# Patient Record
Sex: MALE | Race: WHITE | Employment: OTHER | URBAN - METROPOLITAN AREA
[De-identification: names, ages, dates, MRNs, and addresses within clinical notes are randomized per-mention and may not be internally consistent; named-entity substitution may affect disease eponyms.]

---

## 2017-12-04 ENCOUNTER — OFFICE VISIT (OUTPATIENT)
Dept: FAMILY MEDICINE CLINIC | Age: 62
End: 2017-12-04
Payer: COMMERCIAL

## 2017-12-04 VITALS
HEIGHT: 71 IN | TEMPERATURE: 97 F | WEIGHT: 264 LBS | RESPIRATION RATE: 18 BRPM | SYSTOLIC BLOOD PRESSURE: 134 MMHG | DIASTOLIC BLOOD PRESSURE: 72 MMHG | HEART RATE: 82 BPM | BODY MASS INDEX: 36.96 KG/M2 | OXYGEN SATURATION: 98 %

## 2017-12-04 DIAGNOSIS — Z12.11 SCREENING FOR COLON CANCER: ICD-10-CM

## 2017-12-04 DIAGNOSIS — Z00.00 WELL ADULT EXAM: ICD-10-CM

## 2017-12-04 DIAGNOSIS — Z23 IMMUNIZATION DUE: ICD-10-CM

## 2017-12-04 DIAGNOSIS — Z12.5 SCREENING FOR PROSTATE CANCER: ICD-10-CM

## 2017-12-04 DIAGNOSIS — K42.1 UMBILICAL HERNIA WITH GANGRENE AND OBSTRUCTION: ICD-10-CM

## 2017-12-04 DIAGNOSIS — I87.2 VENOUS REFLUX: ICD-10-CM

## 2017-12-04 DIAGNOSIS — K43.9 VENTRAL HERNIA WITHOUT OBSTRUCTION OR GANGRENE: ICD-10-CM

## 2017-12-04 DIAGNOSIS — E66.9 OBESITY, UNSPECIFIED CLASSIFICATION, UNSPECIFIED OBESITY TYPE, UNSPECIFIED WHETHER SERIOUS COMORBIDITY PRESENT: Primary | ICD-10-CM

## 2017-12-04 PROCEDURE — 90715 TDAP VACCINE 7 YRS/> IM: CPT | Performed by: INTERNAL MEDICINE

## 2017-12-04 PROCEDURE — 90472 IMMUNIZATION ADMIN EACH ADD: CPT | Performed by: INTERNAL MEDICINE

## 2017-12-04 PROCEDURE — 90630 INFLUENZA, QUADV, 18-64 YRS, ID, PF, MICRO INJ, 0.1ML (FLUZONE QUADV, PF): CPT | Performed by: INTERNAL MEDICINE

## 2017-12-04 PROCEDURE — 90471 IMMUNIZATION ADMIN: CPT | Performed by: INTERNAL MEDICINE

## 2017-12-04 PROCEDURE — 99202 OFFICE O/P NEW SF 15 MIN: CPT | Performed by: INTERNAL MEDICINE

## 2017-12-04 ASSESSMENT — ENCOUNTER SYMPTOMS
NAUSEA: 0
DIARRHEA: 0
CONSTIPATION: 0
VOMITING: 0
COLOR CHANGE: 0
ABDOMINAL PAIN: 0
BLOOD IN STOOL: 0
ABDOMINAL DISTENTION: 0
ANAL BLEEDING: 0
RECTAL PAIN: 0

## 2017-12-04 ASSESSMENT — PATIENT HEALTH QUESTIONNAIRE - PHQ9
SUM OF ALL RESPONSES TO PHQ9 QUESTIONS 1 & 2: 0
2. FEELING DOWN, DEPRESSED OR HOPELESS: 0
SUM OF ALL RESPONSES TO PHQ QUESTIONS 1-9: 0
1. LITTLE INTEREST OR PLEASURE IN DOING THINGS: 0

## 2017-12-04 NOTE — PROGRESS NOTES
[address] [facility] [place] [address]  Dept: [phone]  Dept Fax: [phone]    Mounika Garcia is a 57 y.o. male who presents today for his medical conditions/complaints as noted below. Mounika Garcia is c/o of   Chief Complaint   Patient presents with    New Patient    Established New Doctor    Hernia     Right above belly button happened early summer       HPI:     Patient here to establish with provider. Has not been seen in many years. She does not have any chronic medical conditions. Family history of prostate cancer in his father who  about 27 years ago. He states mother  4 years ago from pneumonia complications does not talk to most of his siblings. Is a former smoker but quit approximately 30 years ago. Does not drink except very occasionally. Any recent lab work immunizations or other preventative testing. Also states that he has had a lot of loss of many teeth any sick get into see a dentist.  He denies any real specific symptoms except the main reason he is here is that he noticed a small hernia around his bellybutton this summer and his wife keep snacking him to get it taken care of. Hasn't really changed in size is just somewhat annoying but doesn't really cause any pain no nausea vomiting or constipation no diarrhea and no other GI symptoms. No history of recurrent abdominal surgeries. Has not gotten any bigger or smaller. Worsens with strain. No results found for: LABA1C          ( goal A1C is < 7)   No results found for: LABMICR  No results found for: LDLCHOLESTEROL, LDLCALC    (goal LDL is <100)   No results found for: AST, ALT, BUN  BP Readings from Last 3 Encounters:   17 134/72          (goal 120/80)    History reviewed. No pertinent past medical history. History reviewed. No pertinent surgical history.     Family History   Problem Relation Age of Onset    No Known Problems Mother     Cancer Father     Alcohol Abuse Father     No Known Problems Sister     No Known Problems Sister     No Known Problems Sister        Social History   Substance Use Topics    Smoking status: Never Smoker    Smokeless tobacco: Never Used    Alcohol use No      No current outpatient prescriptions on file. No current facility-administered medications for this visit. No Known Allergies    Health Maintenance   Topic Date Due    DTaP/Tdap/Td vaccine (1 - Tdap) 12/14/1973    Lipid screen  12/14/1994    Diabetes screen  12/14/1994    Colon cancer screen colonoscopy  12/14/2004    Zostavax vaccine  12/14/2014    Flu vaccine (1) 09/01/2017    Hepatitis C screen  12/31/2020 (Originally 1955)    HIV screen  12/30/2021 (Originally 12/14/1969)       Subjective:      Review of Systems   Constitutional: Negative for activity change, appetite change, chills, diaphoresis, fatigue, fever and unexpected weight change. Eyes: Negative for visual disturbance. Cardiovascular: Negative for chest pain, palpitations and leg swelling. Gastrointestinal: Negative for abdominal distention, abdominal pain, anal bleeding, blood in stool, constipation, diarrhea, nausea, rectal pain and vomiting. Musculoskeletal: Negative for arthralgias. Skin: Negative for color change, pallor, rash and wound. Neurological: Negative for headaches. Psychiatric/Behavioral: Negative for sleep disturbance. Objective:     Physical Exam   Constitutional: He is oriented to person, place, and time. He appears well-developed and well-nourished. He is active. Non-toxic appearance. He does not have a sickly appearance. He does not appear ill. No distress. Obese    HENT:   Right Ear: External ear normal.   Left Ear: External ear normal.   Nose: Nose normal.   Mouth/Throat: Uvula is midline, oropharynx is clear and moist and mucous membranes are normal. He does not have dentures. Abnormal dentition.  Dental exam     5. Screening for prostate cancer  Psa screening   6. Ventral hernia without obstruction or gangrene     7. Venous reflux     8. Immunization due  Adra Conrad, 18-64 YRS, ID, PF, MICRO INJ, 0.1ML (FLUZONE QUADV, PF)    Tdap (age 10y-63y) IM (Adacel)             Plan:      Return in about 1 year (around 12/4/2018) for annual exam.    Orders Placed This Encounter   Procedures    INFLUENZA, Marzena Sinclair, 18-64 YRS, ID, PF, MICRO INJ, 0.1ML (FLUZONE QUADV, PF)    Tdap (age 10y-63y) IM (Adacel)    CBC Auto Differential     Standing Status:   Future     Standing Expiration Date:   12/4/2018    Comprehensive Metabolic Panel     Standing Status:   Future     Standing Expiration Date:   12/4/2018    Psa screening     Standing Status:   Future     Standing Expiration Date:   12/4/2018    Lipid Panel     Standing Status:   Future     Standing Expiration Date:   12/4/2018     Order Specific Question:   Is Patient Fasting?/# of Hours     Answer:   yes    Hemoglobin A1C     Standing Status:   Future     Standing Expiration Date:   12/4/2018   [facility] General Surgery     Referral Priority:   Routine     Referral Type:   Consult for Advice and Opinion     Referral Reason:   Specialty Services Required     Requested Specialty:   General Surgery     Number of Visits Requested:   1     No orders of the defined types were placed in this encounter. Given tdap booster and flu shot today as due. Reviewed these vaccines with patient. He also wants to do colonoscopy versus FIT TEST SO PUT IN REFERRAL to general surgery in the hopes they can do both the cscope for screening and surgery for hernia( but clearly not at the same time) . Also given lab slip for routine screening labs and we will call with results as soon as we get them an dif anything abnormal may need to make a sooner follow up. Call with any questions or concerns of if anything acute comes up.       Patient given educational materials - see patient instructions. Discussed use, benefit, and side effects of prescribed medications. All patient questions answered. Pt voiced understanding. Reviewed health maintenance. Instructed to continue current medications, diet and exercise. Patient agreed with treatment plan. Follow up as directed.      Electronically signed by Yessy Hopkins DO on 12/4/2017 at 11:14 AM

## 2017-12-05 ENCOUNTER — HOSPITAL ENCOUNTER (OUTPATIENT)
Age: 62
Discharge: HOME | End: 2017-12-05
Payer: COMMERCIAL

## 2017-12-05 DIAGNOSIS — Z12.5 SCREENING FOR PROSTATE CANCER: ICD-10-CM

## 2017-12-05 DIAGNOSIS — E66.9 OBESITY, UNSPECIFIED CLASSIFICATION, UNSPECIFIED OBESITY TYPE, UNSPECIFIED WHETHER SERIOUS COMORBIDITY PRESENT: ICD-10-CM

## 2017-12-05 DIAGNOSIS — K42.1 UMBILICAL HERNIA WITH GANGRENE AND OBSTRUCTION: ICD-10-CM

## 2017-12-05 LAB
ABSOLUTE EOS #: 0.1 K/UL (ref 0–0.4)
ABSOLUTE IMMATURE GRANULOCYTE: ABNORMAL K/UL (ref 0–0.3)
ABSOLUTE LYMPH #: 1.4 K/UL (ref 1–4.8)
ABSOLUTE MONO #: 0.5 K/UL (ref 0.2–0.8)
ALBUMIN SERPL-MCNC: 3.9 G/DL (ref 3.5–5.2)
ALBUMIN/GLOBULIN RATIO: ABNORMAL (ref 1–2.5)
ALP BLD-CCNC: 64 U/L (ref 40–129)
ALT SERPL-CCNC: 27 U/L (ref 5–41)
ANION GAP SERPL CALCULATED.3IONS-SCNC: 10 MMOL/L (ref 9–17)
AST SERPL-CCNC: 19 U/L
BASOPHILS # BLD: 1 % (ref 0–2)
BASOPHILS ABSOLUTE: 0 K/UL (ref 0–0.2)
BILIRUB SERPL-MCNC: 0.67 MG/DL (ref 0.3–1.2)
BUN BLDV-MCNC: 16 MG/DL (ref 8–23)
BUN/CREAT BLD: 13 (ref 9–20)
CALCIUM SERPL-MCNC: 8.8 MG/DL (ref 8.6–10.4)
CHLORIDE BLD-SCNC: 102 MMOL/L (ref 98–107)
CHOLESTEROL/HDL RATIO: 5.1
CHOLESTEROL: 189 MG/DL
CO2: 28 MMOL/L (ref 20–31)
CREAT SERPL-MCNC: 1.22 MG/DL (ref 0.7–1.2)
DIFFERENTIAL TYPE: ABNORMAL
EOSINOPHILS RELATIVE PERCENT: 2 % (ref 1–4)
ESTIMATED AVERAGE GLUCOSE: 120 MG/DL
GFR AFRICAN AMERICAN: >60 ML/MIN
GFR NON-AFRICAN AMERICAN: >60 ML/MIN
GFR SERPL CREATININE-BSD FRML MDRD: ABNORMAL ML/MIN/{1.73_M2}
GFR SERPL CREATININE-BSD FRML MDRD: ABNORMAL ML/MIN/{1.73_M2}
GLUCOSE BLD-MCNC: 112 MG/DL (ref 70–99)
HBA1C MFR BLD: 5.8 % (ref 4–6)
HCT VFR BLD CALC: 47.2 % (ref 41–53)
HDLC SERPL-MCNC: 37 MG/DL
HEMOGLOBIN: 15.5 G/DL (ref 13.5–17.5)
IMMATURE GRANULOCYTES: ABNORMAL %
LDL CHOLESTEROL: 123 MG/DL (ref 0–130)
LYMPHOCYTES # BLD: 23 % (ref 24–44)
MCH RBC QN AUTO: 29.1 PG (ref 26–34)
MCHC RBC AUTO-ENTMCNC: 32.8 G/DL (ref 31–37)
MCV RBC AUTO: 88.9 FL (ref 80–100)
MONOCYTES # BLD: 8 % (ref 1–7)
PDW BLD-RTO: 13.6 % (ref 11.5–14.5)
PLATELET # BLD: 245 K/UL (ref 130–400)
PLATELET ESTIMATE: ABNORMAL
PMV BLD AUTO: 8.3 FL (ref 6–12)
POTASSIUM SERPL-SCNC: 4.4 MMOL/L (ref 3.7–5.3)
PROSTATE SPECIFIC ANTIGEN: 3.66 UG/L
RBC # BLD: 5.31 M/UL (ref 4.5–5.9)
RBC # BLD: ABNORMAL 10*6/UL
SEG NEUTROPHILS: 66 % (ref 36–66)
SEGMENTED NEUTROPHILS ABSOLUTE COUNT: 4.1 K/UL (ref 1.8–7.7)
SODIUM BLD-SCNC: 140 MMOL/L (ref 135–144)
TOTAL PROTEIN: 7.8 G/DL (ref 6.4–8.3)
TRIGL SERPL-MCNC: 146 MG/DL
VLDLC SERPL CALC-MCNC: ABNORMAL MG/DL (ref 1–30)
WBC # BLD: 6.2 K/UL (ref 3.5–11)
WBC # BLD: ABNORMAL 10*3/UL

## 2017-12-05 PROCEDURE — G0103 PSA SCREENING: HCPCS

## 2017-12-05 PROCEDURE — 36415 COLL VENOUS BLD VENIPUNCTURE: CPT

## 2017-12-05 PROCEDURE — 83036 HEMOGLOBIN GLYCOSYLATED A1C: CPT

## 2017-12-05 PROCEDURE — 80053 COMPREHEN METABOLIC PANEL: CPT

## 2017-12-05 PROCEDURE — 80061 LIPID PANEL: CPT

## 2017-12-05 PROCEDURE — 85025 COMPLETE CBC W/AUTO DIFF WBC: CPT

## 2017-12-21 ENCOUNTER — OFFICE VISIT (OUTPATIENT)
Dept: SURGERY | Age: 62
End: 2017-12-21
Payer: COMMERCIAL

## 2017-12-21 VITALS
SYSTOLIC BLOOD PRESSURE: 125 MMHG | DIASTOLIC BLOOD PRESSURE: 85 MMHG | HEART RATE: 87 BPM | BODY MASS INDEX: 35.38 KG/M2 | WEIGHT: 261.2 LBS | HEIGHT: 72 IN

## 2017-12-21 DIAGNOSIS — K42.9 UMBILICAL HERNIA WITHOUT OBSTRUCTION OR GANGRENE: Primary | ICD-10-CM

## 2017-12-21 DIAGNOSIS — E66.01 MORBID (SEVERE) OBESITY DUE TO EXCESS CALORIES (HCC): ICD-10-CM

## 2017-12-21 PROCEDURE — 99203 OFFICE O/P NEW LOW 30 MIN: CPT | Performed by: SURGERY

## 2017-12-21 NOTE — PROGRESS NOTES
History & Physical      Chief Complaint   Patient presents with    Hernia     NP umbillical hernia referred by Dr. Naz Patiño Maintenance     pt up to date       HPI  Mr. Leif Hooks is a 60 y.o. y.o. male seen for umbilical hernia with intermittent pain. He denies nausea or vomiting. He is morbid obesity. REVIEW OF SYSTEMS:    CONSTITUTIONAL:  positive for  Morbid obesity  EYES:  negative  HEENT:  negative  RESPIRATORY:  negative  CARDIOVASCULAR:  negative  GASTROINTESTINAL:  positive for abdominal pain and hernia  GENITOURINARY:  negative  INTEGUMENT/BREAST:  negative  HEMATOLOGIC/LYMPHATIC:  negative  ALLERGIC/IMMUNOLOGIC:  negative  ENDOCRINE:  negative  MUSCULOSKELETAL:  negative  NEUROLOGICAL:  negative  BEHAVIOR/PSYCH:  negative      History reviewed. No pertinent past medical history. History reviewed. No pertinent surgical history. Family History   Problem Relation Age of Onset    No Known Problems Mother     Cancer Father     Alcohol Abuse Father     No Known Problems Sister     No Known Problems Sister     No Known Problems Sister      Social History     Social History    Marital status:      Spouse name: N/A    Number of children: N/A    Years of education: N/A     Occupational History    Not on file. Social History Main Topics    Smoking status: Never Smoker    Smokeless tobacco: Never Used    Alcohol use No    Drug use: No    Sexual activity: Yes     Partners: Female     Other Topics Concern    Not on file     Social History Narrative    No narrative on file     No current outpatient prescriptions on file prior to visit. No current facility-administered medications on file prior to visit. Physical Exam:    Vitals: /85 (Site: Right Arm, Position: Sitting, Cuff Size: Medium Adult)   Pulse 87   Ht 6' (1.829 m)   Wt 261 lb 3.2 oz (118.5 kg)   BMI 35.43 kg/m²   Last 3 weights:   Wt Readings from Last 3 Encounters:   12/21/17 found for: MG  Phosphorus: No results found for: PHOS  ABG: No results found for: PHART  Amylase: No results found for: AMYLASE  Lipase: No results found for: LIPASE      Dory Villalobos was seen today for hernia and health maintenance. Diagnoses and all orders for this visit:    Umbilical hernia without obstruction or gangrene    Morbid (severe) obesity due to excess calories ([address]        Plan  · Open umbilical hernia repair under MAC. · Patient was informed the risk of the surgery include, but not limited to infection, bleeding, recurrence. Patient expressed understanding, agree to proceed.      Tomy Monson M.D.

## 2017-12-27 ENCOUNTER — TELEPHONE (OUTPATIENT)
Dept: SURGERY | Age: 62
End: 2017-12-27

## 2017-12-27 NOTE — TELEPHONE ENCOUNTER
Patient is scheduled for surgery on 1/4/18 at 7:30am.  Patient confirmed and verbalized understanding.

## 2018-01-03 ENCOUNTER — ANESTHESIA EVENT (OUTPATIENT)
Dept: OPERATING ROOM | Age: 63
End: 2018-01-03
Payer: COMMERCIAL

## 2018-01-04 ENCOUNTER — ANESTHESIA (OUTPATIENT)
Dept: OPERATING ROOM | Age: 63
End: 2018-01-04
Payer: COMMERCIAL

## 2018-01-04 ENCOUNTER — HOSPITAL ENCOUNTER (OUTPATIENT)
Age: 63
Setting detail: OUTPATIENT SURGERY
Discharge: HOME | End: 2018-01-04
Attending: SURGERY | Admitting: SURGERY
Payer: COMMERCIAL

## 2018-01-04 VITALS — DIASTOLIC BLOOD PRESSURE: 54 MMHG | SYSTOLIC BLOOD PRESSURE: 93 MMHG | OXYGEN SATURATION: 95 % | TEMPERATURE: 96.9 F

## 2018-01-04 VITALS
RESPIRATION RATE: 16 BRPM | OXYGEN SATURATION: 97 % | TEMPERATURE: 97.5 F | SYSTOLIC BLOOD PRESSURE: 126 MMHG | HEIGHT: 71 IN | HEART RATE: 76 BPM | WEIGHT: 260.8 LBS | DIASTOLIC BLOOD PRESSURE: 76 MMHG | BODY MASS INDEX: 36.51 KG/M2

## 2018-01-04 DIAGNOSIS — K42.9 UMBILICAL HERNIA WITHOUT OBSTRUCTION OR GANGRENE: Primary | ICD-10-CM

## 2018-01-04 LAB
EKG ATRIAL RATE: 71 BPM
EKG P AXIS: 58 DEGREES
EKG P-R INTERVAL: 172 MS
EKG Q-T INTERVAL: 396 MS
EKG QRS DURATION: 82 MS
EKG QTC CALCULATION (BAZETT): 430 MS
EKG R AXIS: 26 DEGREES
EKG T AXIS: 47 DEGREES
EKG VENTRICULAR RATE: 71 BPM

## 2018-01-04 PROCEDURE — 2500000003 HC RX 250 WO HCPCS: Performed by: NURSE ANESTHETIST, CERTIFIED REGISTERED

## 2018-01-04 PROCEDURE — 2580000003 HC RX 258: Performed by: ANESTHESIOLOGY

## 2018-01-04 PROCEDURE — 7100000001 HC PACU RECOVERY - ADDTL 15 MIN: Performed by: SURGERY

## 2018-01-04 PROCEDURE — 6370000000 HC RX 637 (ALT 250 FOR IP): Performed by: SURGERY

## 2018-01-04 PROCEDURE — 3700000000 HC ANESTHESIA ATTENDED CARE: Performed by: SURGERY

## 2018-01-04 PROCEDURE — 3600000003 HC SURGERY LEVEL 3 BASE: Performed by: SURGERY

## 2018-01-04 PROCEDURE — 2500000003 HC RX 250 WO HCPCS: Performed by: SURGERY

## 2018-01-04 PROCEDURE — 7100000010 HC PHASE II RECOVERY - FIRST 15 MIN: Performed by: SURGERY

## 2018-01-04 PROCEDURE — 6360000002 HC RX W HCPCS: Performed by: NURSE ANESTHETIST, CERTIFIED REGISTERED

## 2018-01-04 PROCEDURE — 7100000000 HC PACU RECOVERY - FIRST 15 MIN: Performed by: SURGERY

## 2018-01-04 PROCEDURE — 3600000013 HC SURGERY LEVEL 3 ADDTL 15MIN: Performed by: SURGERY

## 2018-01-04 PROCEDURE — 3700000001 HC ADD 15 MINUTES (ANESTHESIA): Performed by: SURGERY

## 2018-01-04 PROCEDURE — A6258 TRANSPARENT FILM >16<=48 IN: HCPCS | Performed by: SURGERY

## 2018-01-04 PROCEDURE — 2580000003 HC RX 258: Performed by: SURGERY

## 2018-01-04 PROCEDURE — 6360000002 HC RX W HCPCS

## 2018-01-04 PROCEDURE — 93005 ELECTROCARDIOGRAM TRACING: CPT

## 2018-01-04 PROCEDURE — L0450 TLSO FLEX TRUNK/THOR PRE OTS: HCPCS | Performed by: SURGERY

## 2018-01-04 RX ORDER — SODIUM CHLORIDE 0.9 % (FLUSH) 0.9 %
10 SYRINGE (ML) INJECTION EVERY 12 HOURS SCHEDULED
Status: DISCONTINUED | OUTPATIENT
Start: 2018-01-04 | End: 2018-01-04 | Stop reason: HOSPADM

## 2018-01-04 RX ORDER — SODIUM CHLORIDE, SODIUM LACTATE, POTASSIUM CHLORIDE, CALCIUM CHLORIDE 600; 310; 30; 20 MG/100ML; MG/100ML; MG/100ML; MG/100ML
INJECTION, SOLUTION INTRAVENOUS CONTINUOUS
Status: DISCONTINUED | OUTPATIENT
Start: 2018-01-04 | End: 2018-01-04 | Stop reason: HOSPADM

## 2018-01-04 RX ORDER — SODIUM CHLORIDE 0.9 % (FLUSH) 0.9 %
10 SYRINGE (ML) INJECTION PRN
Status: DISCONTINUED | OUTPATIENT
Start: 2018-01-04 | End: 2018-01-04 | Stop reason: HOSPADM

## 2018-01-04 RX ORDER — MIDAZOLAM HYDROCHLORIDE 1 MG/ML
2 INJECTION INTRAMUSCULAR; INTRAVENOUS
Status: DISCONTINUED | OUTPATIENT
Start: 2018-01-04 | End: 2018-01-04 | Stop reason: HOSPADM

## 2018-01-04 RX ORDER — PROPOFOL 10 MG/ML
INJECTION, EMULSION INTRAVENOUS PRN
Status: DISCONTINUED | OUTPATIENT
Start: 2018-01-04 | End: 2018-01-04 | Stop reason: SDUPTHER

## 2018-01-04 RX ORDER — BUPIVACAINE HYDROCHLORIDE 5 MG/ML
INJECTION, SOLUTION EPIDURAL; INTRACAUDAL PRN
Status: DISCONTINUED | OUTPATIENT
Start: 2018-01-04 | End: 2018-01-04 | Stop reason: HOSPADM

## 2018-01-04 RX ORDER — GINSENG 100 MG
CAPSULE ORAL PRN
Status: DISCONTINUED | OUTPATIENT
Start: 2018-01-04 | End: 2018-01-04 | Stop reason: HOSPADM

## 2018-01-04 RX ORDER — MAGNESIUM HYDROXIDE 1200 MG/15ML
LIQUID ORAL CONTINUOUS PRN
Status: DISCONTINUED | OUTPATIENT
Start: 2018-01-04 | End: 2018-01-04 | Stop reason: HOSPADM

## 2018-01-04 RX ORDER — ONDANSETRON 2 MG/ML
4 INJECTION INTRAMUSCULAR; INTRAVENOUS
Status: DISCONTINUED | OUTPATIENT
Start: 2018-01-04 | End: 2018-01-04 | Stop reason: HOSPADM

## 2018-01-04 RX ORDER — DEXAMETHASONE SODIUM PHOSPHATE 10 MG/ML
INJECTION INTRAMUSCULAR; INTRAVENOUS PRN
Status: DISCONTINUED | OUTPATIENT
Start: 2018-01-04 | End: 2018-01-04 | Stop reason: SDUPTHER

## 2018-01-04 RX ORDER — ONDANSETRON 2 MG/ML
INJECTION INTRAMUSCULAR; INTRAVENOUS PRN
Status: DISCONTINUED | OUTPATIENT
Start: 2018-01-04 | End: 2018-01-04 | Stop reason: SDUPTHER

## 2018-01-04 RX ORDER — LIDOCAINE HYDROCHLORIDE 10 MG/ML
1 INJECTION, SOLUTION EPIDURAL; INFILTRATION; INTRACAUDAL; PERINEURAL
Status: DISCONTINUED | OUTPATIENT
Start: 2018-01-04 | End: 2018-01-04 | Stop reason: HOSPADM

## 2018-01-04 RX ORDER — FENTANYL CITRATE 50 UG/ML
50 INJECTION, SOLUTION INTRAMUSCULAR; INTRAVENOUS EVERY 5 MIN PRN
Status: DISCONTINUED | OUTPATIENT
Start: 2018-01-04 | End: 2018-01-04 | Stop reason: HOSPADM

## 2018-01-04 RX ORDER — OXYCODONE HYDROCHLORIDE AND ACETAMINOPHEN 5; 325 MG/1; MG/1
1 TABLET ORAL EVERY 6 HOURS PRN
Qty: 28 TABLET | Refills: 0 | Status: SHIPPED | OUTPATIENT
Start: 2018-01-04 | End: 2018-01-11

## 2018-01-04 RX ORDER — LIDOCAINE HYDROCHLORIDE 10 MG/ML
INJECTION, SOLUTION EPIDURAL; INFILTRATION; INTRACAUDAL; PERINEURAL PRN
Status: DISCONTINUED | OUTPATIENT
Start: 2018-01-04 | End: 2018-01-04 | Stop reason: SDUPTHER

## 2018-01-04 RX ORDER — ROCURONIUM BROMIDE 10 MG/ML
INJECTION, SOLUTION INTRAVENOUS PRN
Status: DISCONTINUED | OUTPATIENT
Start: 2018-01-04 | End: 2018-01-04 | Stop reason: SDUPTHER

## 2018-01-04 RX ORDER — FENTANYL CITRATE 50 UG/ML
INJECTION, SOLUTION INTRAMUSCULAR; INTRAVENOUS PRN
Status: DISCONTINUED | OUTPATIENT
Start: 2018-01-04 | End: 2018-01-04 | Stop reason: SDUPTHER

## 2018-01-04 RX ORDER — LABETALOL HYDROCHLORIDE 5 MG/ML
5 INJECTION, SOLUTION INTRAVENOUS EVERY 10 MIN PRN
Status: DISCONTINUED | OUTPATIENT
Start: 2018-01-04 | End: 2018-01-04 | Stop reason: HOSPADM

## 2018-01-04 RX ORDER — GLYCOPYRROLATE 0.2 MG/ML
INJECTION INTRAMUSCULAR; INTRAVENOUS PRN
Status: DISCONTINUED | OUTPATIENT
Start: 2018-01-04 | End: 2018-01-04 | Stop reason: SDUPTHER

## 2018-01-04 RX ADMIN — GLYCOPYRROLATE 0.4 MG: 0.2 INJECTION INTRAMUSCULAR; INTRAVENOUS at 07:59

## 2018-01-04 RX ADMIN — PROPOFOL 200 MG: 10 INJECTION, EMULSION INTRAVENOUS at 07:19

## 2018-01-04 RX ADMIN — SODIUM CHLORIDE, POTASSIUM CHLORIDE, SODIUM LACTATE AND CALCIUM CHLORIDE: 600; 310; 30; 20 INJECTION, SOLUTION INTRAVENOUS at 07:04

## 2018-01-04 RX ADMIN — ONDANSETRON 4 MG: 2 INJECTION INTRAMUSCULAR; INTRAVENOUS at 08:00

## 2018-01-04 RX ADMIN — DEXAMETHASONE SODIUM PHOSPHATE 10 MG: 10 INJECTION INTRAMUSCULAR; INTRAVENOUS at 07:30

## 2018-01-04 RX ADMIN — ROCURONIUM BROMIDE 30 MG: 10 INJECTION INTRAVENOUS at 07:19

## 2018-01-04 RX ADMIN — NEOSTIGMINE METHYLSULFATE 2 MG: 1 INJECTION, SOLUTION INTRAMUSCULAR; INTRAVENOUS; SUBCUTANEOUS at 07:59

## 2018-01-04 RX ADMIN — LIDOCAINE HYDROCHLORIDE 50 MG: 10 INJECTION, SOLUTION EPIDURAL; INFILTRATION; INTRACAUDAL; PERINEURAL at 07:19

## 2018-01-04 RX ADMIN — FENTANYL CITRATE 50 MCG: 50 INJECTION INTRAMUSCULAR; INTRAVENOUS at 07:19

## 2018-01-04 RX ADMIN — LIDOCAINE HYDROCHLORIDE 50 MG: 10 INJECTION, SOLUTION EPIDURAL; INFILTRATION; INTRACAUDAL; PERINEURAL at 08:20

## 2018-01-04 ASSESSMENT — PULMONARY FUNCTION TESTS
PIF_VALUE: 3
PIF_VALUE: 19
PIF_VALUE: 3
PIF_VALUE: 19
PIF_VALUE: 20
PIF_VALUE: 19
PIF_VALUE: 21
PIF_VALUE: 3
PIF_VALUE: 3
PIF_VALUE: 0
PIF_VALUE: 2
PIF_VALUE: 14
PIF_VALUE: 16
PIF_VALUE: 3
PIF_VALUE: 18
PIF_VALUE: 1
PIF_VALUE: 20
PIF_VALUE: 3
PIF_VALUE: 3
PIF_VALUE: 19
PIF_VALUE: 14
PIF_VALUE: 18
PIF_VALUE: 3
PIF_VALUE: 21
PIF_VALUE: 19
PIF_VALUE: 0
PIF_VALUE: 20
PIF_VALUE: 0
PIF_VALUE: 3
PIF_VALUE: 0
PIF_VALUE: 11
PIF_VALUE: 20
PIF_VALUE: 2
PIF_VALUE: 3
PIF_VALUE: 18
PIF_VALUE: 3
PIF_VALUE: 2
PIF_VALUE: 17
PIF_VALUE: 21
PIF_VALUE: 3
PIF_VALUE: 20
PIF_VALUE: 19
PIF_VALUE: 20
PIF_VALUE: 0
PIF_VALUE: 1
PIF_VALUE: 3
PIF_VALUE: 18
PIF_VALUE: 1
PIF_VALUE: 19
PIF_VALUE: 4
PIF_VALUE: 3
PIF_VALUE: 0
PIF_VALUE: 18
PIF_VALUE: 20
PIF_VALUE: 20
PIF_VALUE: 19
PIF_VALUE: 16
PIF_VALUE: 13
PIF_VALUE: 3
PIF_VALUE: 21
PIF_VALUE: 18
PIF_VALUE: 3
PIF_VALUE: 20
PIF_VALUE: 3
PIF_VALUE: 3
PIF_VALUE: 19
PIF_VALUE: 3
PIF_VALUE: 19
PIF_VALUE: 19
PIF_VALUE: 3
PIF_VALUE: 2

## 2018-01-04 ASSESSMENT — PAIN DESCRIPTION - PAIN TYPE
TYPE: SURGICAL PAIN
TYPE: SURGICAL PAIN

## 2018-01-04 ASSESSMENT — PAIN SCALES - GENERAL
PAINLEVEL_OUTOF10: 2

## 2018-01-04 ASSESSMENT — PAIN DESCRIPTION - LOCATION
LOCATION: ABDOMEN
LOCATION: ABDOMEN

## 2018-01-04 ASSESSMENT — PAIN - FUNCTIONAL ASSESSMENT: PAIN_FUNCTIONAL_ASSESSMENT: 0-10

## 2018-01-04 NOTE — H&P
H&P from 12/21/2017 was reviewed. There is no change. Electronically signed by Vaishali Campbell MD on 1/4/2018 at 7:14 AM               Chief Complaint   Patient presents with    Hernia       NP umbillical hernia referred by Dr. Rhonda Sawyer pt up to date         HPI  Mr. Helen Obrien is a 60 y.o. y.o. male seen for umbilical hernia with intermittent pain. He denies nausea or vomiting. He is morbid obesity.      REVIEW OF SYSTEMS:     CONSTITUTIONAL:  positive for  Morbid obesity  EYES:  negative  HEENT:  negative  RESPIRATORY:  negative  CARDIOVASCULAR:  negative  GASTROINTESTINAL:  positive for abdominal pain and hernia  GENITOURINARY:  negative  INTEGUMENT/BREAST:  negative  HEMATOLOGIC/LYMPHATIC:  negative  ALLERGIC/IMMUNOLOGIC:  negative  ENDOCRINE:  negative  MUSCULOSKELETAL:  negative  NEUROLOGICAL:  negative  BEHAVIOR/PSYCH:  negative        Past Medical History   History reviewed. No pertinent past medical history. Past Surgical History   History reviewed. No pertinent surgical history.      Family History         Family History   Problem Relation Age of Onset    No Known Problems Mother      Cancer Father      Alcohol Abuse Father      No Known Problems Sister      No Known Problems Sister      No Known Problems Sister           Social History   Social History            Social History    Marital status:        Spouse name: N/A    Number of children: N/A    Years of education: N/A          Occupational History    Not on file.            Social History Main Topics    Smoking status: Never Smoker    Smokeless tobacco: Never Used    Alcohol use No    Drug use: No    Sexual activity: Yes       Partners: Female           Other Topics Concern    Not on file          Social History Narrative    No narrative on file         No current outpatient prescriptions on file prior to visit.      No current facility-administered medications on file prior to

## 2018-01-04 NOTE — BRIEF OP NOTE
Brief Postoperative Note  ______________________________________________________________    Patient: Scott Failing  YOB: 1955  MRN: [id number]  Date of Procedure: 1/4/2018    Pre-Op Diagnosis: UMBILICAL HERNIA WITHOUT OBSTRUCTION     Post-Op Diagnosis: Same       Procedure(s):  OPEN UMBILICAL HERNIA REPAIR, PRIMARY REPAIR    Anesthesia: General    Surgeon(s):  Yaw Gaston MD     Assistant:   Frank Nevarez DO, PGY-2  Alicia Jensen, MS3    Staff:  Patricia Scrub: Davis Pabon  Scrub Person First: Larance Loss     Estimated Blood Loss: 5cc    Complications: None    Findings: Fat containing umbilical hernia sac, without bowel. Hernia defect 1cm. Reduced and closed fascia primarily.     Frank Nevarez DO  Date: 1/4/2018  Time: 8:36 AM

## 2018-01-05 NOTE — OP NOTE
[address] [facility] [address]                                 OPERATIVE REPORT    PATIENT NAME: Blanca Weber                  :        1955  MED REC NO:   [id number]                             ROOM:  ACCOUNT NO:   [de-identified]                           ADMIT DATE: 2018  PROVIDER:     Maria Del Carmen Zeng    DATE OF PROCEDURE:  2018    PREOPERATIVE DIAGNOSIS:  Umbilical hernia without obstruction. POSTOPERATIVE DIAGNOSIS:  Umbilical hernia without obstruction. PROCEDURE:  Open umbilical hernia repair with primary fascial closure. SURGEON:  Lito Faulkner MD    ASSISTANT:  Maria Del Carmen Zeng DO, PGY-2    ANESTHESIA:  General.    ESTIMATED BLOOD LOSS:  5 mL. FINDINGS:  There was an umbilical hernia without obstruction; there was a  fat-containing hernia sac without bowel; reduced successfully and closed  primarily. SPECIMENS:  None. DISPOSITION:  To the recovery room in stable condition. INDICATIONS:  This is a very pleasant 58-year-old male who presented with  periumbilical discomfort with an associated bulge. The risks and benefits  of umbilical hernia repair were discussed with the patient including  bleeding, infection, bowel or bladder injuries as well as intra-abdominal  adhesions, recurrence, chronic pain, wound healing problems. After all the  risks, alternatives, and benefits were discussed with the patient, informed  consent was obtained with an RN witness. OPERATIVE PROCEDURE:  After verbal and written consent, the patient was  brought to the operating room, placed in the supine position. All the  appropriate monitoring, general endotracheal anesthesia was administered at  this time.   Antibiotics were provided in accordance with SCIP protocol in  the form of 2 gm of Ancef, and time-out was performed and the staff in the  room confirmed the patient and the procedure to be performed. Procedure  was then begun with sterile prep and drape to the abdomen. At this time,  local anesthesia was infiltrated to the skin and subcutaneous tissue as a  field block. A #15 blade was used to make a curvilinear infraumbilical  incision, and dissection was then carried down to the subcutaneous tissue  using Bovie electrocautery and Metzenbaums. After a short way through  subcutaneous tissue, the hernia sac was identified and dissected free from  its surrounding structures and fascia. The hernia appeared to be just a  fat-containing hernia without bowel. There was no sign of any sort of  obstruction. After the fascial edges were identified and freed up and  dissected, the hernia was then reduced. Fascia defect is 1.5 cm. At the time of reduction of the  hernia, a #2-0 Prolene suture was then used to close the fascia primarily  in an interrupted fashion. After 3 interrupted Prolene sutures were used  to close the fascia, at this point the umbilicus was anchored using a 4-0  Vicryl suture. At this time, the deep dermal layers were closed in  interrupted inverted dermal suture fashion using 4-0 vicryl, approximating the dermis and  epidermis. Bacitracin was then applied to the incision, and a cotton ball  was placed over it to negative pressure. Tegaderm surgical dressing was  placed and to be kept in place until he has seen Dr. Sue Sheehan in the office in  7 days. All wound care instructions were provided to the patient. All  sponge and instrument counts were correct at the end of the procedure. The  patient was then taken to the PACU in stable condition. Heladio Rasmussen    D: 01/04/2018 9:37:16       T: 01/04/2018 13:28:31     [email or web address]  Job#: [id number]     Doc#: [id number]    cc: Preet Smiley DO      Attending Physician Statement  I have discussed the case, including pertinent history and exam findings with Dr. Shikha Bell, surgical resident and have personally seen the patient.  I agree

## 2018-01-11 ENCOUNTER — OFFICE VISIT (OUTPATIENT)
Dept: SURGERY | Age: 63
End: 2018-01-11
Payer: COMMERCIAL

## 2018-01-11 VITALS
WEIGHT: 262 LBS | SYSTOLIC BLOOD PRESSURE: 137 MMHG | DIASTOLIC BLOOD PRESSURE: 88 MMHG | HEART RATE: 104 BPM | HEIGHT: 72 IN | BODY MASS INDEX: 35.49 KG/M2 | TEMPERATURE: 98.4 F

## 2018-01-11 DIAGNOSIS — Z09 POSTOP CHECK: Primary | ICD-10-CM

## 2018-01-11 PROCEDURE — 99024 POSTOP FOLLOW-UP VISIT: CPT | Performed by: SURGERY

## 2018-03-14 DIAGNOSIS — Z12.11 SCREEN FOR COLON CANCER: Primary | ICD-10-CM

## 2018-03-15 NOTE — ANESTHESIA PRE PROCEDURE
ROS                    Abdominal:   (+) obese,         Vascular: negative vascular ROS. Anesthesia Plan      general     ASA 2     (ASA 2  Patient is agreeable for TAP blocks if recommended.)  Induction: intravenous. Anesthetic plan and risks discussed with patient.                       Deb Mora MD   1/4/2018
Name band;

## 2018-04-11 ENCOUNTER — HOSPITAL ENCOUNTER (OUTPATIENT)
Age: 63
Setting detail: OUTPATIENT SURGERY
Discharge: HOME | End: 2018-04-11
Attending: SURGERY | Admitting: SURGERY
Payer: COMMERCIAL

## 2018-04-11 ENCOUNTER — ANESTHESIA (OUTPATIENT)
Dept: ENDOSCOPY | Age: 63
End: 2018-04-11
Payer: COMMERCIAL

## 2018-04-11 ENCOUNTER — ANESTHESIA EVENT (OUTPATIENT)
Dept: ENDOSCOPY | Age: 63
End: 2018-04-11
Payer: COMMERCIAL

## 2018-04-11 VITALS
OXYGEN SATURATION: 98 % | DIASTOLIC BLOOD PRESSURE: 69 MMHG | WEIGHT: 257 LBS | SYSTOLIC BLOOD PRESSURE: 120 MMHG | RESPIRATION RATE: 16 BRPM | HEART RATE: 59 BPM | TEMPERATURE: 97.7 F | HEIGHT: 72 IN | BODY MASS INDEX: 34.81 KG/M2

## 2018-04-11 VITALS
OXYGEN SATURATION: 93 % | DIASTOLIC BLOOD PRESSURE: 64 MMHG | RESPIRATION RATE: 12 BRPM | SYSTOLIC BLOOD PRESSURE: 115 MMHG

## 2018-04-11 PROBLEM — R10.13 EPIGASTRIC PAIN: Status: ACTIVE | Noted: 2018-04-11

## 2018-04-11 PROBLEM — Z12.11 SCREENING FOR MALIGNANT NEOPLASM OF COLON: Status: ACTIVE | Noted: 2018-04-11

## 2018-04-11 PROCEDURE — 6360000002 HC RX W HCPCS: Performed by: SPECIALIST

## 2018-04-11 PROCEDURE — 7100000011 HC PHASE II RECOVERY - ADDTL 15 MIN: Performed by: SURGERY

## 2018-04-11 PROCEDURE — 88305 TISSUE EXAM BY PATHOLOGIST: CPT

## 2018-04-11 PROCEDURE — 7100000010 HC PHASE II RECOVERY - FIRST 15 MIN: Performed by: SURGERY

## 2018-04-11 PROCEDURE — 3700000001 HC ADD 15 MINUTES (ANESTHESIA): Performed by: SURGERY

## 2018-04-11 PROCEDURE — 3609010300 HC COLONOSCOPY W/BIOPSY SINGLE/MULTIPLE: Performed by: SURGERY

## 2018-04-11 PROCEDURE — 2580000003 HC RX 258: Performed by: SURGERY

## 2018-04-11 PROCEDURE — 3700000000 HC ANESTHESIA ATTENDED CARE: Performed by: SURGERY

## 2018-04-11 PROCEDURE — 2580000003 HC RX 258: Performed by: SPECIALIST

## 2018-04-11 RX ORDER — SODIUM CHLORIDE 9 MG/ML
INJECTION, SOLUTION INTRAVENOUS CONTINUOUS PRN
Status: DISCONTINUED | OUTPATIENT
Start: 2018-04-11 | End: 2018-04-11 | Stop reason: SDUPTHER

## 2018-04-11 RX ORDER — FENTANYL CITRATE 50 UG/ML
INJECTION, SOLUTION INTRAMUSCULAR; INTRAVENOUS PRN
Status: DISCONTINUED | OUTPATIENT
Start: 2018-04-11 | End: 2018-04-11 | Stop reason: SDUPTHER

## 2018-04-11 RX ORDER — ONDANSETRON 2 MG/ML
INJECTION INTRAMUSCULAR; INTRAVENOUS PRN
Status: DISCONTINUED | OUTPATIENT
Start: 2018-04-11 | End: 2018-04-11 | Stop reason: SDUPTHER

## 2018-04-11 RX ORDER — PROPOFOL 10 MG/ML
INJECTION, EMULSION INTRAVENOUS CONTINUOUS PRN
Status: DISCONTINUED | OUTPATIENT
Start: 2018-04-11 | End: 2018-04-11 | Stop reason: SDUPTHER

## 2018-04-11 RX ORDER — MIDAZOLAM HYDROCHLORIDE 1 MG/ML
INJECTION INTRAMUSCULAR; INTRAVENOUS PRN
Status: DISCONTINUED | OUTPATIENT
Start: 2018-04-11 | End: 2018-04-11 | Stop reason: SDUPTHER

## 2018-04-11 RX ORDER — SODIUM CHLORIDE 9 MG/ML
INJECTION, SOLUTION INTRAVENOUS ONCE
Status: COMPLETED | OUTPATIENT
Start: 2018-04-11 | End: 2018-04-11

## 2018-04-11 RX ORDER — PROPOFOL 10 MG/ML
INJECTION, EMULSION INTRAVENOUS PRN
Status: DISCONTINUED | OUTPATIENT
Start: 2018-04-11 | End: 2018-04-11 | Stop reason: SDUPTHER

## 2018-04-11 RX ADMIN — MIDAZOLAM HYDROCHLORIDE 1 MG: 1 INJECTION, SOLUTION INTRAMUSCULAR; INTRAVENOUS at 10:31

## 2018-04-11 RX ADMIN — SODIUM CHLORIDE: 9 INJECTION, SOLUTION INTRAVENOUS at 08:52

## 2018-04-11 RX ADMIN — SODIUM CHLORIDE: 9 INJECTION, SOLUTION INTRAVENOUS at 10:31

## 2018-04-11 RX ADMIN — PROPOFOL 50 MG: 10 INJECTION, EMULSION INTRAVENOUS at 10:37

## 2018-04-11 RX ADMIN — PROPOFOL 120 MCG/KG/MIN: 10 INJECTION, EMULSION INTRAVENOUS at 10:31

## 2018-04-11 RX ADMIN — ONDANSETRON 4 MG: 2 INJECTION, SOLUTION INTRAMUSCULAR; INTRAVENOUS at 10:56

## 2018-04-11 RX ADMIN — FENTANYL CITRATE 50 MCG: 50 INJECTION INTRAMUSCULAR; INTRAVENOUS at 10:34

## 2018-04-11 RX ADMIN — MIDAZOLAM HYDROCHLORIDE 1 MG: 1 INJECTION, SOLUTION INTRAMUSCULAR; INTRAVENOUS at 10:34

## 2018-04-11 RX ADMIN — PROPOFOL 50 MG: 10 INJECTION, EMULSION INTRAVENOUS at 10:39

## 2018-04-11 RX ADMIN — FENTANYL CITRATE 50 MCG: 50 INJECTION INTRAMUSCULAR; INTRAVENOUS at 10:31

## 2018-04-11 ASSESSMENT — PAIN SCALES - GENERAL
PAINLEVEL_OUTOF10: 0

## 2018-04-11 ASSESSMENT — ENCOUNTER SYMPTOMS
SHORTNESS OF BREATH: 0
STRIDOR: 0

## 2018-04-11 ASSESSMENT — PAIN - FUNCTIONAL ASSESSMENT: PAIN_FUNCTIONAL_ASSESSMENT: 0-10

## 2018-04-12 LAB — SURGICAL PATHOLOGY REPORT: NORMAL

## 2018-04-13 ENCOUNTER — TELEPHONE (OUTPATIENT)
Dept: FAMILY MEDICINE CLINIC | Age: 63
End: 2018-04-13

## 2018-04-16 NOTE — TELEPHONE ENCOUNTER
Okay , thanks I believe he had this done at [facility] or [facility] so it should up date in our system.

## 2018-04-26 ENCOUNTER — OFFICE VISIT (OUTPATIENT)
Dept: SURGERY | Age: 63
End: 2018-04-26
Payer: COMMERCIAL

## 2018-04-26 VITALS
SYSTOLIC BLOOD PRESSURE: 126 MMHG | HEART RATE: 81 BPM | BODY MASS INDEX: 35.24 KG/M2 | WEIGHT: 260.2 LBS | DIASTOLIC BLOOD PRESSURE: 73 MMHG | HEIGHT: 72 IN

## 2018-04-26 DIAGNOSIS — D12.3 ADENOMATOUS POLYP OF TRANSVERSE COLON: Primary | ICD-10-CM

## 2018-04-26 PROCEDURE — 99212 OFFICE O/P EST SF 10 MIN: CPT | Performed by: SURGERY

## 2018-05-11 PROBLEM — Z12.11 SCREENING FOR MALIGNANT NEOPLASM OF COLON: Status: RESOLVED | Noted: 2018-04-11 | Resolved: 2018-05-11

## 2020-07-08 ENCOUNTER — OFFICE VISIT (OUTPATIENT)
Dept: FAMILY MEDICINE CLINIC | Age: 65
End: 2020-07-08
Payer: COMMERCIAL

## 2020-07-08 VITALS
OXYGEN SATURATION: 98 % | HEIGHT: 72 IN | TEMPERATURE: 97.5 F | SYSTOLIC BLOOD PRESSURE: 136 MMHG | DIASTOLIC BLOOD PRESSURE: 86 MMHG | BODY MASS INDEX: 35.35 KG/M2 | WEIGHT: 261 LBS | HEART RATE: 85 BPM

## 2020-07-08 PROCEDURE — 1036F TOBACCO NON-USER: CPT | Performed by: INTERNAL MEDICINE

## 2020-07-08 PROCEDURE — G8427 DOCREV CUR MEDS BY ELIG CLIN: HCPCS | Performed by: INTERNAL MEDICINE

## 2020-07-08 PROCEDURE — G8417 CALC BMI ABV UP PARAM F/U: HCPCS | Performed by: INTERNAL MEDICINE

## 2020-07-08 PROCEDURE — 99213 OFFICE O/P EST LOW 20 MIN: CPT | Performed by: INTERNAL MEDICINE

## 2020-07-08 PROCEDURE — 4040F PNEUMOC VAC/ADMIN/RCVD: CPT | Performed by: INTERNAL MEDICINE

## 2020-07-08 PROCEDURE — 1123F ACP DISCUSS/DSCN MKR DOCD: CPT | Performed by: INTERNAL MEDICINE

## 2020-07-08 PROCEDURE — 3017F COLORECTAL CA SCREEN DOC REV: CPT | Performed by: INTERNAL MEDICINE

## 2020-07-08 RX ORDER — PREDNISONE 20 MG/1
TABLET ORAL
Qty: 18 TABLET | Refills: 0 | Status: SHIPPED | OUTPATIENT
Start: 2020-07-08 | End: 2020-07-18

## 2020-07-08 ASSESSMENT — ENCOUNTER SYMPTOMS
ABDOMINAL PAIN: 0
CONSTIPATION: 0
DIARRHEA: 0
BACK PAIN: 0

## 2020-07-08 ASSESSMENT — PATIENT HEALTH QUESTIONNAIRE - PHQ9
1. LITTLE INTEREST OR PLEASURE IN DOING THINGS: 0
2. FEELING DOWN, DEPRESSED OR HOPELESS: 0
SUM OF ALL RESPONSES TO PHQ QUESTIONS 1-9: 0
SUM OF ALL RESPONSES TO PHQ9 QUESTIONS 1 & 2: 0
SUM OF ALL RESPONSES TO PHQ QUESTIONS 1-9: 0

## 2020-07-08 NOTE — PROGRESS NOTES
[address] [facility]  [address] [address]  Dept: [phone]  Dept Fax: [phone]    Stephani Weir a 71 y.o. male who presents today for his medical conditions/complaints as notedbelow. Jennifer Janelle is c/o of   Chief Complaint   Patient presents with    Foot Pain         HPI:     Foot Pain    The pain is present in the left foot. This is a recurrent problem. The current episode started 1 to 4 weeks ago. There has been no history of extremity trauma. The problem occurs daily. The problem has been gradually worsening. The quality of the pain is described as aching. The pain is at a severity of 6/10. The pain is moderate. Associated symptoms include stiffness. Pertinent negatives include no fever, inability to bear weight, itching, joint locking, joint swelling, limited range of motion, numbness or tingling. The symptoms are aggravated by contact and standing. He has tried rest for the symptoms. The treatment provided moderate relief. Family history does not include gout or rheumatoid arthritis. His past medical history is significant for osteoarthritis. There is no history of diabetes, gout or rheumatoid arthritis.        Hemoglobin A1C (%)   Date Value   12/05/2017 5.8         ( goal A1C is < 7)   No results found for: LABMICR  LDL Cholesterol (mg/dL)   Date Value   12/05/2017 123       (goal LDL is <100)   AST (U/L)   Date Value   12/05/2017 19     ALT (U/L)   Date Value   12/05/2017 27     BUN (mg/dL)   Date Value   12/05/2017 16     BP Readings from Last 3 Encounters:   07/08/20 136/86   04/26/18 126/73   04/11/18 120/69          (goal 120/80)    Past Medical History:   Diagnosis Date    Prediabetes     no RX    Umbilical hernia       Past Surgical History:   Procedure Laterality Date    TX COLONOSCOPY W/BIOPSY SINGLE/MULTIPLE N/A 4/11/2018    COLONOSCOPY WITH BIOPSY performed by Anabelle Crowder MD at [address] [place] [address] UMBILICAL OMOB,7+P/J,GABBX N/A 2/3/9410    OPEN UMBILICAL HERNIA REPAIR performed by Susie Marin MD at [address] [facility]  2018    open       Family History   Problem Relation Age of Onset    No Known Problems Mother     Cancer Father     Alcohol Abuse Father     No Known Problems Sister     No Known Problems Sister     No Known Problems Sister        Social History     Tobacco Use    Smoking status: Former Smoker     Packs/day: 1.00     Years: 10.00     Pack years: 10.00     Last attempt to quit: 1990     Years since quittin.5    Smokeless tobacco: Never Used   Substance Use Topics    Alcohol use: No      Current Outpatient Medications   Medication Sig Dispense Refill    predniSONE (DELTASONE) 20 MG tablet 3 tabs x 3 days, then 2 tabs x 3 days, then 1 tab x 3 days 18 tablet 0     No current facility-administered medications for this visit. No Known Allergies       Health Maintenance   Topic Date Due    AAA screen  1955    Shingles Vaccine (1 of 2) 2004    Pneumococcal 65+ years Vaccine (1 of 1 - PPSV23) 2019    Hepatitis C screen  2020 (Originally 1955)    A1C test (Diabetic or Prediabetic)  2021 (Originally 2018)    HIV screen  2021 (Originally 1969)    Flu vaccine (1) 2020    Colon cancer screen colonoscopy  2021    Lipid screen  2022    DTaP/Tdap/Td vaccine (2 - Td) 2027    Hepatitis A vaccine  Aged Out    Hepatitis B vaccine  Aged Out    Hib vaccine  Aged Out    Meningococcal (ACWY) vaccine  Aged Out       Subjective:     Review of Systems   Constitutional: Positive for activity change. Negative for fatigue, fever and unexpected weight change. Eyes: Negative for visual disturbance. Cardiovascular: Negative for chest pain. Gastrointestinal: Negative for abdominal pain, constipation and diarrhea.    Musculoskeletal: Positive for arthralgias, gait problem and stiffness. Negative for back pain, gout, joint swelling, myalgias, neck pain and neck stiffness. Skin: Negative for itching and rash. Neurological: Negative for tingling, numbness and headaches. Psychiatric/Behavioral: Negative for sleep disturbance. Objective:      Physical Exam  Vitals signs and nursing note reviewed. Constitutional:       General: He is not in acute distress. Appearance: He is not ill-appearing, toxic-appearing or diaphoretic. HENT:      Head: Normocephalic and atraumatic. Cardiovascular:      Pulses:           Dorsalis pedis pulses are 2+ on the left side. Posterior tibial pulses are 2+ on the left side. Musculoskeletal:      Right ankle: Normal.      Left ankle: Normal.      Right foot: Normal.      Left foot: Normal range of motion and normal capillary refill. Tenderness and bony tenderness present. No swelling, crepitus, deformity, laceration, bunion, Charcot foot, foot drop or prominent metatarsal heads. Feet:      Left foot:      Skin integrity: Callus and dry skin present. No ulcer, blister, skin breakdown, erythema, warmth or fissure. Toenail Condition: Left toenails are abnormally thick. Neurological:      Mental Status: He is alert. /86 (Site: Left Upper Arm, Position: Sitting, Cuff Size: Medium Adult)   Pulse 85   Temp 97.5 °F (36.4 °C) (Tympanic)   Ht 6' (1.829 m)   Wt 261 lb (118.4 kg)   SpO2 98%   BMI 35.40 kg/m²     Assessment:       Diagnosis Orders   1. Intractable left heel pain     2. Bone spur     3. Achilles tendinitis of left lower extremity               Plan:       No follow-ups on file. No orders of the defined types were placed in this encounter.     Orders Placed This Encounter   Medications    predniSONE (DELTASONE) 20 MG tablet     Sig: 3 tabs x 3 days, then 2 tabs x 3 days, then 1 tab x 3 days     Dispense:  18 tablet     Refill:  0    exercises given to start daily   Elevate at rest  Prednisone taper   Can do topicals as well   Heat or ice as needed   Use insole you have at home with more supportive foot wear   F/u or call in 2-3 weeks if sxs persist for a podiatry referral   Call with q/c    Patientgiven educational materials - see patient instructions. Discussed use, benefit,and side effects of prescribed medications. All patient questions answered. Ptvoiced understanding. Reviewed health maintenance. Instructed to continue currentmedications, diet and exercise. Patient agreed with treatment plan. Follow up asdirected.      Electronically signed by Te Jacobsen DO on 7/8/2020 at 2:38 PM

## 2020-07-08 NOTE — PATIENT INSTRUCTIONS
Patient Education        Bursitis: Care Instructions  Your Care Instructions     A bursa is a small sac of fluid that helps the tissues around a joint slide over one another easily. Injury or overuse of a joint can cause pain, redness, and inflammation in the bursa (bursitis). Bursitis usually gets better if you avoid the activity that caused it. You can help prevent bursitis from coming back by doing stretching and strengthening exercises. You may also need to change the way you do some activities. Follow-up care is a key part of your treatment and safety. Be sure to make and go to all appointments, and call your doctor if you are having problems. It's also a good idea to know your test results and keep a list of the medicines you take. How can you care for yourself at home? · Put ice or a cold pack on the area for 10 to 20 minutes at a time. Try to do this every 1 to 2 hours for the next 3 days (when you are awake) or until the swelling goes down. Put a thin cloth between the ice and your skin. · After the 3 days of using ice, you may use heat on the area. You can use a hot water bottle; a warm, moist towel; or a heating pad set on low. You can also try alternating heat and ice. · Rest the area where you have pain. Stop any activities that cause pain. Switch to activities that do not stress the area. · Take pain medicines exactly as directed. ? If the doctor gave you a prescription medicine for pain, take it as prescribed. ? If you are not taking a prescription pain medicine, ask your doctor if you can take an over-the-counter medicine. ? Do not take two or more pain medicines at the same time unless the doctor told you to. Many pain medicines have acetaminophen, which is Tylenol. Too much acetaminophen (Tylenol) can be harmful. · To prevent stiffness, gently move the joint as much as you can without pain every day. As the pain gets better, keep doing range-of-motion exercises.  Ask your doctor for exercises that will make the muscles around the joint stronger. Do these as directed. · You can slowly return to the activity that caused the pain, but do it with less effort until you can do it without pain or swelling. Be sure to warm up before and stretch after you do the activity. When should you call for help? Call your doctor now or seek immediate medical care if:  · You have new or worse symptoms of infection, such as:  ? Increased pain, swelling, warmth, or redness. ? Red streaks leading from the area. ? Pus draining from the area. ? A fever. Watch closely for changes in your health, and be sure to contact your doctor if:  · You do not get better as expected. Where can you learn more? Go to https://[employer].Crossbar. org and sign in to your Pyreos account. Enter V369 in the [employer] box to learn more about \"Bursitis: Care Instructions. \"     If you do not have an account, please click on the \"Sign Up Now\" link. Current as of: March 2, 2020               Content Version: 12.5  © 7492-0416 [employer]. Care instructions adapted under license by [facility] ([facility]). If you have questions about a medical condition or this instruction, always ask your healthcare professional. [address] any warranty or liability for your use of this information. Patient Education        Foot Pain: Care Instructions  Your Care Instructions  Foot injuries that cause pain and swelling are fairly common. Almost all sports or home repair projects can cause a misstep that ends up as foot pain. Normal wear and tear, especially as you get older, also can cause foot pain. Most minor foot injuries will heal on their own, and home treatment is usually all you need to do. If you have a severe injury, you may need tests and treatment. Follow-up care is a key part of your treatment and safety.  Be sure to make and go to all appointments, and call your doctor if you are having problems. It's also a good idea to know your test results and keep a list of the medicines you take. How can you care for yourself at home? · Take pain medicines exactly as directed. ? If the doctor gave you a prescription medicine for pain, take it as prescribed. ? If you are not taking a prescription pain medicine, ask your doctor if you can take an over-the-counter medicine. · Rest and protect your foot. Take a break from any activity that may cause pain. · Put ice or a cold pack on your foot for 10 to 20 minutes at a time. Put a thin cloth between the ice and your skin. · Prop up the sore foot on a pillow when you ice it or anytime you sit or lie down during the next 3 days. Try to keep it above the level of your heart. This will help reduce swelling. · Your doctor may recommend that you wrap your foot with an elastic bandage. Keep your foot wrapped for as long as your doctor advises. · If your doctor recommends crutches, use them as directed. · Wear roomy footwear. · As soon as pain and swelling end, begin gentle exercises of your foot. Your doctor can tell you which exercises will help. When should you call for help? [id number] anytime you think you may need emergency care. For example, call if:  · Your foot turns pale, white, blue, or cold. Call your doctor now or seek immediate medical care if:  · You cannot move or stand on your foot. · Your foot looks twisted or out of its normal position. · Your foot is not stable when you step down. · You have signs of infection, such as:  ? Increased pain, swelling, warmth, or redness. ? Red streaks leading from the sore area. ? Pus draining from a place on your foot. ? A fever. · Your foot is numb or tingly. Watch closely for changes in your health, and be sure to contact your doctor if:  · You do not get better as expected. · You have bruises from an injury that last longer than 2 weeks. Where can you learn more?   Go to https://chpepiceweb.OptiMedica. org and sign in to your Easy [employer]rics account. Enter F024 in the [employer] box to learn more about \"Foot Pain: Care Instructions. \"     If you do not have an account, please click on the \"Sign Up Now\" link. Current as of: March 2, 2020               Content Version: 12.5  © 6675-7329 [employer]. Care instructions adapted under license by [facility] ([facility]). If you have questions about a medical condition or this instruction, always ask your healthcare professional. [place] [address] any warranty or liability for your use of this information. Patient Education        Arch Pain: Exercises  Introduction  Here are some examples of exercises for you to try. The exercises may be suggested for a condition or for rehabilitation. Start each exercise slowly. Ease off the exercises if you start to have pain. You will be told when to start these exercises and which ones will work best for you. How to do the exercises  Plantar fascia stretch   1. Sit in a chair and put your affected foot on your other knee. 2. Hold the heel of your foot in one hand, and grasp your toes with the other hand. 3. Pull on your heel (toward your body), and at the same time pull your toes back with your other hand. 4. You should feel a stretch along the bottom of your foot. 5. Hold 15 to 30 seconds. 6. Repeat 2 to 4 times. Plantar fascia stretch (kneeling)   You may want to place a pillow under your knees for this exercise. 1. Get on your hands and knees on the floor. Keep your heels pointing up and the balls of your feet and your toes on the floor. 2. Slowly sit back toward your ankles. 3. If this is too hard, you can try doing it one leg at a time. Stand up, and then kneel on one knee and keep the other leg forward. Place the foot of your forward leg flat on the ground and bend that knee. The heel on the leg still behind you should point up.  The ball and toes of that foot should be on the floor. Sit back toward that ankle. 4. Hold 15 to 30 seconds. 5. Repeat 2 to 4 times. Switch legs if you are doing this one leg at a time. Plantar fascia self-massage   1. Sit in a chair. 2. Place your affected foot on a firm, tube-shaped object, such as a can or water bottle. 3. Roll your foot back and forth over the object to massage the bottom of your foot. 4. If you want to do ice massage, fill a water bottle about three-fourths of the way full and freeze before using. 5. Continue for 2 to 5 minutes. Bilateral calf stretch (knees straight)   1. Place a book on the floor a few inches from a wall or countertop, and put the balls of your feet on it. Your heels should be on the floor. The book needs to be thick enough so that you can feel a gentle stretch in each calf. If you are not steady on your feet, hold on to a chair, counter, or wall while you do this stretch. 2. Keep your knees straight, and lean forward until you feel a stretch in each calf. 3. To get more stretch, add another book or use a thicker book, such as a phone book, a dictionary, or an encyclopedia. 4. Hold the stretch for at least 15 to 30 seconds. 5. Repeat 2 to 4 times. Bilateral calf stretch (knees bent)   1. Place a book on the floor a few inches from a wall or countertop, and put the balls of your feet on it. Your heels should be on the floor. The book needs to be thick enough so that you can feel a gentle stretch in each calf. If you are not steady on your feet, hold on to a chair, counter, or wall while you do this stretch. 2. Bend your knees, and lean forward until you feel a stretch in each calf. 3. To get more stretch, add another book or use a thicker book, such as a phone book, a dictionary, or an encyclopedia. 4. Hold the stretch for at least 15 to 30 seconds. 5. Repeat 2 to 4 times. [place] pick-ups   1.  Put some marbles on the floor next to a cup.  2. Sit down, and use the toes of your affected foot to lift up one marble from the floor at a time. Then try to put the marble in the cup.  3. Repeat 8 to 12 times. Towel scrunches   1. Sit down, and place your affected foot on a towel on the floor. You may also do this with both feet on the towel. 2. Scrunch the towel toward you with your toes. Then use your toes to push the towel back into place. 3. Repeat 8 to 12 times. Heel raises on a step   1. Stand on the bottom step of a staircase, facing up toward the stairs. Put the balls of your feet on the step. If you are not steady on your feet, hold on to the banister or wall. 2. Keeping both knees straight, slowly lift your heels above the step so that you are standing on your toes. Then slowly lower your heels below the step and toward the floor. 3. Return to the starting position, with your feet even with the step. 4. Repeat 8 to 12 times. Follow-up care is a key part of your treatment and safety. Be sure to make and go to all appointments, and call your doctor if you are having problems. It's also a good idea to know your test results and keep a list of the medicines you take. Where can you learn more? Go to https://[email or web address].Memolane. org and sign in to your Ch[employer] account. Enter H119 in the [facility] box to learn more about \"Arch Pain: Exercises. \"     If you do not have an account, please click on the \"Sign Up Now\" link. Current as of: March 2, 2020               Content Version: 12.5  © 2144-5714 [employer]. Care instructions adapted under license by [facility] ([facility]). If you have questions about a medical condition or this instruction, always ask your healthcare professional. [place] [address] any warranty or liability for your use of this information.

## 2020-07-08 NOTE — PROGRESS NOTES
Visit Information    Have you changed or started any medications since your last visit including any over-the-counter medicines, vitamins, or herbal medicines? no   Are you having any side effects from any of your medications? -  no  Have you stopped taking any of your medications? Is so, why? -  no    Have you seen any other physician or provider since your last visit? No  Have you had any other diagnostic tests since your last visit? No  Have you been seen in the emergency room and/or had an admission to a hospital since we last saw you? No  Have you had your routine dental cleaning in the past 6 months? no    Have you activated your [employer] account? If not, what are your barriers?  No:      Patient Care Team:  Dae Genao DO as PCP - General (Family Medicine)  Dae Genao DO as PCP - [facility]    Medical History Review  Past Medical, Family, and Social History reviewed and does contribute to the patient presenting condition    Health Maintenance   Topic Date Due    AAA screen  1955    Shingles Vaccine (1 of 2) 12/14/2004    A1C test (Diabetic or Prediabetic)  12/05/2018    Pneumococcal 65+ years Vaccine (1 of 1 - PPSV23) 12/14/2019    Hepatitis C screen  12/31/2020 (Originally 1955)    HIV screen  12/30/2021 (Originally 12/14/1970)    Flu vaccine (1) 09/01/2020    Colon cancer screen colonoscopy  04/11/2021    Lipid screen  12/05/2022    DTaP/Tdap/Td vaccine (2 - Td) 12/04/2027    Hepatitis A vaccine  Aged Out    Hepatitis B vaccine  Aged Out    Hib vaccine  Aged Out    Meningococcal (ACWY) vaccine  Aged Out

## 2020-12-22 ENCOUNTER — TELEPHONE (OUTPATIENT)
Dept: FAMILY MEDICINE CLINIC | Age: 65
End: 2020-12-22

## 2020-12-22 ENCOUNTER — OFFICE VISIT (OUTPATIENT)
Dept: FAMILY MEDICINE CLINIC | Age: 65
End: 2020-12-22
Payer: COMMERCIAL

## 2020-12-22 ENCOUNTER — HOSPITAL ENCOUNTER (OUTPATIENT)
Age: 65
Setting detail: SPECIMEN
Discharge: HOME | End: 2020-12-22
Payer: COMMERCIAL

## 2020-12-22 VITALS — TEMPERATURE: 97.1 F | HEART RATE: 89 BPM | OXYGEN SATURATION: 99 % | WEIGHT: 261 LBS | BODY MASS INDEX: 35.4 KG/M2

## 2020-12-22 PROCEDURE — 1123F ACP DISCUSS/DSCN MKR DOCD: CPT | Performed by: NURSE PRACTITIONER

## 2020-12-22 PROCEDURE — 4040F PNEUMOC VAC/ADMIN/RCVD: CPT | Performed by: NURSE PRACTITIONER

## 2020-12-22 PROCEDURE — G8417 CALC BMI ABV UP PARAM F/U: HCPCS | Performed by: NURSE PRACTITIONER

## 2020-12-22 PROCEDURE — 99213 OFFICE O/P EST LOW 20 MIN: CPT | Performed by: NURSE PRACTITIONER

## 2020-12-22 PROCEDURE — G8427 DOCREV CUR MEDS BY ELIG CLIN: HCPCS | Performed by: NURSE PRACTITIONER

## 2020-12-22 PROCEDURE — 1036F TOBACCO NON-USER: CPT | Performed by: NURSE PRACTITIONER

## 2020-12-22 PROCEDURE — 3017F COLORECTAL CA SCREEN DOC REV: CPT | Performed by: NURSE PRACTITIONER

## 2020-12-22 PROCEDURE — G8484 FLU IMMUNIZE NO ADMIN: HCPCS | Performed by: NURSE PRACTITIONER

## 2020-12-22 ASSESSMENT — ENCOUNTER SYMPTOMS
VOICE CHANGE: 0
EYE DISCHARGE: 0
SHORTNESS OF BREATH: 0
EYE REDNESS: 0
CHEST TIGHTNESS: 0
WHEEZING: 0
SINUS PRESSURE: 0
SORE THROAT: 0
COUGH: 1

## 2020-12-22 NOTE — PROGRESS NOTES
[address] [facility]  [address]  [address] [facility] [address] [address]  Dept: [phone]  Dept Fax: [phone]     Francy Norwood is a 76 y.o. male who presents to the urgent care today for his medicalconditions/complaints as noted below. Francy Norwood is c/o of Covid Testing (congestion cough headache X 4)    HPI:      Sinusitis  This is a new problem. Episode onset: 4 days ago. The problem is unchanged. There has been no fever. Associated symptoms include congestion, coughing and headaches. Pertinent negatives include no chills, ear pain, shortness of breath, sinus pressure, sneezing or sore throat. Past treatments include nothing. The treatment provided no relief. Son tested positive for COVID-19 recently. Past Medical History:   Diagnosis Date    Prediabetes     no RX    Umbilical hernia       No current outpatient medications on file. No current facility-administered medications for this visit. No Known Allergies    Reviewed PMH, SH, and  with the patient and updated. Subjective:      Review of Systems   Constitutional: Negative for chills, fatigue and fever. HENT: Positive for congestion. Negative for ear discharge, ear pain, postnasal drip, sinus pressure, sneezing, sore throat and voice change. Eyes: Negative for discharge and redness. Respiratory: Positive for cough. Negative for chest tightness, shortness of breath and wheezing. Cardiovascular: Negative. Negative for chest pain. Musculoskeletal: Negative for myalgias. Skin: Negative for rash. Neurological: Positive for headaches. Negative for dizziness, weakness and light-headedness. Hematological: Negative for adenopathy. All other systems reviewed and are negative. Objective:      Physical Exam  Vitals signs and nursing note reviewed. Constitutional:       General: He is not in acute distress. Appearance: Normal appearance. He is well-developed. He is not ill-appearing, toxic-appearing or diaphoretic. HENT:      Head: Normocephalic. Right Ear: Tympanic membrane and external ear normal.      Left Ear: Tympanic membrane and external ear normal.      Nose: Nose normal.      Right Sinus: No maxillary sinus tenderness or frontal sinus tenderness. Left Sinus: No maxillary sinus tenderness or frontal sinus tenderness. Mouth/Throat:      Pharynx: No oropharyngeal exudate or posterior oropharyngeal erythema. Eyes:      General:         Right eye: No discharge. Left eye: No discharge. Cardiovascular:      Rate and Rhythm: Normal rate and regular rhythm. Heart sounds: Normal heart sounds. No murmur. Pulmonary:      Effort: Pulmonary effort is normal. No respiratory distress. Breath sounds: Normal breath sounds. No wheezing or rales. Lymphadenopathy:      Cervical: No cervical adenopathy. Skin:     General: Skin is warm. Findings: No rash. Neurological:      Mental Status: He is alert. Pulse 89   Temp 97.1 °F (36.2 °C) (Tympanic)   Wt 261 lb (118.4 kg)   SpO2 99%   BMI 35.40 kg/m²     Assessment:       Diagnosis Orders   1. Cough  COVID-19 Ambulatory       Plan: Will send out COVID19 testing. Possible treatment alterations based on the results. Patient instructed to self-quarantine until testing results are back. Patient instructed not to return to work until results are back. Tylenol as needed for fever/pain. Encouraged adequate hydration and rest.  The patient indicates understanding of these issues and agrees with the plan. Educational materials provided on AVS.  Follow up if symptoms do not improve/worsen. Discussed symptoms that will warrant urgent ED evaluation/treatment. Patient given educational materials - see patient instructions. Discussed use, benefit, and side effects of prescribed medications. All patientquestions answered. Pt voiced understanding.     Electronically signed by SHELDON Jones CNP on 12/24/2020at 9:15 AM

## 2020-12-24 LAB — SARS-COV-2, NAA: DETECTED

## 2020-12-25 ENCOUNTER — TELEPHONE (OUTPATIENT)
Dept: FAMILY MEDICINE CLINIC | Age: 65
End: 2020-12-25

## 2022-01-20 ENCOUNTER — OFFICE VISIT (OUTPATIENT)
Dept: FAMILY MEDICINE CLINIC | Age: 67
End: 2022-01-20
Payer: COMMERCIAL

## 2022-01-20 VITALS
SYSTOLIC BLOOD PRESSURE: 136 MMHG | RESPIRATION RATE: 16 BRPM | HEIGHT: 72 IN | DIASTOLIC BLOOD PRESSURE: 84 MMHG | OXYGEN SATURATION: 98 % | BODY MASS INDEX: 36.65 KG/M2 | HEART RATE: 91 BPM | TEMPERATURE: 96.9 F | WEIGHT: 270.6 LBS

## 2022-01-20 DIAGNOSIS — K63.5 POLYP OF COLON, UNSPECIFIED PART OF COLON, UNSPECIFIED TYPE: ICD-10-CM

## 2022-01-20 DIAGNOSIS — Z13.1 DIABETES MELLITUS SCREENING: ICD-10-CM

## 2022-01-20 DIAGNOSIS — Z12.5 PROSTATE CANCER SCREENING: ICD-10-CM

## 2022-01-20 DIAGNOSIS — Z13.29 THYROID DISORDER SCREENING: ICD-10-CM

## 2022-01-20 DIAGNOSIS — Z13.220 LIPID SCREENING: ICD-10-CM

## 2022-01-20 DIAGNOSIS — R31.9 HEMATURIA, UNSPECIFIED TYPE: Primary | ICD-10-CM

## 2022-01-20 LAB
BILIRUBIN, POC: NEGATIVE
BLOOD URINE, POC: NORMAL
CLARITY, POC: CLEAR
COLOR, POC: YELLOW
GLUCOSE URINE, POC: NEGATIVE
KETONES, POC: NEGATIVE
LEUKOCYTE EST, POC: NEGATIVE
NITRITE, POC: NEGATIVE
PH, POC: 5
PROTEIN, POC: NEGATIVE
SPECIFIC GRAVITY, POC: 1.02
UROBILINOGEN, POC: 0.2

## 2022-01-20 PROCEDURE — 4040F PNEUMOC VAC/ADMIN/RCVD: CPT | Performed by: NURSE PRACTITIONER

## 2022-01-20 PROCEDURE — 1123F ACP DISCUSS/DSCN MKR DOCD: CPT | Performed by: NURSE PRACTITIONER

## 2022-01-20 PROCEDURE — G8417 CALC BMI ABV UP PARAM F/U: HCPCS | Performed by: NURSE PRACTITIONER

## 2022-01-20 PROCEDURE — 3017F COLORECTAL CA SCREEN DOC REV: CPT | Performed by: NURSE PRACTITIONER

## 2022-01-20 PROCEDURE — 1036F TOBACCO NON-USER: CPT | Performed by: NURSE PRACTITIONER

## 2022-01-20 PROCEDURE — G8427 DOCREV CUR MEDS BY ELIG CLIN: HCPCS | Performed by: NURSE PRACTITIONER

## 2022-01-20 PROCEDURE — 81002 URINALYSIS NONAUTO W/O SCOPE: CPT | Performed by: NURSE PRACTITIONER

## 2022-01-20 PROCEDURE — 99214 OFFICE O/P EST MOD 30 MIN: CPT | Performed by: NURSE PRACTITIONER

## 2022-01-20 PROCEDURE — G8484 FLU IMMUNIZE NO ADMIN: HCPCS | Performed by: NURSE PRACTITIONER

## 2022-01-20 RX ORDER — CIPROFLOXACIN 250 MG/1
250 TABLET, FILM COATED ORAL 2 TIMES DAILY
Qty: 14 TABLET | Refills: 0 | Status: SHIPPED | OUTPATIENT
Start: 2022-01-20 | End: 2022-01-27

## 2022-01-20 ASSESSMENT — ENCOUNTER SYMPTOMS
SHORTNESS OF BREATH: 0
DIARRHEA: 0
NAUSEA: 0
ABDOMINAL PAIN: 0
VOMITING: 0
SORE THROAT: 0
EYE PAIN: 0
BACK PAIN: 0
COUGH: 0
SINUS PAIN: 0

## 2022-01-20 ASSESSMENT — PATIENT HEALTH QUESTIONNAIRE - PHQ9
2. FEELING DOWN, DEPRESSED OR HOPELESS: 0
SUM OF ALL RESPONSES TO PHQ QUESTIONS 1-9: 0
SUM OF ALL RESPONSES TO PHQ9 QUESTIONS 1 & 2: 0
SUM OF ALL RESPONSES TO PHQ QUESTIONS 1-9: 0
1. LITTLE INTEREST OR PLEASURE IN DOING THINGS: 0

## 2022-01-20 NOTE — PROGRESS NOTES
[id number] [address] [facility]  [address] [address]  Dept: [phone]  Dept Fax: [phone]    Sangita Mello is a 78 y.o. male who presents today for his medicalconditions/complaints as noted below. Sangita Mello is c/o of New Patient, Hematuria (pt goes to exercise and walks 2 miles then comes home and his urine is pink for a hour. ongoing for a month ), and Health Maintenance (needs order for colonoscopy. )      HPI:       13-year-old male patient presents with complaints of hematuria. Patient reports that he first noticed the symptoms approximately 1 month ago. Reports that when he walks for period of time he develops a pink discoloration of his urine. Reports that the next 2 times he urinates after this he notices this which resolves throughout the day. Reports that he has always had some urinary frequency and dysuria but believes this to be secondary to age. Denies any back pain or flank pain. Denies fevers chills nausea or vomiting. Denies any family history of urologic problems. Denies any personal history of urologic problems. History of colon polyp last noted in colonoscopy 2018 recommended 3-year recall on colonoscopy      Past Medical History:   Diagnosis Date    Prediabetes     no RX    Umbilical hernia         Current Outpatient Medications   Medication Sig Dispense Refill    ciprofloxacin (CIPRO) 250 MG tablet Take 1 tablet by mouth 2 times daily for 7 days 14 tablet 0     No current facility-administered medications for this visit. No Known Allergies    Subjective:      Review of Systems   Constitutional: Negative for chills and fatigue. HENT: Negative for congestion, ear pain, sinus pain and sore throat. Eyes: Negative for pain and visual disturbance. Respiratory: Negative for cough and shortness of breath. Cardiovascular: Negative for chest pain and palpitations.    Gastrointestinal: Negative for abdominal pain, diarrhea, nausea and vomiting. Genitourinary: Positive for hematuria. Negative for dysuria, penile pain and testicular pain. Musculoskeletal: Negative for back pain, joint swelling and neck pain. Skin: Negative for rash. Neurological: Negative for dizziness and light-headedness. Hematological: Does not bruise/bleed easily. All other systems reviewed and are negative.      :Objective     Physical Exam  Vitals and nursing note reviewed. Constitutional:       General: He is not in acute distress. Appearance: Normal appearance. He is obese. He is not toxic-appearing. HENT:      Mouth/Throat:      Mouth: Mucous membranes are moist.   Cardiovascular:      Rate and Rhythm: Normal rate. Pulmonary:      Effort: Pulmonary effort is normal.      Breath sounds: Normal breath sounds. Abdominal:      Tenderness: There is no right CVA tenderness or left CVA tenderness. Skin:     General: Skin is warm and dry. Neurological:      General: No focal deficit present. Mental Status: He is alert and oriented to person, place, and time. /84 (Site: Left Upper Arm, Position: Sitting, Cuff Size: Large Adult)   Pulse 91   Temp 96.9 °F (36.1 °C) (Tympanic)   Resp 16   Ht 6' (1.829 m)   Wt 270 lb 9.6 oz (122.7 kg)   SpO2 98%   BMI 36.70 kg/m²     Lab Review   No visits with results within 6 Month(s) from this visit. Latest known visit with results is:   Hospital Outpatient Visit on 12/22/2020   Component Date Value    SARS-CoV-2, KEENA 12/22/2020 Detected*       Assessment and Plan      1. Hematuria, unspecified type  -     POCT Urinalysis no Micro  -     CBC; Future  -     ciprofloxacin (CIPRO) 250 MG tablet; Take 1 tablet by mouth 2 times daily for 7 days, Disp-14 tablet, R-0Normal  -     [facility]) - Peyman Buckley MD, Urology, [facility]  2. Lipid screening  -     CBC; Future  -     Lipid, Fasting; Future  3. Thyroid disorder screening  -     CBC;  Future  -     TSH With Reflex Ft4; Future  4. Diabetes mellitus screening  -     CBC; Future  -     Comprehensive Metabolic Panel; Future  -     Hemoglobin A1C; Future  5. Prostate cancer screening  -     CBC; Future  -     PSA Screening; Future  6. Polyp of colon, unspecified part of colon, unspecified type  -     [facility] Screening Colonoscopy     Plan for routine labs including CBC, CMP, lipids, TSH, A1c, PSA  Hematuria noted on urinalysis, concern for prostatitis will place empirically on Cipro  Urology referral placed  Will evaluate renal function, PSA levels, cbc  Colonoscopy referral placed placed            Results for orders placed or performed in visit on 01/20/22   POCT Urinalysis no Micro   Result Value Ref Range    Color, UA yellow     Clarity, UA clear     Glucose, UA POC negative     Bilirubin, UA negative     Ketones, UA negative     Spec Grav, UA 1.025     Blood, UA POC moderate     pH, UA 5.0     Protein, UA POC negative     Urobilinogen, UA 0.2     Leukocytes, UA negative     Nitrite, UA negative              Return if symptoms worsen or fail to improve. Orders Placed This Encounter   Medications    ciprofloxacin (CIPRO) 250 MG tablet     Sig: Take 1 tablet by mouth 2 times daily for 7 days     Dispense:  14 tablet     Refill:  0        Patient given educational materials - see patient instructions. Discussed use, benefit, and side effects of prescribed medications. All patientquestions answered. Pt voiced understanding. Patient given educational materials - see patient instructions. Discussed use, benefit, and side effects of prescribed medications. All patientquestions answered. Pt voiced understanding. This note was transcribed using dictation with Dragon services. Efforts were made to correct any errors but some words may be misinterpreted.     Patient assumes risks associated with failure to complete recommended testing and treatments in a timely manner    Electronically signed by Angelika Hassan SHELDON Mendoza - CNP on 1/20/2022at 8:27 AM

## 2022-01-20 NOTE — PROGRESS NOTES
Visit Information    Have you changed or started any medications since your last visit including any over-the-counter medicines, vitamins, or herbal medicines? no   Are you having any side effects from any of your medications? -  no  Have you stopped taking any of your medications? Is so, why? -  no    Have you seen any other physician or provider since your last visit? No  Have you had any other diagnostic tests since your last visit? No  Have you been seen in the emergency room and/or had an admission to a hospital since we last saw you? No  Have you had your routine dental cleaning in the past 6 months? no    Have you activated your [email or web address] account? If not, what are your barriers?  Yes     Patient Care Team:  SHELDON Dillard - CNP as PCP - General (Certified Nurse Practitioner)    Medical History Review  Past Medical, Family, and Social History reviewed and does contribute to the patient presenting condition    Health Maintenance   Topic Date Due    AAA screen  Never done    Hepatitis C screen  Never done    Depression Screen  Never done    Shingles Vaccine (1 of 2) Never done    A1C test (Diabetic or Prediabetic)  12/05/2018    Pneumococcal 65+ years Vaccine (1 of 1 - PPSV23) Never done    Colon cancer screen colonoscopy  04/11/2021    Flu vaccine (1) 09/01/2021    Lipid screen  12/05/2022    DTaP/Tdap/Td vaccine (2 - Td or Tdap) 12/04/2027    COVID-19 Vaccine  Completed    Hepatitis A vaccine  Aged Out    Hepatitis B vaccine  Aged Out    Hib vaccine  Aged Out    Meningococcal (ACWY) vaccine  Aged Out

## 2022-01-20 NOTE — PATIENT INSTRUCTIONS
Patient Education        Colon Polyps: Care Instructions  Your Care Instructions     Colon polyps are growths in the colon or the rectum. The cause of most colon polyps is not known, and most people who get them do not have any problems. But a certain kind can turn into cancer. For this reason, regular testing for colon polyps is important for people as they get older. It is also important for anyone who has an increased risk for colon cancer. Polyps are usually found through routine colon cancer screening tests. Although most colon polyps are not cancerous, they are usually removed and then tested for cancer. Screening for colon cancer saves lives because the cancer can usually be cured if it is caught early. If you have a polyp that is the type that can turn into cancer, you may need more tests to examine your entire colon. The doctor will remove any other polyps that he or she finds, and you will be tested more often. Follow-up care is a key part of your treatment and safety. Be sure to make and go to all appointments, and call your doctor if you are having problems. It's also a good idea to know your test results and keep a list of the medicines you take. How can you care for yourself at home? Regular exams to look for colon polyps are the best way to prevent polyps from turning into colon cancer. These can include stool tests, sigmoidoscopy, colonoscopy, and CT colonography. Talk with your doctor about a testing schedule that is right for you. To prevent polyps  There is no home treatment that can prevent colon polyps. But these steps may help lower your risk for cancer. · Stay active. Being active can help you get to and stay at a healthy weight. Try to exercise on most days of the week. Walking is a good choice. · Eat well. Choose a variety of vegetables, fruits, legumes (such as peas and beans), fish, poultry, and whole grains. · Do not smoke.  If you need help quitting, talk to your doctor about stop-smoking programs and medicines. These can increase your chances of quitting for good. · If you drink alcohol, limit how much you drink. Limit alcohol to 2 drinks a day for men and 1 drink a day for women. When should you call for help? Call your doctor now or seek immediate medical care if:    · You have severe belly pain.     · Your stools are maroon or very bloody. Watch closely for changes in your health, and be sure to contact your doctor if:    · You have a fever.     · You have nausea or vomiting.     · You have a change in bowel habits (new constipation or diarrhea).     · Your symptoms get worse or are not improving as expected. Where can you learn more? Go to https://Total[employer]eb[employer]. org and sign in to your [employer] account. Enter 95 [id number] in the [employer] box to learn more about \"Colon Polyps: Care Instructions. \"     If you do not have an account, please click on the \"Sign Up Now\" link. Current as of: September 8, 2021               Content Version: 13.1  © 2006-2021 Healthwise, Incorporated. Care instructions adapted under license by [facility] ([facility]). If you have questions about a medical condition or this instruction, always ask your healthcare professional. [place] [address] any warranty or liability for your use of this information.

## 2022-01-21 ENCOUNTER — HOSPITAL ENCOUNTER (OUTPATIENT)
Age: 67
Setting detail: SPECIMEN
Discharge: HOME | End: 2022-01-21
Payer: COMMERCIAL

## 2022-01-21 ENCOUNTER — TELEPHONE (OUTPATIENT)
Dept: SURGERY | Age: 67
End: 2022-01-21

## 2022-01-21 DIAGNOSIS — Z13.1 DIABETES MELLITUS SCREENING: ICD-10-CM

## 2022-01-21 DIAGNOSIS — R31.9 HEMATURIA, UNSPECIFIED TYPE: ICD-10-CM

## 2022-01-21 DIAGNOSIS — Z13.220 LIPID SCREENING: ICD-10-CM

## 2022-01-21 DIAGNOSIS — Z13.29 THYROID DISORDER SCREENING: ICD-10-CM

## 2022-01-21 DIAGNOSIS — Z12.5 PROSTATE CANCER SCREENING: ICD-10-CM

## 2022-01-21 LAB
ALBUMIN SERPL-MCNC: 3.9 G/DL (ref 3.5–5.2)
ALBUMIN/GLOBULIN RATIO: 1.1 (ref 1–2.5)
ALP BLD-CCNC: 81 U/L (ref 40–129)
ALT SERPL-CCNC: 18 U/L (ref 5–41)
ANION GAP SERPL CALCULATED.3IONS-SCNC: 10 MMOL/L (ref 9–17)
AST SERPL-CCNC: 16 U/L
BILIRUB SERPL-MCNC: 0.64 MG/DL (ref 0.3–1.2)
BUN BLDV-MCNC: 15 MG/DL (ref 8–23)
BUN/CREAT BLD: ABNORMAL (ref 9–20)
CALCIUM SERPL-MCNC: 8.9 MG/DL (ref 8.6–10.4)
CHLORIDE BLD-SCNC: 105 MMOL/L (ref 98–107)
CHOLESTEROL, FASTING: 202 MG/DL
CHOLESTEROL/HDL RATIO: 5.2
CO2: 25 MMOL/L (ref 20–31)
CREAT SERPL-MCNC: 1.32 MG/DL (ref 0.7–1.2)
ESTIMATED AVERAGE GLUCOSE: 117 MG/DL
GFR AFRICAN AMERICAN: >60 ML/MIN
GFR NON-AFRICAN AMERICAN: 54 ML/MIN
GFR SERPL CREATININE-BSD FRML MDRD: ABNORMAL ML/MIN/{1.73_M2}
GFR SERPL CREATININE-BSD FRML MDRD: ABNORMAL ML/MIN/{1.73_M2}
GLUCOSE BLD-MCNC: 95 MG/DL (ref 70–99)
HBA1C MFR BLD: 5.7 % (ref 4–6)
HCT VFR BLD CALC: 48.9 % (ref 40.7–50.3)
HDLC SERPL-MCNC: 39 MG/DL
HEMOGLOBIN: 15.5 G/DL (ref 13–17)
LDL CHOLESTEROL: 140 MG/DL (ref 0–130)
MCH RBC QN AUTO: 28.6 PG (ref 25.2–33.5)
MCHC RBC AUTO-ENTMCNC: 31.7 G/DL (ref 28.4–34.8)
MCV RBC AUTO: 90.2 FL (ref 82.6–102.9)
NRBC AUTOMATED: 0 PER 100 WBC
PDW BLD-RTO: 12.8 % (ref 11.8–14.4)
PLATELET # BLD: 237 K/UL (ref 138–453)
PMV BLD AUTO: 10.1 FL (ref 8.1–13.5)
POTASSIUM SERPL-SCNC: 4.2 MMOL/L (ref 3.7–5.3)
RBC # BLD: 5.42 M/UL (ref 4.21–5.77)
SODIUM BLD-SCNC: 140 MMOL/L (ref 135–144)
TOTAL PROTEIN: 7.4 G/DL (ref 6.4–8.3)
TRIGLYCERIDE, FASTING: 114 MG/DL
TSH SERPL DL<=0.05 MIU/L-ACNC: 0.47 MIU/L (ref 0.3–5)
VLDLC SERPL CALC-MCNC: ABNORMAL MG/DL (ref 1–30)
WBC # BLD: 5.2 K/UL (ref 3.5–11.3)

## 2022-01-21 PROCEDURE — G0103 PSA SCREENING: HCPCS

## 2022-01-21 PROCEDURE — 80061 LIPID PANEL: CPT

## 2022-01-21 PROCEDURE — 80053 COMPREHEN METABOLIC PANEL: CPT

## 2022-01-21 PROCEDURE — 84443 ASSAY THYROID STIM HORMONE: CPT

## 2022-01-21 PROCEDURE — 36415 COLL VENOUS BLD VENIPUNCTURE: CPT

## 2022-01-21 PROCEDURE — 85027 COMPLETE CBC AUTOMATED: CPT

## 2022-01-21 PROCEDURE — 83036 HEMOGLOBIN GLYCOSYLATED A1C: CPT

## 2022-01-21 NOTE — TELEPHONE ENCOUNTER
1/21/2022- Spoke to patient. Surgery scheduled at [facility]. Patient confirmed and information mailed to patient.      Surgery date/time: 3/10/2022 at 8:00am  Arrival time: 6:30am  Prep appt: 3/1/2022 at 2:00pm  *vaccinated

## 2022-01-22 LAB — PROSTATE SPECIFIC ANTIGEN: 5.02 UG/L

## 2022-02-28 ENCOUNTER — TELEPHONE (OUTPATIENT)
Dept: SURGERY | Age: 67
End: 2022-02-28

## 2022-02-28 RX ORDER — POLYETHYLENE GLYCOL 3350, SODIUM SULFATE ANHYDROUS, SODIUM BICARBONATE, SODIUM CHLORIDE, POTASSIUM CHLORIDE 236; 22.74; 6.74; 5.86; 2.97 G/4L; G/4L; G/4L; G/4L; G/4L
POWDER, FOR SOLUTION ORAL
Qty: 1 EACH | Refills: 0 | Status: ON HOLD | OUTPATIENT
Start: 2022-02-28 | End: 2022-03-10 | Stop reason: HOSPADM

## 2022-03-01 ENCOUNTER — TELEPHONE (OUTPATIENT)
Dept: SURGERY | Age: 67
End: 2022-03-01

## 2022-03-01 NOTE — TELEPHONE ENCOUNTER
3/1/2022-Patient arrived for bowel prep. PEG instructions reviewed with patient, all questions answered and patient verbalized understanding.

## 2022-03-02 ENCOUNTER — HOSPITAL ENCOUNTER (OUTPATIENT)
Dept: CT IMAGING | Age: 67
Discharge: HOME | End: 2022-03-04
Payer: COMMERCIAL

## 2022-03-02 DIAGNOSIS — R31.0 GROSS HEMATURIA: ICD-10-CM

## 2022-03-02 PROCEDURE — 74178 CT ABD&PLV WO CNTR FLWD CNTR: CPT

## 2022-03-02 PROCEDURE — 2580000003 HC RX 258: Performed by: SPECIALIST

## 2022-03-02 PROCEDURE — 6360000004 HC RX CONTRAST MEDICATION: Performed by: SPECIALIST

## 2022-03-02 RX ORDER — SODIUM CHLORIDE 0.9 % (FLUSH) 0.9 %
10 SYRINGE (ML) INJECTION ONCE
Status: COMPLETED | OUTPATIENT
Start: 2022-03-02 | End: 2022-03-02

## 2022-03-02 RX ORDER — 0.9 % SODIUM CHLORIDE 0.9 %
80 INTRAVENOUS SOLUTION INTRAVENOUS ONCE
Status: COMPLETED | OUTPATIENT
Start: 2022-03-02 | End: 2022-03-02

## 2022-03-02 RX ADMIN — IOPAMIDOL 120 ML: 755 INJECTION, SOLUTION INTRAVENOUS at 08:17

## 2022-03-02 RX ADMIN — SODIUM CHLORIDE 80 ML: 9 INJECTION, SOLUTION INTRAVENOUS at 08:17

## 2022-03-02 RX ADMIN — SODIUM CHLORIDE, PRESERVATIVE FREE 10 ML: 5 INJECTION INTRAVENOUS at 08:18

## 2022-03-09 ENCOUNTER — ANESTHESIA EVENT (OUTPATIENT)
Dept: OPERATING ROOM | Age: 67
End: 2022-03-09
Payer: COMMERCIAL

## 2022-03-10 ENCOUNTER — HOSPITAL ENCOUNTER (OUTPATIENT)
Age: 67
Setting detail: OUTPATIENT SURGERY
Discharge: HOME | End: 2022-03-10
Attending: SURGERY | Admitting: SURGERY
Payer: COMMERCIAL

## 2022-03-10 ENCOUNTER — ANESTHESIA (OUTPATIENT)
Dept: OPERATING ROOM | Age: 67
End: 2022-03-10
Payer: COMMERCIAL

## 2022-03-10 VITALS
BODY MASS INDEX: 36.23 KG/M2 | HEART RATE: 70 BPM | HEIGHT: 72 IN | OXYGEN SATURATION: 100 % | DIASTOLIC BLOOD PRESSURE: 80 MMHG | SYSTOLIC BLOOD PRESSURE: 114 MMHG | RESPIRATION RATE: 19 BRPM | WEIGHT: 267.5 LBS | TEMPERATURE: 97.1 F

## 2022-03-10 VITALS
DIASTOLIC BLOOD PRESSURE: 61 MMHG | RESPIRATION RATE: 14 BRPM | TEMPERATURE: 96.8 F | SYSTOLIC BLOOD PRESSURE: 110 MMHG | OXYGEN SATURATION: 97 %

## 2022-03-10 PROCEDURE — 2580000003 HC RX 258: Performed by: ANESTHESIOLOGY

## 2022-03-10 PROCEDURE — 6360000002 HC RX W HCPCS: Performed by: SPECIALIST

## 2022-03-10 PROCEDURE — 7100000011 HC PHASE II RECOVERY - ADDTL 15 MIN: Performed by: SURGERY

## 2022-03-10 PROCEDURE — 3700000000 HC ANESTHESIA ATTENDED CARE: Performed by: SURGERY

## 2022-03-10 PROCEDURE — 3609010400 HC COLONOSCOPY POLYPECTOMY HOT BIOPSY: Performed by: SURGERY

## 2022-03-10 PROCEDURE — 88305 TISSUE EXAM BY PATHOLOGIST: CPT

## 2022-03-10 PROCEDURE — 7100000010 HC PHASE II RECOVERY - FIRST 15 MIN: Performed by: SURGERY

## 2022-03-10 PROCEDURE — 2709999900 HC NON-CHARGEABLE SUPPLY: Performed by: SURGERY

## 2022-03-10 PROCEDURE — 45385 COLONOSCOPY W/LESION REMOVAL: CPT | Performed by: SURGERY

## 2022-03-10 PROCEDURE — 3700000001 HC ADD 15 MINUTES (ANESTHESIA): Performed by: SURGERY

## 2022-03-10 PROCEDURE — 45380 COLONOSCOPY AND BIOPSY: CPT | Performed by: SURGERY

## 2022-03-10 PROCEDURE — 2500000003 HC RX 250 WO HCPCS: Performed by: SPECIALIST

## 2022-03-10 RX ORDER — SODIUM CHLORIDE 9 MG/ML
25 INJECTION, SOLUTION INTRAVENOUS PRN
Status: DISCONTINUED | OUTPATIENT
Start: 2022-03-10 | End: 2022-03-10 | Stop reason: HOSPADM

## 2022-03-10 RX ORDER — MIDAZOLAM HYDROCHLORIDE 2 MG/2ML
2 INJECTION, SOLUTION INTRAMUSCULAR; INTRAVENOUS
Status: DISCONTINUED | OUTPATIENT
Start: 2022-03-10 | End: 2022-03-10 | Stop reason: HOSPADM

## 2022-03-10 RX ORDER — SODIUM CHLORIDE 0.9 % (FLUSH) 0.9 %
5-40 SYRINGE (ML) INJECTION PRN
Status: DISCONTINUED | OUTPATIENT
Start: 2022-03-10 | End: 2022-03-10 | Stop reason: HOSPADM

## 2022-03-10 RX ORDER — LIDOCAINE HYDROCHLORIDE 10 MG/ML
INJECTION, SOLUTION EPIDURAL; INFILTRATION; INTRACAUDAL; PERINEURAL PRN
Status: DISCONTINUED | OUTPATIENT
Start: 2022-03-10 | End: 2022-03-10 | Stop reason: SDUPTHER

## 2022-03-10 RX ORDER — OXYCODONE HYDROCHLORIDE AND ACETAMINOPHEN 5; 325 MG/1; MG/1
1 TABLET ORAL
Status: DISCONTINUED | OUTPATIENT
Start: 2022-03-10 | End: 2022-03-10 | Stop reason: HOSPADM

## 2022-03-10 RX ORDER — OXYCODONE HYDROCHLORIDE AND ACETAMINOPHEN 5; 325 MG/1; MG/1
2 TABLET ORAL
Status: DISCONTINUED | OUTPATIENT
Start: 2022-03-10 | End: 2022-03-10 | Stop reason: HOSPADM

## 2022-03-10 RX ORDER — MEPERIDINE HYDROCHLORIDE 50 MG/ML
12.5 INJECTION INTRAMUSCULAR; INTRAVENOUS; SUBCUTANEOUS EVERY 5 MIN PRN
Status: DISCONTINUED | OUTPATIENT
Start: 2022-03-10 | End: 2022-03-10 | Stop reason: HOSPADM

## 2022-03-10 RX ORDER — SODIUM CHLORIDE 9 MG/ML
INJECTION, SOLUTION INTRAVENOUS CONTINUOUS
Status: DISCONTINUED | OUTPATIENT
Start: 2022-03-10 | End: 2022-03-10 | Stop reason: HOSPADM

## 2022-03-10 RX ORDER — SODIUM CHLORIDE, SODIUM LACTATE, POTASSIUM CHLORIDE, CALCIUM CHLORIDE 600; 310; 30; 20 MG/100ML; MG/100ML; MG/100ML; MG/100ML
INJECTION, SOLUTION INTRAVENOUS CONTINUOUS
Status: DISCONTINUED | OUTPATIENT
Start: 2022-03-10 | End: 2022-03-10 | Stop reason: HOSPADM

## 2022-03-10 RX ORDER — LABETALOL 20 MG/4 ML (5 MG/ML) INTRAVENOUS SYRINGE
10
Status: DISCONTINUED | OUTPATIENT
Start: 2022-03-10 | End: 2022-03-10 | Stop reason: HOSPADM

## 2022-03-10 RX ORDER — DIPHENHYDRAMINE HYDROCHLORIDE 50 MG/ML
12.5 INJECTION INTRAMUSCULAR; INTRAVENOUS
Status: DISCONTINUED | OUTPATIENT
Start: 2022-03-10 | End: 2022-03-10 | Stop reason: HOSPADM

## 2022-03-10 RX ORDER — MORPHINE SULFATE 2 MG/ML
2 INJECTION, SOLUTION INTRAMUSCULAR; INTRAVENOUS EVERY 5 MIN PRN
Status: DISCONTINUED | OUTPATIENT
Start: 2022-03-10 | End: 2022-03-10 | Stop reason: HOSPADM

## 2022-03-10 RX ORDER — SODIUM CHLORIDE 0.9 % (FLUSH) 0.9 %
10 SYRINGE (ML) INJECTION EVERY 12 HOURS SCHEDULED
Status: DISCONTINUED | OUTPATIENT
Start: 2022-03-10 | End: 2022-03-10 | Stop reason: HOSPADM

## 2022-03-10 RX ORDER — PROPOFOL 10 MG/ML
INJECTION, EMULSION INTRAVENOUS CONTINUOUS PRN
Status: DISCONTINUED | OUTPATIENT
Start: 2022-03-10 | End: 2022-03-10 | Stop reason: SDUPTHER

## 2022-03-10 RX ORDER — SODIUM CHLORIDE 0.9 % (FLUSH) 0.9 %
5-40 SYRINGE (ML) INJECTION EVERY 12 HOURS SCHEDULED
Status: DISCONTINUED | OUTPATIENT
Start: 2022-03-10 | End: 2022-03-10 | Stop reason: HOSPADM

## 2022-03-10 RX ORDER — IPRATROPIUM BROMIDE AND ALBUTEROL SULFATE 2.5; .5 MG/3ML; MG/3ML
1 SOLUTION RESPIRATORY (INHALATION)
Status: DISCONTINUED | OUTPATIENT
Start: 2022-03-10 | End: 2022-03-10 | Stop reason: HOSPADM

## 2022-03-10 RX ORDER — PROPOFOL 10 MG/ML
INJECTION, EMULSION INTRAVENOUS
Status: COMPLETED
Start: 2022-03-10 | End: 2022-03-10

## 2022-03-10 RX ORDER — PROMETHAZINE HYDROCHLORIDE 25 MG/ML
6.25 INJECTION, SOLUTION INTRAMUSCULAR; INTRAVENOUS EVERY 5 MIN PRN
Status: DISCONTINUED | OUTPATIENT
Start: 2022-03-10 | End: 2022-03-10 | Stop reason: HOSPADM

## 2022-03-10 RX ORDER — ONDANSETRON 2 MG/ML
4 INJECTION INTRAMUSCULAR; INTRAVENOUS
Status: DISCONTINUED | OUTPATIENT
Start: 2022-03-10 | End: 2022-03-10 | Stop reason: HOSPADM

## 2022-03-10 RX ORDER — SODIUM CHLORIDE 0.9 % (FLUSH) 0.9 %
10 SYRINGE (ML) INJECTION PRN
Status: DISCONTINUED | OUTPATIENT
Start: 2022-03-10 | End: 2022-03-10 | Stop reason: HOSPADM

## 2022-03-10 RX ADMIN — SODIUM CHLORIDE, POTASSIUM CHLORIDE, SODIUM LACTATE AND CALCIUM CHLORIDE: 600; 310; 30; 20 INJECTION, SOLUTION INTRAVENOUS at 07:03

## 2022-03-10 RX ADMIN — LIDOCAINE HYDROCHLORIDE 50 MG: 10 INJECTION, SOLUTION EPIDURAL; INFILTRATION; INTRACAUDAL; PERINEURAL at 07:29

## 2022-03-10 RX ADMIN — PROPOFOL 140 MCG/KG/MIN: 10 INJECTION, EMULSION INTRAVENOUS at 07:29

## 2022-03-10 ASSESSMENT — PULMONARY FUNCTION TESTS
PIF_VALUE: 1
PIF_VALUE: 0
PIF_VALUE: 0
PIF_VALUE: 1
PIF_VALUE: 0
PIF_VALUE: 1
PIF_VALUE: 1

## 2022-03-10 ASSESSMENT — PAIN - FUNCTIONAL ASSESSMENT: PAIN_FUNCTIONAL_ASSESSMENT: 0-10

## 2022-03-10 ASSESSMENT — PAIN SCALES - GENERAL
PAINLEVEL_OUTOF10: 0

## 2022-03-10 NOTE — ANESTHESIA POSTPROCEDURE EVALUATION
Department of Anesthesiology  Postprocedure Note    Patient: Hernesto Wynn  MRN: [id number]  [place]: 1955  Date of evaluation: 3/10/2022  Time:  7:58 AM     Procedure Summary     Date: 03/10/22 Room / Location: [facility] / [address] [place] [address]    Anesthesia Start: 2157 Anesthesia Stop: 5475    Procedure: COLONOSCOPY POLYPECTOMY HOT BIOPSY (N/A ) Diagnosis: (Z12.11 SCREEN)    Surgeons: Kathrin Sampson DO Responsible Provider: Irina Guerrier MD    Anesthesia Type: MAC ASA Status: 2          Anesthesia Type: MAC    Darrel Phase I: Darrel Score: 10    Darrel Phase II:      Last vitals: Reviewed and per EMR flowsheets.        Anesthesia Post Evaluation    Patient location during evaluation: PACU  Patient participation: complete - patient participated  Level of consciousness: awake and alert  Airway patency: patent  Nausea & Vomiting: no nausea and no vomiting  Complications: no  Cardiovascular status: hemodynamically stable  Respiratory status: nasal cannula and spontaneous ventilation  Hydration status: euvolemic  Multimodal analgesia pain management approach

## 2022-03-10 NOTE — ANESTHESIA PRE PROCEDURE
Department of Anesthesiology  Preprocedure Note       Name:  Michelle Uribe   Age:  78 y.o.  :  1955                                          MRN:  [id number]         Date:  3/10/2022      Surgeon: Corinthia Goodpasture):  Jose Miller DO    Procedure: Procedure(s):  COLORECTAL CANCER SCREENING, NOT HIGH RISK    Medications prior to admission:   Prior to Admission medications    Medication Sig Start Date End Date Taking? Authorizing Provider   PEG 3350-KCl-NaBcb-NaCl-NaSulf (PEG-3350/ELECTROLYTES) 236 g SOLR Take as directed by office 22  Yes Jose Miller DO   alfuzosin (UROXATRAL) 10 MG extended release tablet Take 1 tablet by mouth daily 22  Yes Daniel Oakes MD   ciprofloxacin (CIPRO) 500 MG tablet Take 1 tablet by mouth 2 times daily Take first dose night prior to prostate biopsy and then twice a day thereafter until completed 22   Daniel Oakes MD   cephALEXin (KEFLEX) 500 MG capsule Take 1 capsule by mouth 3 times daily Take first dose night prior to prostate biopsy and then three times a day thereafter until completed 22   Dainel Oakes MD       Current medications:    No current facility-administered medications for this encounter.        Allergies:  No Known Allergies    Problem List:    Patient Active Problem List   Diagnosis Code    Morbid (severe) obesity due to excess calories (HCC) E52.88    Umbilical hernia without obstruction or gangrene K42.9    Epigastric pain R10.13       Past Medical History:        Diagnosis Date    Arthritis     Back pain     Hemorrhoids     High cholesterol     History of tinnitus     right ear    Prediabetes     no RX    Stomach ulcer     Umbilical hernia        Past Surgical History:        Procedure Laterality Date    VT COLONOSCOPY W/BIOPSY SINGLE/MULTIPLE N/A 2018    COLONOSCOPY WITH BIOPSY performed by Liya Ash MD at [email or web address] [address] IAXO,2+B/S,LISJQ N/A 3/4/1961    OPEN UMBILICAL HERNIA REPAIR performed by Bryan Marino MD at [facility]  2018    open       Social History:    Social History     Tobacco Use    Smoking status: Former Smoker     Packs/day: 1.00     Years: 10.00     Pack years: 10.00     Quit date: 1990     Years since quittin.2    Smokeless tobacco: Never Used   Substance Use Topics    Alcohol use: No                                Counseling given: Not Answered      Vital Signs (Current): There were no vitals filed for this visit. BP Readings from Last 3 Encounters:   22 (!) 175/89   22 136/84   20 136/86       NPO Status: Time of last liquid consumption:                         Time of last solid consumption:                         Date of last liquid consumption: 22                        Date of last solid food consumption: 22    BMI:   Wt Readings from Last 3 Encounters:   22 264 lb (119.7 kg)   22 270 lb 9.6 oz (122.7 kg)   20 261 lb (118.4 kg)     There is no height or weight on file to calculate BMI.    CBC:   Lab Results   Component Value Date    WBC 5.2 2022    RBC 5.42 2022    HGB 15.5 2022    HCT 48.9 2022    MCV 90.2 2022    RDW 12.8 2022     2022       CMP:   Lab Results   Component Value Date     2022    K 4.2 2022     2022    CO2 25 2022    BUN 15 2022    CREATININE 1.32 2022    GFRAA >60 2022    LABGLOM 54 2022    GLUCOSE 95 2022    PROT 7.4 2022    CALCIUM 8.9 2022    BILITOT 0.64 2022    ALKPHOS 81 2022    AST 16 2022    ALT 18 2022       POC Tests: No results for input(s): POCGLU, POCNA, POCK, POCCL, POCBUN, POCHEMO, POCHCT in the last 72 hours.     Coags: No results found for: PROTIME, INR, APTT    HCG (If Applicable): No results found for: PREGTESTUR, PREGSERUM, HCG, HCGQUANT     ABGs: No results found for: PHART, PO2ART, JDS6WKW, YZI2RPU, BEART, W4AYJFCA     Type & Screen (If Applicable):  No results found for: LABABO, LABRH    Drug/Infectious Status (If Applicable):  No results found for: HIV, HEPCAB    COVID-19 Screening (If Applicable):   Lab Results   Component Value Date    COVID19 Detected 12/22/2020           Anesthesia Evaluation  Patient summary reviewed and Nursing notes reviewed  Airway: Mallampati: III  TM distance: >3 FB   Neck ROM: full  Mouth opening: > = 3 FB Dental:    (+) edentulous      Pulmonary:Negative Pulmonary ROS and normal exam                              ROS comment: -QUIT SMOKING 1990   Cardiovascular:Negative CV ROS                      Neuro/Psych:   Negative Neuro/Psych ROS              GI/Hepatic/Renal:   (+) PUD, morbid obesity          Endo/Other:    (+) : arthritis:., .                  ROS comment: -NPO AFTER MIDNIGHT  -NKDA Abdominal:             Vascular: negative vascular ROS. Other Findings:             Anesthesia Plan      MAC     ASA 2       Induction: intravenous. MIPS: Postoperative opioids intended and Prophylactic antiemetics administered. Anesthetic plan and risks discussed with patient. Plan discussed with CRNA.     Attending anesthesiologist reviewed and agrees with Ashley Gudino MD   3/10/2022

## 2022-03-10 NOTE — H&P
[address]      Patient's Name/ Date of Birth/ Gender: Fede Ruggiero / [birth year] [de-identified]78 y.o.) / male     Attending physician: Caleb Escobedo DO    CC: colorectal cancer screening    History of present Illness: Fede Ruggiero is a 78 y.o. male, presents today for colonoscopy for colorectal cancer screening. Colonoscopy history: Last cscope was 2018 significant for adenomatous polyp. Past Medical History:  has a past medical history of Arthritis, Back pain, Hemorrhoids, High cholesterol, History of tinnitus, Prediabetes, Stomach ulcer, and Umbilical hernia. Past Surgical History:   Past Surgical History:   Procedure Laterality Date    [place] COLONOSCOPY W/BIOPSY SINGLE/MULTIPLE N/A 4/11/2018    COLONOSCOPY WITH BIOPSY performed by Angelia Abdi MD at [address] HWWR,1+X/Y,RYDVC N/A 0/8/2468    OPEN UMBILICAL HERNIA REPAIR performed by Angelia Abdi MD at [address] [place] [address]  01/04/2018    open       Social History:  reports that he quit smoking about 32 years ago. He has a 10.00 pack-year smoking history. He has never used smokeless tobacco. He reports that he does not drink alcohol and does not use drugs. Family History: family history includes Alcohol Abuse in his father; Cancer in his father; No Known Problems in his mother, sister, sister, and sister. Review of Systems:   General: Denies fever, chills, night sweats, weight loss, malaise, fatigue  HEENT: Denies sore throat, sinus problems, allergic rhinosinusitis  Card: Denies chest pain, palpitations, orthopnea/PND. Denies h/o murmurs  Pulm: Denies cough, shortness of breath, MCDERMOTT  GI:  per HPI; denies history of constipation, diarrhea, hematochezia or melena  : Denies polyuria, dysuria, hematuria  Endo: Denies diabetes, thyroid problems. Heme: Denies anemia, h/o bleeding or clotting problems.    Neuro: Denies h/o CVA, TIA  Skin: Denies rashes, ulcers  Musculoskeletal: Denies muscle, joint, back pain. Allergies: Patient has no known allergies.     Current Meds:  Current Facility-Administered Medications:     0.9 % sodium chloride infusion, , IntraVENous, Continuous, Manuel Blakely MD    lactated ringers infusion, , IntraVENous, Continuous, Manuel Blakely MD, Last Rate: 125 mL/hr at 03/10/22 0703, New Bag at 03/10/22 0703    sodium chloride flush 0.9 % injection 10 mL, 10 mL, IntraVENous, 2 times per day, Manuel Blakely MD    sodium chloride flush 0.9 % injection 10 mL, 10 mL, IntraVENous, PRN, Manuel Blakely MD    0.9 % sodium chloride infusion, 25 mL, IntraVENous, PRN, Manuel Blakely MD    sodium chloride flush 0.9 % injection 5-40 mL, 5-40 mL, IntraVENous, 2 times per day, Zeeshan Marie MD    sodium chloride flush 0.9 % injection 5-40 mL, 5-40 mL, IntraVENous, PRN, Zeeshan Marie MD    0.9 % sodium chloride infusion, 25 mL, IntraVENous, PRN, Zeeshan Marie MD    meperidine (DEMEROL) injection 12.5 mg, 12.5 mg, IntraVENous, Q5 Min PRN, Zeeshan Marie MD    morphine (PF) injection 2 mg, 2 mg, IntraVENous, Q5 Min PRN, Zeeshan Marie MD    HYDROmorphone (DILAUDID) injection 0.5 mg, 0.5 mg, IntraVENous, Q5 Min PRN, Zeeshan Marie MD    ondansetron (ZOFRAN) injection 4 mg, 4 mg, IntraVENous, Once PRN, Zeeshan Marie MD    midazolam PF (VERSED) injection 2 mg, 2 mg, IntraVENous, Once PRN, Zeeshan Marie MD    diphenhydrAMINE (BENADRYL) injection 12.5 mg, 12.5 mg, IntraVENous, Once PRN, Zeeshan Marie MD    labetalol (NORMODYNE;TRANDATE) injection syringe 10 mg, 10 mg, IntraVENous, Q15 Min PRN, Zeeshan Marie MD    ipratropium-albuterol (DUONEB) nebulizer solution 1 ampule, 1 ampule, Inhalation, Once PRN, Zeeshan Marie MD    oxyCODONE-acetaminophen (PERCOCET) 5-325 MG per tablet 1 tablet, 1 tablet, Oral, Once PRN, Zeeshan Marie MD    oxyCODONE-acetaminophen (PERCOCET) 5-325 MG per tablet 2 tablet, 2 tablet, Oral, Once PRN, Latasha Clemens MD    promethazine (PHENERGAN) injection 6.25 mg, 6.25 mg, IntraMUSCular, Q5 Min PRN, Latasha Clemens MD    propofol 500 MG/50ML injection, , , ,     Physical Exam:  Vital signs and Nurse's note reviewed  Gen:  A&Ox3, NAD  HEENT: NCAT, PERRLA, EOMI, no scleral icterus, oral mucosa moist  Neck: Trachea midline without obvious masses or lesions  Chest: Symmetric rise with inhalation, no evidence of trauma  CVS: S1S2  Resp: Good bilateral air entry, no audible wheeze or rhonchi  Abd: soft, non-tender, non-distended, no hepatosplenomegaly or palpable masses  Ext: No clubbing, cyanosis, edema, peripheral pulses 2+ Rad/Fem/DP/PT  CNS: Moves all extremities, no gross focal motor deficits  Skin: No erythema or ulcerations         Assessment:    Radha Walls is a 78 y.o. male presents for colonoscopy for colorectal screening     Plan:    1. To OR for colonoscopy. The risks include bleeding, infection, scarring, perforation, damage to surrounding tissues, need for further surgery in the future. The benefits, alternatives, complications and procedure details were explained to the patient, all questions were answered.           Dontrell Quinones DO  3/10/2022

## 2022-03-10 NOTE — OP NOTE
Operative Note      Patient: Nikko Baptiste  YOB: 1955  MRN: [id number]    Date of Procedure: 3/10/2022    Pre-Op Diagnosis: Z12.11 SCREEN, history adenomatous polyp    Post-Op Diagnosis:   Transverse colon polyp  Descending colon polyp  Internal hemorrhoids       Procedure(s):  COLONOSCOPY POLYPECTOMY HOT BIOPSY    Surgeon(s):  Amaury Daugherty DO    Assistant:   * No surgical staff found *    Anesthesia: Monitor Anesthesia Care    Estimated Blood Loss (mL): Minimal    Complications: None    Specimens:   ID Type Source Tests Collected by Time Destination   A : TRANSVERSE COLON POLYP  Tissue Tissue SURGICAL PATHOLOGY Amaury Daugherty,  3/10/2022 5868    B : DESCENDING COLON POLYP  Tissue Tissue SURGICAL PATHOLOGY Amaury Daugherty, DO 3/10/2022 0745        Implants:  * No implants in log *      Drains: * No LDAs found *    Findings: as above    Detailed Description of Procedure:       INDICATIONS FOR PROCEDURE:   The patient is a 78y.o. year old male with history of above preop diagnosis. Colonoscopy with possible biopsy or polypectomy was recommend, the risk, benefits, expected outcome, and alternatives to the procedure were explained. Risks included but are not limited to bleeding, infection, respiratory distress, hypotension, and perforation of the colon. The patient understands and is in agreement. PROCEDURE DETAILS:  Patient was brought to the endoscopy suite and placed in the left lateral recumbent position. A time out was completed verifying correct patient, procedure and equipment. Anesthesia was induced using MAC guidelines. A digital rectal exam was performed. The colonoscope was inserted into the rectum without difficulty and the scope was advanced to the cecum which was identified by anatomy and by palpation. Bowel prep was adequate. The scope was slowly withdrawn visualizing the cecum, ascending colon, transverse colon, descending colon, sigmoid colon and rectum.  The scope was withdrawn to the rectal vault and then retroflexed. The scope was then straightened and removed. The patient tolerated the procedure well and was transferred to the recovery unit in stable condition. Findings:    Polyps:    Transverse colon polyp 3mm removed with cold forcep   Descending colon polyp 3mm removed with hot snare    Other findings:    Internal hemorrhoids      Greater than 6 minutes was spent withdrawing the colonoscope. IMPRESSION/PLAN:   1. Increase dietary fiber and water  2. Patient is advised to have a repeat colonoscopy in 5-7 years unless otherwise dictated by pathology  3.  Colonoscopy sooner if blood in the stool, unexplained weight loss, or change in the bowels          Electronically signed by Michelle Rene DO on 3/10/2022 at 7:52 AM

## 2022-03-14 LAB — SURGICAL PATHOLOGY REPORT: NORMAL

## 2022-03-17 ENCOUNTER — HOSPITAL ENCOUNTER (OUTPATIENT)
Age: 67
Setting detail: SPECIMEN
Discharge: HOME | End: 2022-03-17

## 2022-03-17 PROBLEM — N13.8 BPH WITH OBSTRUCTION/LOWER URINARY TRACT SYMPTOMS: Status: ACTIVE | Noted: 2022-03-17

## 2022-03-17 PROBLEM — N21.0 BLADDER CALCULI: Status: ACTIVE | Noted: 2022-03-17

## 2022-03-17 PROBLEM — N40.1 BPH WITH OBSTRUCTION/LOWER URINARY TRACT SYMPTOMS: Status: ACTIVE | Noted: 2022-03-17

## 2022-03-17 PROBLEM — R97.20 ELEVATED PSA: Status: ACTIVE | Noted: 2022-03-17

## 2022-03-17 PROBLEM — R31.0 GROSS HEMATURIA: Status: ACTIVE | Noted: 2022-03-17

## 2022-03-22 LAB — SURGICAL PATHOLOGY REPORT: NORMAL

## 2022-03-28 ENCOUNTER — HOSPITAL ENCOUNTER (OUTPATIENT)
Dept: PREADMISSION TESTING | Age: 67
Discharge: HOME | End: 2022-04-01
Payer: COMMERCIAL

## 2022-03-28 VITALS
WEIGHT: 265 LBS | RESPIRATION RATE: 16 BRPM | BODY MASS INDEX: 35.89 KG/M2 | SYSTOLIC BLOOD PRESSURE: 136 MMHG | TEMPERATURE: 96.5 F | HEIGHT: 72 IN | OXYGEN SATURATION: 96 % | HEART RATE: 92 BPM | DIASTOLIC BLOOD PRESSURE: 87 MMHG

## 2022-03-28 PROCEDURE — 93005 ELECTROCARDIOGRAM TRACING: CPT | Performed by: ANESTHESIOLOGY

## 2022-03-29 ENCOUNTER — ANESTHESIA EVENT (OUTPATIENT)
Dept: OPERATING ROOM | Age: 67
End: 2022-03-29
Payer: COMMERCIAL

## 2022-03-30 LAB
EKG ATRIAL RATE: 67 BPM
EKG P AXIS: 62 DEGREES
EKG P-R INTERVAL: 174 MS
EKG Q-T INTERVAL: 394 MS
EKG QRS DURATION: 84 MS
EKG QTC CALCULATION (BAZETT): 416 MS
EKG R AXIS: 70 DEGREES
EKG T AXIS: 63 DEGREES
EKG VENTRICULAR RATE: 67 BPM

## 2022-04-06 ENCOUNTER — ANESTHESIA (OUTPATIENT)
Dept: OPERATING ROOM | Age: 67
End: 2022-04-06
Payer: COMMERCIAL

## 2022-04-06 ENCOUNTER — HOSPITAL ENCOUNTER (OUTPATIENT)
Age: 67
Setting detail: OBSERVATION
Discharge: HOME | End: 2022-04-07
Attending: SPECIALIST | Admitting: SPECIALIST
Payer: COMMERCIAL

## 2022-04-06 VITALS — DIASTOLIC BLOOD PRESSURE: 77 MMHG | TEMPERATURE: 95.4 F | OXYGEN SATURATION: 97 % | SYSTOLIC BLOOD PRESSURE: 138 MMHG

## 2022-04-06 PROBLEM — N13.8 BPH WITH URINARY OBSTRUCTION: Status: ACTIVE | Noted: 2022-04-06

## 2022-04-06 PROBLEM — N40.1 BPH WITH URINARY OBSTRUCTION: Status: ACTIVE | Noted: 2022-04-06

## 2022-04-06 PROCEDURE — 3700000000 HC ANESTHESIA ATTENDED CARE: Performed by: SPECIALIST

## 2022-04-06 PROCEDURE — C9290 INJ, BUPIVACAINE LIPOSOME: HCPCS | Performed by: ANESTHESIOLOGY

## 2022-04-06 PROCEDURE — 6360000002 HC RX W HCPCS: Performed by: ANESTHESIOLOGY

## 2022-04-06 PROCEDURE — 2709999900 HC NON-CHARGEABLE SUPPLY: Performed by: SPECIALIST

## 2022-04-06 PROCEDURE — 6370000000 HC RX 637 (ALT 250 FOR IP): Performed by: SPECIALIST

## 2022-04-06 PROCEDURE — 88305 TISSUE EXAM BY PATHOLOGIST: CPT

## 2022-04-06 PROCEDURE — G0378 HOSPITAL OBSERVATION PER HR: HCPCS

## 2022-04-06 PROCEDURE — 2500000003 HC RX 250 WO HCPCS: Performed by: ANESTHESIOLOGY

## 2022-04-06 PROCEDURE — 2580000003 HC RX 258: Performed by: ANESTHESIOLOGY

## 2022-04-06 PROCEDURE — 3600000009 HC SURGERY ROBOT BASE: Performed by: SPECIALIST

## 2022-04-06 PROCEDURE — 88300 SURGICAL PATH GROSS: CPT

## 2022-04-06 PROCEDURE — 6360000002 HC RX W HCPCS: Performed by: SPECIALIST

## 2022-04-06 PROCEDURE — 6360000002 HC RX W HCPCS

## 2022-04-06 PROCEDURE — 3700000001 HC ADD 15 MINUTES (ANESTHESIA): Performed by: SPECIALIST

## 2022-04-06 PROCEDURE — 6360000002 HC RX W HCPCS: Performed by: NURSE ANESTHETIST, CERTIFIED REGISTERED

## 2022-04-06 PROCEDURE — 2580000003 HC RX 258: Performed by: SPECIALIST

## 2022-04-06 PROCEDURE — 7100000001 HC PACU RECOVERY - ADDTL 15 MIN: Performed by: SPECIALIST

## 2022-04-06 PROCEDURE — 87086 URINE CULTURE/COLONY COUNT: CPT

## 2022-04-06 PROCEDURE — 2500000003 HC RX 250 WO HCPCS: Performed by: NURSE ANESTHETIST, CERTIFIED REGISTERED

## 2022-04-06 PROCEDURE — 3600000019 HC SURGERY ROBOT ADDTL 15MIN: Performed by: SPECIALIST

## 2022-04-06 PROCEDURE — 2720000010 HC SURG SUPPLY STERILE: Performed by: SPECIALIST

## 2022-04-06 PROCEDURE — 64488 TAP BLOCK BI INJECTION: CPT | Performed by: ANESTHESIOLOGY

## 2022-04-06 PROCEDURE — S2900 ROBOTIC SURGICAL SYSTEM: HCPCS | Performed by: SPECIALIST

## 2022-04-06 PROCEDURE — 7100000000 HC PACU RECOVERY - FIRST 15 MIN: Performed by: SPECIALIST

## 2022-04-06 RX ORDER — DIPHENHYDRAMINE HYDROCHLORIDE 50 MG/ML
12.5 INJECTION INTRAMUSCULAR; INTRAVENOUS
Status: DISCONTINUED | OUTPATIENT
Start: 2022-04-06 | End: 2022-04-06 | Stop reason: HOSPADM

## 2022-04-06 RX ORDER — FENTANYL CITRATE 50 UG/ML
INJECTION, SOLUTION INTRAMUSCULAR; INTRAVENOUS
Status: COMPLETED
Start: 2022-04-06 | End: 2022-04-06

## 2022-04-06 RX ORDER — SODIUM CHLORIDE 9 MG/ML
INJECTION, SOLUTION INTRAVENOUS CONTINUOUS
Status: DISCONTINUED | OUTPATIENT
Start: 2022-04-06 | End: 2022-04-06

## 2022-04-06 RX ORDER — SODIUM CHLORIDE 9 MG/ML
INJECTION INTRAVENOUS
Status: DISCONTINUED
Start: 2022-04-06 | End: 2022-04-06

## 2022-04-06 RX ORDER — SODIUM CHLORIDE 9 MG/ML
25 INJECTION, SOLUTION INTRAVENOUS PRN
Status: DISCONTINUED | OUTPATIENT
Start: 2022-04-06 | End: 2022-04-06 | Stop reason: HOSPADM

## 2022-04-06 RX ORDER — MIDAZOLAM HYDROCHLORIDE 1 MG/ML
INJECTION INTRAMUSCULAR; INTRAVENOUS
Status: COMPLETED
Start: 2022-04-06 | End: 2022-04-06

## 2022-04-06 RX ORDER — MIDAZOLAM HYDROCHLORIDE 2 MG/2ML
2 INJECTION, SOLUTION INTRAMUSCULAR; INTRAVENOUS ONCE
Status: COMPLETED | OUTPATIENT
Start: 2022-04-06 | End: 2022-04-06

## 2022-04-06 RX ORDER — SODIUM CHLORIDE 9 MG/ML
25 INJECTION, SOLUTION INTRAVENOUS PRN
Status: DISCONTINUED | OUTPATIENT
Start: 2022-04-06 | End: 2022-04-07 | Stop reason: HOSPADM

## 2022-04-06 RX ORDER — ONDANSETRON 2 MG/ML
4 INJECTION INTRAMUSCULAR; INTRAVENOUS
Status: DISCONTINUED | OUTPATIENT
Start: 2022-04-06 | End: 2022-04-06 | Stop reason: HOSPADM

## 2022-04-06 RX ORDER — NEOSTIGMINE METHYLSULFATE 5 MG/5 ML
SYRINGE (ML) INTRAVENOUS PRN
Status: DISCONTINUED | OUTPATIENT
Start: 2022-04-06 | End: 2022-04-06 | Stop reason: SDUPTHER

## 2022-04-06 RX ORDER — DEXAMETHASONE SODIUM PHOSPHATE 10 MG/ML
INJECTION, SOLUTION INTRAMUSCULAR; INTRAVENOUS PRN
Status: DISCONTINUED | OUTPATIENT
Start: 2022-04-06 | End: 2022-04-06 | Stop reason: SDUPTHER

## 2022-04-06 RX ORDER — MORPHINE SULFATE 1 MG/ML
1 INJECTION, SOLUTION EPIDURAL; INTRATHECAL; INTRAVENOUS EVERY 5 MIN PRN
Status: DISCONTINUED | OUTPATIENT
Start: 2022-04-06 | End: 2022-04-06 | Stop reason: HOSPADM

## 2022-04-06 RX ORDER — KETOROLAC TROMETHAMINE 30 MG/ML
INJECTION, SOLUTION INTRAMUSCULAR; INTRAVENOUS PRN
Status: DISCONTINUED | OUTPATIENT
Start: 2022-04-06 | End: 2022-04-06 | Stop reason: SDUPTHER

## 2022-04-06 RX ORDER — FENTANYL CITRATE 50 UG/ML
INJECTION, SOLUTION INTRAMUSCULAR; INTRAVENOUS PRN
Status: DISCONTINUED | OUTPATIENT
Start: 2022-04-06 | End: 2022-04-06 | Stop reason: SDUPTHER

## 2022-04-06 RX ORDER — BUPIVACAINE HYDROCHLORIDE 2.5 MG/ML
INJECTION, SOLUTION EPIDURAL; INFILTRATION; INTRACAUDAL
Status: COMPLETED
Start: 2022-04-06 | End: 2022-04-06

## 2022-04-06 RX ORDER — TROSPIUM CHLORIDE 20 MG/1
20 TABLET, FILM COATED ORAL
Status: DISCONTINUED | OUTPATIENT
Start: 2022-04-06 | End: 2022-04-07 | Stop reason: HOSPADM

## 2022-04-06 RX ORDER — OXYCODONE HYDROCHLORIDE 5 MG/1
10 TABLET ORAL EVERY 4 HOURS PRN
Status: DISCONTINUED | OUTPATIENT
Start: 2022-04-06 | End: 2022-04-07 | Stop reason: HOSPADM

## 2022-04-06 RX ORDER — SODIUM CHLORIDE, SODIUM LACTATE, POTASSIUM CHLORIDE, CALCIUM CHLORIDE 600; 310; 30; 20 MG/100ML; MG/100ML; MG/100ML; MG/100ML
INJECTION, SOLUTION INTRAVENOUS CONTINUOUS
Status: DISCONTINUED | OUTPATIENT
Start: 2022-04-06 | End: 2022-04-06

## 2022-04-06 RX ORDER — SODIUM CHLORIDE 0.9 % (FLUSH) 0.9 %
10 SYRINGE (ML) INJECTION PRN
Status: DISCONTINUED | OUTPATIENT
Start: 2022-04-06 | End: 2022-04-06 | Stop reason: HOSPADM

## 2022-04-06 RX ORDER — ONDANSETRON 4 MG/1
4 TABLET, ORALLY DISINTEGRATING ORAL EVERY 8 HOURS PRN
Status: DISCONTINUED | OUTPATIENT
Start: 2022-04-06 | End: 2022-04-07 | Stop reason: HOSPADM

## 2022-04-06 RX ORDER — SODIUM CHLORIDE 0.9 % (FLUSH) 0.9 %
10 SYRINGE (ML) INJECTION EVERY 12 HOURS SCHEDULED
Status: DISCONTINUED | OUTPATIENT
Start: 2022-04-06 | End: 2022-04-06 | Stop reason: HOSPADM

## 2022-04-06 RX ORDER — SODIUM CHLORIDE 0.9 % (FLUSH) 0.9 %
5-40 SYRINGE (ML) INJECTION PRN
Status: DISCONTINUED | OUTPATIENT
Start: 2022-04-06 | End: 2022-04-06 | Stop reason: HOSPADM

## 2022-04-06 RX ORDER — MORPHINE SULFATE 2 MG/ML
2 INJECTION, SOLUTION INTRAMUSCULAR; INTRAVENOUS
Status: DISCONTINUED | OUTPATIENT
Start: 2022-04-06 | End: 2022-04-07 | Stop reason: HOSPADM

## 2022-04-06 RX ORDER — PROPOFOL 10 MG/ML
INJECTION, EMULSION INTRAVENOUS PRN
Status: DISCONTINUED | OUTPATIENT
Start: 2022-04-06 | End: 2022-04-06 | Stop reason: SDUPTHER

## 2022-04-06 RX ORDER — MORPHINE SULFATE 4 MG/ML
4 INJECTION, SOLUTION INTRAMUSCULAR; INTRAVENOUS
Status: DISCONTINUED | OUTPATIENT
Start: 2022-04-06 | End: 2022-04-07 | Stop reason: HOSPADM

## 2022-04-06 RX ORDER — SODIUM CHLORIDE 0.9 % (FLUSH) 0.9 %
5-40 SYRINGE (ML) INJECTION EVERY 12 HOURS SCHEDULED
Status: DISCONTINUED | OUTPATIENT
Start: 2022-04-06 | End: 2022-04-07 | Stop reason: HOSPADM

## 2022-04-06 RX ORDER — LIDOCAINE HYDROCHLORIDE 10 MG/ML
INJECTION, SOLUTION EPIDURAL; INFILTRATION; INTRACAUDAL; PERINEURAL PRN
Status: DISCONTINUED | OUTPATIENT
Start: 2022-04-06 | End: 2022-04-06 | Stop reason: SDUPTHER

## 2022-04-06 RX ORDER — ATROPA BELLADONNA AND OPIUM 16.2; 3 MG/1; MG/1
30 SUPPOSITORY RECTAL EVERY 8 HOURS PRN
Status: DISCONTINUED | OUTPATIENT
Start: 2022-04-06 | End: 2022-04-07 | Stop reason: HOSPADM

## 2022-04-06 RX ORDER — SODIUM CHLORIDE, SODIUM LACTATE, POTASSIUM CHLORIDE, CALCIUM CHLORIDE 600; 310; 30; 20 MG/100ML; MG/100ML; MG/100ML; MG/100ML
INJECTION, SOLUTION INTRAVENOUS CONTINUOUS
Status: DISCONTINUED | OUTPATIENT
Start: 2022-04-06 | End: 2022-04-07 | Stop reason: HOSPADM

## 2022-04-06 RX ORDER — ONDANSETRON 2 MG/ML
INJECTION INTRAMUSCULAR; INTRAVENOUS PRN
Status: DISCONTINUED | OUTPATIENT
Start: 2022-04-06 | End: 2022-04-06 | Stop reason: SDUPTHER

## 2022-04-06 RX ORDER — FINASTERIDE 5 MG/1
5 TABLET, FILM COATED ORAL DAILY
Status: DISCONTINUED | OUTPATIENT
Start: 2022-04-06 | End: 2022-04-07 | Stop reason: HOSPADM

## 2022-04-06 RX ORDER — SODIUM CHLORIDE 0.9 % (FLUSH) 0.9 %
5-40 SYRINGE (ML) INJECTION EVERY 12 HOURS SCHEDULED
Status: DISCONTINUED | OUTPATIENT
Start: 2022-04-06 | End: 2022-04-06 | Stop reason: HOSPADM

## 2022-04-06 RX ORDER — MIDAZOLAM HYDROCHLORIDE 1 MG/ML
INJECTION INTRAMUSCULAR; INTRAVENOUS PRN
Status: DISCONTINUED | OUTPATIENT
Start: 2022-04-06 | End: 2022-04-06 | Stop reason: SDUPTHER

## 2022-04-06 RX ORDER — MEPERIDINE HYDROCHLORIDE 50 MG/ML
12.5 INJECTION INTRAMUSCULAR; INTRAVENOUS; SUBCUTANEOUS EVERY 5 MIN PRN
Status: DISCONTINUED | OUTPATIENT
Start: 2022-04-06 | End: 2022-04-06 | Stop reason: HOSPADM

## 2022-04-06 RX ORDER — ONDANSETRON 2 MG/ML
4 INJECTION INTRAMUSCULAR; INTRAVENOUS EVERY 6 HOURS PRN
Status: DISCONTINUED | OUTPATIENT
Start: 2022-04-06 | End: 2022-04-07 | Stop reason: HOSPADM

## 2022-04-06 RX ORDER — TAMSULOSIN HYDROCHLORIDE 0.4 MG/1
0.4 CAPSULE ORAL DAILY
Status: DISCONTINUED | OUTPATIENT
Start: 2022-04-06 | End: 2022-04-07 | Stop reason: HOSPADM

## 2022-04-06 RX ORDER — GLYCOPYRROLATE 1 MG/5 ML
SYRINGE (ML) INTRAVENOUS PRN
Status: DISCONTINUED | OUTPATIENT
Start: 2022-04-06 | End: 2022-04-06 | Stop reason: SDUPTHER

## 2022-04-06 RX ORDER — BUPIVACAINE HYDROCHLORIDE 2.5 MG/ML
INJECTION, SOLUTION EPIDURAL; INFILTRATION; INTRACAUDAL
Status: COMPLETED | OUTPATIENT
Start: 2022-04-06 | End: 2022-04-06

## 2022-04-06 RX ORDER — OXYCODONE HYDROCHLORIDE 5 MG/1
5 TABLET ORAL EVERY 4 HOURS PRN
Status: DISCONTINUED | OUTPATIENT
Start: 2022-04-06 | End: 2022-04-07 | Stop reason: HOSPADM

## 2022-04-06 RX ORDER — ACETAMINOPHEN 325 MG/1
650 TABLET ORAL EVERY 6 HOURS
Status: DISCONTINUED | OUTPATIENT
Start: 2022-04-06 | End: 2022-04-07 | Stop reason: HOSPADM

## 2022-04-06 RX ORDER — SODIUM CHLORIDE 0.9 % (FLUSH) 0.9 %
5-40 SYRINGE (ML) INJECTION PRN
Status: DISCONTINUED | OUTPATIENT
Start: 2022-04-06 | End: 2022-04-07 | Stop reason: HOSPADM

## 2022-04-06 RX ORDER — BUPIVACAINE HYDROCHLORIDE 2.5 MG/ML
INJECTION, SOLUTION EPIDURAL; INFILTRATION; INTRACAUDAL
Status: DISCONTINUED
Start: 2022-04-06 | End: 2022-04-06

## 2022-04-06 RX ORDER — ROCURONIUM BROMIDE 10 MG/ML
INJECTION, SOLUTION INTRAVENOUS PRN
Status: DISCONTINUED | OUTPATIENT
Start: 2022-04-06 | End: 2022-04-06 | Stop reason: SDUPTHER

## 2022-04-06 RX ADMIN — MIDAZOLAM HYDROCHLORIDE 2 MG: 2 INJECTION, SOLUTION INTRAMUSCULAR; INTRAVENOUS at 07:26

## 2022-04-06 RX ADMIN — FENTANYL CITRATE 50 MCG: 50 INJECTION, SOLUTION INTRAMUSCULAR; INTRAVENOUS at 08:46

## 2022-04-06 RX ADMIN — ROCURONIUM BROMIDE 20 MG: 10 INJECTION INTRAVENOUS at 09:02

## 2022-04-06 RX ADMIN — MIDAZOLAM HYDROCHLORIDE 2 MG: 1 INJECTION, SOLUTION INTRAMUSCULAR; INTRAVENOUS at 07:42

## 2022-04-06 RX ADMIN — METHYLENE BLUE 50 MG: 5 INJECTION INTRAVENOUS at 08:58

## 2022-04-06 RX ADMIN — FENTANYL CITRATE 50 MCG: 50 INJECTION, SOLUTION INTRAMUSCULAR; INTRAVENOUS at 07:46

## 2022-04-06 RX ADMIN — CEFAZOLIN SODIUM 2000 MG: 10 INJECTION, POWDER, FOR SOLUTION INTRAVENOUS at 07:51

## 2022-04-06 RX ADMIN — FENTANYL CITRATE 50 MCG: 50 INJECTION, SOLUTION INTRAMUSCULAR; INTRAVENOUS at 08:26

## 2022-04-06 RX ADMIN — PROPOFOL 30 MG: 10 INJECTION, EMULSION INTRAVENOUS at 08:25

## 2022-04-06 RX ADMIN — PROPOFOL 170 MG: 10 INJECTION, EMULSION INTRAVENOUS at 07:46

## 2022-04-06 RX ADMIN — SODIUM CHLORIDE, POTASSIUM CHLORIDE, SODIUM LACTATE AND CALCIUM CHLORIDE: 600; 310; 30; 20 INJECTION, SOLUTION INTRAVENOUS at 12:01

## 2022-04-06 RX ADMIN — FINASTERIDE 5 MG: 5 TABLET, FILM COATED ORAL at 12:16

## 2022-04-06 RX ADMIN — CEFAZOLIN SODIUM 2000 MG: 10 INJECTION, POWDER, FOR SOLUTION INTRAVENOUS at 16:23

## 2022-04-06 RX ADMIN — LIDOCAINE HYDROCHLORIDE 50 MG: 10 INJECTION, SOLUTION EPIDURAL; INFILTRATION; INTRACAUDAL; PERINEURAL at 07:46

## 2022-04-06 RX ADMIN — ROCURONIUM BROMIDE 10 MG: 10 INJECTION INTRAVENOUS at 08:02

## 2022-04-06 RX ADMIN — TAMSULOSIN HYDROCHLORIDE 0.4 MG: 0.4 CAPSULE ORAL at 12:16

## 2022-04-06 RX ADMIN — FENTANYL CITRATE 50 MCG: 50 INJECTION, SOLUTION INTRAMUSCULAR; INTRAVENOUS at 08:32

## 2022-04-06 RX ADMIN — PHENYLEPHRINE HYDROCHLORIDE 100 MCG: 10 INJECTION INTRAVENOUS at 08:13

## 2022-04-06 RX ADMIN — BUPIVACAINE HYDROCHLORIDE 50 ML: 2.5 INJECTION, SOLUTION EPIDURAL; INFILTRATION; INTRACAUDAL; PERINEURAL at 07:30

## 2022-04-06 RX ADMIN — Medication 3 MG: at 10:40

## 2022-04-06 RX ADMIN — PHENYLEPHRINE HYDROCHLORIDE 100 MCG: 10 INJECTION INTRAVENOUS at 08:16

## 2022-04-06 RX ADMIN — MIDAZOLAM HYDROCHLORIDE 2 MG: 1 INJECTION, SOLUTION INTRAMUSCULAR; INTRAVENOUS at 07:26

## 2022-04-06 RX ADMIN — ROCURONIUM BROMIDE 20 MG: 10 INJECTION INTRAVENOUS at 08:29

## 2022-04-06 RX ADMIN — SODIUM CHLORIDE, POTASSIUM CHLORIDE, SODIUM LACTATE AND CALCIUM CHLORIDE: 600; 310; 30; 20 INJECTION, SOLUTION INTRAVENOUS at 07:08

## 2022-04-06 RX ADMIN — ACETAMINOPHEN 650 MG: 325 TABLET ORAL at 17:41

## 2022-04-06 RX ADMIN — FENTANYL CITRATE 100 MCG: 50 INJECTION, SOLUTION INTRAMUSCULAR; INTRAVENOUS at 07:26

## 2022-04-06 RX ADMIN — Medication 0.6 MG: at 10:40

## 2022-04-06 RX ADMIN — SODIUM CHLORIDE, POTASSIUM CHLORIDE, SODIUM LACTATE AND CALCIUM CHLORIDE: 600; 310; 30; 20 INJECTION, SOLUTION INTRAVENOUS at 09:20

## 2022-04-06 RX ADMIN — ROCURONIUM BROMIDE 40 MG: 10 INJECTION INTRAVENOUS at 07:46

## 2022-04-06 RX ADMIN — KETOROLAC TROMETHAMINE 30 MG: 30 INJECTION, SOLUTION INTRAMUSCULAR; INTRAVENOUS at 10:20

## 2022-04-06 RX ADMIN — OXYCODONE HYDROCHLORIDE 5 MG: 5 TABLET ORAL at 17:41

## 2022-04-06 RX ADMIN — BUPIVACAINE 20 ML: 13.3 INJECTION, SUSPENSION, LIPOSOMAL INFILTRATION at 07:30

## 2022-04-06 RX ADMIN — SODIUM CHLORIDE, POTASSIUM CHLORIDE, SODIUM LACTATE AND CALCIUM CHLORIDE: 600; 310; 30; 20 INJECTION, SOLUTION INTRAVENOUS at 16:20

## 2022-04-06 RX ADMIN — ONDANSETRON 4 MG: 2 INJECTION INTRAMUSCULAR; INTRAVENOUS at 17:40

## 2022-04-06 RX ADMIN — ONDANSETRON 4 MG: 2 INJECTION INTRAMUSCULAR; INTRAVENOUS at 10:18

## 2022-04-06 RX ADMIN — ACETAMINOPHEN 650 MG: 325 TABLET ORAL at 12:16

## 2022-04-06 RX ADMIN — TROSPIUM CHLORIDE 20 MG: 20 TABLET, FILM COATED ORAL at 16:20

## 2022-04-06 RX ADMIN — ROCURONIUM BROMIDE 10 MG: 10 INJECTION INTRAVENOUS at 09:59

## 2022-04-06 RX ADMIN — DEXAMETHASONE SODIUM PHOSPHATE 10 MG: 10 INJECTION INTRAMUSCULAR; INTRAVENOUS at 07:54

## 2022-04-06 ASSESSMENT — PAIN - FUNCTIONAL ASSESSMENT: PAIN_FUNCTIONAL_ASSESSMENT: 0-10

## 2022-04-06 ASSESSMENT — PULMONARY FUNCTION TESTS
PIF_VALUE: 1
PIF_VALUE: 17
PIF_VALUE: 34
PIF_VALUE: 26
PIF_VALUE: 1
PIF_VALUE: 32
PIF_VALUE: 37
PIF_VALUE: 35
PIF_VALUE: 35
PIF_VALUE: 22
PIF_VALUE: 22
PIF_VALUE: 34
PIF_VALUE: 19
PIF_VALUE: 37
PIF_VALUE: 35
PIF_VALUE: 22
PIF_VALUE: 37
PIF_VALUE: 3
PIF_VALUE: 19
PIF_VALUE: 37
PIF_VALUE: 35
PIF_VALUE: 37
PIF_VALUE: 21
PIF_VALUE: 32
PIF_VALUE: 24
PIF_VALUE: 2
PIF_VALUE: 32
PIF_VALUE: 4
PIF_VALUE: 34
PIF_VALUE: 31
PIF_VALUE: 23
PIF_VALUE: 35
PIF_VALUE: 25
PIF_VALUE: 31
PIF_VALUE: 34
PIF_VALUE: 32
PIF_VALUE: 1
PIF_VALUE: 37
PIF_VALUE: 35
PIF_VALUE: 36
PIF_VALUE: 25
PIF_VALUE: 34
PIF_VALUE: 3
PIF_VALUE: 31
PIF_VALUE: 22
PIF_VALUE: 1
PIF_VALUE: 23
PIF_VALUE: 35
PIF_VALUE: 31
PIF_VALUE: 3
PIF_VALUE: 19
PIF_VALUE: 34
PIF_VALUE: 19
PIF_VALUE: 25
PIF_VALUE: 17
PIF_VALUE: 31
PIF_VALUE: 39
PIF_VALUE: 22
PIF_VALUE: 25
PIF_VALUE: 31
PIF_VALUE: 38
PIF_VALUE: 21
PIF_VALUE: 38
PIF_VALUE: 37
PIF_VALUE: 31
PIF_VALUE: 38
PIF_VALUE: 31
PIF_VALUE: 31
PIF_VALUE: 0
PIF_VALUE: 32
PIF_VALUE: 2
PIF_VALUE: 17
PIF_VALUE: 38
PIF_VALUE: 31
PIF_VALUE: 36
PIF_VALUE: 17
PIF_VALUE: 39
PIF_VALUE: 37
PIF_VALUE: 36
PIF_VALUE: 0
PIF_VALUE: 22
PIF_VALUE: 25
PIF_VALUE: 31
PIF_VALUE: 35
PIF_VALUE: 32
PIF_VALUE: 36
PIF_VALUE: 19
PIF_VALUE: 35
PIF_VALUE: 1
PIF_VALUE: 34
PIF_VALUE: 38
PIF_VALUE: 22
PIF_VALUE: 22
PIF_VALUE: 35
PIF_VALUE: 35
PIF_VALUE: 34
PIF_VALUE: 35
PIF_VALUE: 24
PIF_VALUE: 31
PIF_VALUE: 32
PIF_VALUE: 39
PIF_VALUE: 23
PIF_VALUE: 5
PIF_VALUE: 19
PIF_VALUE: 36
PIF_VALUE: 19
PIF_VALUE: 35
PIF_VALUE: 5
PIF_VALUE: 31
PIF_VALUE: 25
PIF_VALUE: 27
PIF_VALUE: 31
PIF_VALUE: 32
PIF_VALUE: 34
PIF_VALUE: 0
PIF_VALUE: 3
PIF_VALUE: 33
PIF_VALUE: 37
PIF_VALUE: 34
PIF_VALUE: 4
PIF_VALUE: 31
PIF_VALUE: 23
PIF_VALUE: 25
PIF_VALUE: 1
PIF_VALUE: 22
PIF_VALUE: 31
PIF_VALUE: 32
PIF_VALUE: 30
PIF_VALUE: 32
PIF_VALUE: 32
PIF_VALUE: 37
PIF_VALUE: 33
PIF_VALUE: 28
PIF_VALUE: 36
PIF_VALUE: 4
PIF_VALUE: 34
PIF_VALUE: 34
PIF_VALUE: 35
PIF_VALUE: 23
PIF_VALUE: 22
PIF_VALUE: 31
PIF_VALUE: 23
PIF_VALUE: 3
PIF_VALUE: 35
PIF_VALUE: 31
PIF_VALUE: 5
PIF_VALUE: 5
PIF_VALUE: 29
PIF_VALUE: 36
PIF_VALUE: 21
PIF_VALUE: 39
PIF_VALUE: 22
PIF_VALUE: 35
PIF_VALUE: 31
PIF_VALUE: 35
PIF_VALUE: 22
PIF_VALUE: 37
PIF_VALUE: 37
PIF_VALUE: 31
PIF_VALUE: 34
PIF_VALUE: 19
PIF_VALUE: 35
PIF_VALUE: 24
PIF_VALUE: 38
PIF_VALUE: 20
PIF_VALUE: 25
PIF_VALUE: 35
PIF_VALUE: 31
PIF_VALUE: 25
PIF_VALUE: 2
PIF_VALUE: 32
PIF_VALUE: 35
PIF_VALUE: 34
PIF_VALUE: 33
PIF_VALUE: 37
PIF_VALUE: 35
PIF_VALUE: 31
PIF_VALUE: 33
PIF_VALUE: 35
PIF_VALUE: 35
PIF_VALUE: 31
PIF_VALUE: 35
PIF_VALUE: 37
PIF_VALUE: 36
PIF_VALUE: 36
PIF_VALUE: 25
PIF_VALUE: 4
PIF_VALUE: 23
PIF_VALUE: 19
PIF_VALUE: 31
PIF_VALUE: 37

## 2022-04-06 ASSESSMENT — PAIN SCALES - GENERAL
PAINLEVEL_OUTOF10: 0
PAINLEVEL_OUTOF10: 5
PAINLEVEL_OUTOF10: 2
PAINLEVEL_OUTOF10: 0

## 2022-04-06 NOTE — OP NOTE
decided to proceed with the above procedure. All the risks and benefits were explained to the patient, informed consent was obtained. DESCRIPTION OF PROCEDURE:    The patient was brought back to the operating room and he was positioned in the modified dorsal lithotomy position after general anesthesia was started. He was sterilely prepped and draped in standard fashion. All the pressure points were padded. 18-German Maldonado catheter was placed and then an incision was made in supraumbilical region and a Veress needle was placed through this into the peritoneum, and pneumoperitoneum was obtained. The rest of the ports were placed in the usual fashion. The 3 robotic ports were placed which were 8 mm ports and 2 assistant ports were placed, one 12 mm port and one 5 mm port. The patient was placed in Trendelenburg position and the robot was docked and the procedure started by identifying the bladder. The bladder was filled through the catheter and an incision was made into the bladder in a horizontal fashion at the dome of the bladder. The bladder was entered and the blood clots were irrigated out of the bladder laparoscopically and then we removed over 70 bladder stones using a combination of suctioning and physically removed with the laparoscopic \"scope\". This was sent as a permanent specimen to pathology. We then identified the catheter and identified the prostate, along with the ureteral orifices. After this, we incised the mucosa of the bladder and we began to dissect the plane between the adenoma of the prostate and the capsule of the prostate. This was done circumferentially making to stay away from the ureteral orifices. We carefully dissected this plane all the way around the prostate to core out the adenoma. Tenaculum was used on the 4th arm which helped with traction and we also fulgurated prostatic vessels along the way.  This plane continued all the way around the prostate posteriorly, laterally and anteriorly especially towards the apex of the prostate. We made sure not to injure surrounding tissues. At the apex of the prostate we stayed close tothe prostate and as we approached the urethra, we bivalved the prostate adenoma and we removed one--half to better allow visualization then we dissected the rest of the adenoma off and then we placed both specimens into a single Endo-Catch bag and we then irrigated the area. We made sure there was adequate hemostasis. 4 mL of Vistaseal was placed in the floor of the prostate urethra and near the apex. We irrigated the area again and made sure there was adequate hemostasis. After this was all done and ureteral orifices were intact, there is adequate hemostasis. We then placed a new 24 Wallisian 3-way Maldonado catheter into the bladder. Then we closed the bladder in 2 layers with 3-0 V-Loc stitches in a running fashion. After this we filled the bladder and made sure there was no leakage. We then undocked the robot. We elongated the umbilical skin incision. We removed the specimen bags through this incision. This was set as permanent specimen to pathology. We closed this incision with figure-of-8 stitches of 0 Vicryl, and all the skin incisions were closed with 4-0 Monocryl. A ROSALINDA drain was placed through the 4th robotic arm port. This was secured to the skin with 3-0 nylon and all the skin incisions were closed with 4-0 Monocryl with Dermabond and that was the end of the procedure. The patient was taken to recovery in good condition and will be admitted for routine postoperative care.       Electronically signed by Husam Hernandez MD on 4/6/2022 at 11:04 AM

## 2022-04-06 NOTE — ANESTHESIA PRE PROCEDURE
Department of Anesthesiology  Preprocedure Note       Name:  Francy Norwood   Age:  78 y.o.  :  1955                                          MRN:  [id number]         Date:  2022      Surgeon: Adria Valverde):  Cornel Phillips MD    Procedure: Procedure(s):  SUPRAPUBIC PROSTATECTOMY LAPAROSCOPIC ROBOTIC    Medications prior to admission:   Prior to Admission medications    Medication Sig Start Date End Date Taking?  Authorizing Provider   finasteride (PROSCAR) 5 MG tablet Take 1 tablet by mouth daily 3/17/22   Cornel Phillips MD   alfuzosin Caralee Grieve) 10 MG extended release tablet Take 1 tablet by mouth daily 22   Cornel Phillips MD       Current medications:    Current Facility-Administered Medications   Medication Dose Route Frequency Provider Last Rate Last Admin    0.9 % sodium chloride infusion   IntraVENous Continuous Abel Cevallos MD        lactated ringers infusion   IntraVENous Continuous Abel Cevallos  mL/hr at 22 0708 New Bag at 22 0708    sodium chloride flush 0.9 % injection 10 mL  10 mL IntraVENous 2 times per day Abel Cevallos MD        sodium chloride flush 0.9 % injection 10 mL  10 mL IntraVENous PRN Abel Cevallos MD        0.9 % sodium chloride infusion  25 mL IntraVENous PRN Abel Cevallos MD        ceFAZolin (ANCEF) 2000 mg in dextrose 5 % 50 mL IVPB  2,000 mg IntraVENous Q8H Cornel Phillips MD        ceFAZolin (ANCEF) IVPB             fentaNYL (SUBLIMAZE) 100 MCG/2ML injection             midazolam (VERSED) 2 MG/2ML injection             sodium chloride (PF) 0.9 % injection             bupivacaine liposome (EXPAREL) 1.3 % injection             bupivacaine (PF) (MARCAINE) 0.25 % injection             bupivacaine (PF) (MARCAINE) 0.25 % injection                Allergies:  No Known Allergies    Problem List:    Patient Active Problem List   Diagnosis Code    Morbid (severe) obesity due to excess calories ([facility] [address] C05.84    Umbilical hernia without obstruction or gangrene K42.9    Epigastric pain R10.13    Elevated PSA R97.20    Gross hematuria R31.0    BPH with obstruction/lower urinary tract symptoms N40.1, N13.8    Bladder calculi N21.0    BPH with urinary obstruction N40.1, N13.8       Past Medical History:        Diagnosis Date    Arthritis     Back pain     Hemorrhoids     High cholesterol     History of tinnitus     right ear    Prediabetes     no RX    Stomach ulcer        Past Surgical History:        Procedure Laterality Date    COLONOSCOPY N/A 03/10/2022     COLORECTAL CANCER SCREENING, NOT HIGH RISK (N/A )    COLONOSCOPY N/A 3/10/2022    COLONOSCOPY POLYPECTOMY HOT BIOPSY performed by Amanda Nayak DO at [address] W/BIOPSY SINGLE/MULTIPLE N/A 2018    COLONOSCOPY WITH BIOPSY performed by Efren Nash MD at [address] [id number]+O/[id number],JTZGK N/A     OPEN UMBILICAL HERNIA REPAIR performed by Efren Nash MD at [address] [place] [address]  2018    open       Social History:    Social History     Tobacco Use    Smoking status: Former Smoker     Packs/day: 1.00     Years: 10.00     Pack years: 10.00     Quit date: 1990     Years since quittin.2    Smokeless tobacco: Never Used   Substance Use Topics    Alcohol use:  No                                Counseling given: Not Answered      Vital Signs (Current):   Vitals:    22 0659   BP: 135/75   Pulse: 77   Resp: 15   Temp: 96.3 °F (35.7 °C)   TempSrc: Infrared   SpO2: 96%   Weight: 268 lb 6 oz (121.7 kg)                                              BP Readings from Last 3 Encounters:   22 135/75   22 136/87   03/10/22 110/61       NPO Status: Time of last liquid consumption:                         Time of last solid consumption:                         Date of last liquid consumption: 22                        Date of last solid food consumption: 04/05/22    BMI:   Wt Readings from Last 3 Encounters:   04/06/22 268 lb 6 oz (121.7 kg)   03/28/22 265 lb (120.2 kg)   03/24/22 267 lb (121.1 kg)     Body mass index is 36.4 kg/m². CBC:   Lab Results   Component Value Date    WBC 5.2 01/21/2022    RBC 5.42 01/21/2022    HGB 15.5 01/21/2022    HCT 48.9 01/21/2022    MCV 90.2 01/21/2022    RDW 12.8 01/21/2022     01/21/2022       CMP:   Lab Results   Component Value Date     01/21/2022    K 4.2 01/21/2022     01/21/2022    CO2 25 01/21/2022    BUN 15 01/21/2022    CREATININE 1.32 01/21/2022    GFRAA >60 01/21/2022    LABGLOM 54 01/21/2022    GLUCOSE 95 01/21/2022    PROT 7.4 01/21/2022    CALCIUM 8.9 01/21/2022    BILITOT 0.64 01/21/2022    ALKPHOS 81 01/21/2022    AST 16 01/21/2022    ALT 18 01/21/2022       POC Tests: No results for input(s): POCGLU, POCNA, POCK, POCCL, POCBUN, POCHEMO, POCHCT in the last 72 hours.     Coags: No results found for: PROTIME, INR, APTT    HCG (If Applicable): No results found for: PREGTESTUR, PREGSERUM, HCG, HCGQUANT     ABGs: No results found for: PHART, PO2ART, PWH2ZOZ, EQA1THV, BEART, N5JPJPLI     Type & Screen (If Applicable):  No results found for: LABABO, LABRH    Drug/Infectious Status (If Applicable):  No results found for: HIV, HEPCAB    COVID-19 Screening (If Applicable):   Lab Results   Component Value Date    COVID19 Detected 12/22/2020           Anesthesia Evaluation  Patient summary reviewed and Nursing notes reviewed no history of anesthetic complications:   Airway: Mallampati: III  TM distance: >3 FB   Neck ROM: full  Mouth opening: > = 3 FB Dental:    (+) edentulous      Pulmonary:Negative Pulmonary ROS and normal exam  breath sounds clear to auscultation                             Cardiovascular:    (+) hyperlipidemia        Rhythm: regular  Rate: normal                    Neuro/Psych:   Negative Neuro/Psych ROS              GI/Hepatic/Renal:   (+) PUD,          ROS comment: BENIGN PROSTATIC HYPERPLASIA WITH OBSTRUCTION  Severe obesity. Endo/Other:    (+) : arthritis: OA and no interval change. , . Abdominal:       Abdomen: soft. Vascular: negative vascular ROS. Other Findings:             Anesthesia Plan      general     ASA 2       Induction: intravenous. MIPS: Postoperative opioids intended and Prophylactic antiemetics administered. Anesthetic plan and risks discussed with patient. Use of blood products discussed with patient whom consented to blood products. Plan discussed with CRNA.                   Daria Enamorado MD   4/6/2022

## 2022-04-06 NOTE — PLAN OF CARE
Problem: Pain:  Goal: Pain level will decrease  Description: Pain level will decrease  Outcome: Ongoing     Problem: Physical Regulation:  Goal: Will remain free from infection  Description: Will remain free from infection  Outcome: Ongoing

## 2022-04-06 NOTE — BRIEF OP NOTE
Brief Postoperative Note      Patient: Dru Burnham  YOB: 1955  MRN: [id number]    Date of Procedure: 4/6/2022    Pre-Op Diagnosis: BENIGN PROSTATIC HYPERPLASIA WITH OBSTRUCTION    Post-Op Diagnosis: Same       Procedure(s):  SUPRAPUBIC PROSTATECTOMY LAPAROSCOPIC ROBOTIC, cystolithotomy    Surgeon(s):  Briseida Medrano MD    Assistant:  First Assistant: Waldo Peoples RN    Anesthesia: General    Estimated Blood Loss (mL): less than 760     Complications: None    Specimens:   ID Type Source Tests Collected by Time Destination   1 : Urine Culture Urine Urine, indwelling catheter CULTURE, URINE Briseida Medrano MD 4/6/2022 0754    A : BLADDER STONES Tissue Bladder SURGICAL PATHOLOGY Briseida Medrano MD 4/6/2022 4890    B : PROSTATE Tissue Prostate SURGICAL PATHOLOGY Briseida Medrano MD 4/6/2022 1016        Implants:  * No implants in log *      Drains:   Closed/Suction Drain Left LUQ Bulb 15 [place] (Active)       Urethral Catheter Triple-lumen 20 fr (Active)       [REMOVED] Urethral Catheter Double-lumen 20 fr (Removed)       Findings: >70 bladder stones    Electronically signed by Briseida Medrano MD on 4/6/2022 at 10:27 AM

## 2022-04-06 NOTE — CARE COORDINATION
Case Management Initial Discharge Plan  Tabitha Oppenheim,         Met with:patient to discuss discharge plans. Information verified: address, contacts, phone number, , insurance Yes  Insurance Provider: [employer] A[employer]    Emergency Contact/Next of Kin name & number: Queen Bart (Spouse) [phone]   Who are involved in patient's support system? family    PCP: SHELDON Gracia CNP  Date of last visit: 2022 per Epic    Discharge Planning    Living Arrangements:    with spouse    Home has 1 stories  1 stairs to climb to get into front door  Location of bedroom/bathroom in home 1    Patient able to perform ADL's:Independent    Current Services (outpatient & in home) none  DME equipment: none  DME provider:     Is patient receiving oral anticoagulation therapy? No    Does patient have any issues/concerns obtaining medications? No  If yes, what are patient's concerns? Is there a preferred Pharmacy after hours or on weekends? Yes    If yes, which pharmacy? [facility] on [address]  Potential Assistance Needed:       Patient agreeable to home care: No  [place] of choice provided:  no    Prior SNF/Rehab Placement and Facility: no  Agreeable to SNF/Rehab: No  [place] of choice provided: no     Evaluation: no    Expected Discharge date:       Patient expects to be discharged to: If home: is the family and/or caregiver wiling & able to provide support at home? yes  Who will be providing this support? family    Follow Up Appointment: Best Day/ Time:      Transportation provider: self  Transportation arrangements needed for discharge: No    Readmission Risk              Risk of Unplanned Readmission:  0         Does patient have a readmission risk score greater than 14?: No  If yes, follow-up appointment must be made within 7 days of discharge.      Goals of Care: post op care      Educated patient on transitional options, provided freedom of choice and are agreeable with plan      Discharge Plan: home with boykin catheter - needs leg bag and boykin care teaching          Electronically signed by Figueroa Wong RN on 4/6/22 at 12:00 PM EDT

## 2022-04-06 NOTE — BRIEF OP NOTE
Brief Postoperative Note      Patient: Flor Cage  YOB: 1955  MRN: [id number]    Date of Procedure: 4/6/2022    Pre-Op Diagnosis: BENIGN PROSTATIC HYPERPLASIA WITH OBSTRUCTION, bladder calculi    Post-Op Diagnosis: Same       Procedure(s):  SUPRAPUBIC PROSTATECTOMY LAPAROSCOPIC ROBOTIC and cystolithotomy    Surgeon(s):  Juan Pearl MD    Assistant:  First Assistant: Ave Bejaraon RN    Anesthesia: General    Estimated Blood Loss (mL): less than 616     Complications: None    Specimens:   ID Type Source Tests Collected by Time Destination   1 : Urine Culture Urine Urine, indwelling catheter CULTURE, URINE Juan Pearl MD 4/6/2022 0570    A : BLADDER STONES Tissue Bladder SURGICAL PATHOLOGY Juan Pearl MD 4/6/2022 0359    B : PROSTATE Tissue Prostate SURGICAL PATHOLOGY Juan Pearl MD 4/6/2022 1016        Implants:  * No implants in log *      Drains:   Closed/Suction Drain Left LUQ Bulb 15 [place] (Active)       Urethral Catheter Triple-lumen 20 fr (Active)       [REMOVED] Urethral Catheter Double-lumen 20 fr (Removed)       Findings: >70 bladder stones    Electronically signed by Juan Pearl MD on 4/6/2022 at 10:27 AM

## 2022-04-06 NOTE — ANESTHESIA PROCEDURE NOTES
Peripheral Block    Patient location during procedure: pre-op  Start time: 4/6/2022 7:30 AM  End time: 4/6/2022 7:33 AM  Staffing  Performed: anesthesiologist   Anesthesiologist: Keisha Vick MD  Preanesthetic Checklist  Completed: patient identified, IV checked, site marked, risks and benefits discussed, surgical consent, monitors and equipment checked, pre-op evaluation, timeout performed, anesthesia consent given, oxygen available and patient being monitored  Peripheral Block  Patient position: supine  Prep: ChloraPrep  Patient monitoring: cardiac monitor, continuous pulse ox, frequent blood pressure checks and IV access  Block type: TAP  Laterality: bilateral  Injection technique: single-shot  Guidance: ultrasound guided  Local infiltration: bupivacaine  Infiltration strength: 0.25 %  Dose: 2 mL  Provider prep: mask and sterile gloves  Expiration date: 12/31/2026  Kit: [id number]  Local infiltration: bupivacaine  Needle  Needle type: combined needle/nerve stimulator   Needle gauge: 20 G  Needle length: 10 cm  Needle localization: anatomical landmarks and ultrasound guidance  Needle insertion depth: 6 cm  Test dose: negative  Lot number: [id number]  Assessment  Injection assessment: negative aspiration for heme, local visualized surrounding nerve on ultrasound and no paresthesia on injection  Paresthesia pain: none  Slow fractionated injection: yes  Hemodynamics: stable  Medications Administered  Bupivacaine (MARCAINE) PF injection 0.25%, 50 mL  bupivacaine liposome (EXPAREL) injection 1.3%, 20 mL  Reason for block: post-op pain management and at surgeon's request

## 2022-04-06 NOTE — ANESTHESIA POSTPROCEDURE EVALUATION
Department of Anesthesiology  Postprocedure Note    Patient: Vic Taveras  MRN: [id number]  [place]: 1955  Date of evaluation: 4/6/2022  Time:  12:03 PM     Procedure Summary     Date: 04/06/22 Room / Location: [address] [place] [address] / [address] [facility]    Anesthesia Start: 4994 Anesthesia Stop: 1100    Procedure: SUPRAPUBIC PROSTATECTOMY LAPAROSCOPIC ROBOTIC (N/A ) Diagnosis: (BENIGN PROSTATIC HYPERPLASIA WITH OBSTRUCTION)    Surgeons: Tanya Howe MD Responsible Provider: Mauro Causey MD    Anesthesia Type: general ASA Status: 2          Anesthesia Type: general    Darrel Phase I: Darrel Score: 9    Darrel Phase II:      Last vitals: Reviewed and per EMR flowsheets.        Anesthesia Post Evaluation    Patient location during evaluation: PACU  Patient participation: complete - patient participated  Level of consciousness: awake and alert  Pain score: 0  Airway patency: patent  Nausea & Vomiting: no nausea and no vomiting  Complications: no  Cardiovascular status: blood pressure returned to baseline  Respiratory status: room air and acceptable  Hydration status: euvolemic

## 2022-04-07 VITALS
HEIGHT: 72 IN | RESPIRATION RATE: 16 BRPM | SYSTOLIC BLOOD PRESSURE: 125 MMHG | WEIGHT: 274.03 LBS | HEART RATE: 91 BPM | TEMPERATURE: 98.2 F | DIASTOLIC BLOOD PRESSURE: 69 MMHG | BODY MASS INDEX: 37.12 KG/M2 | OXYGEN SATURATION: 93 %

## 2022-04-07 LAB
ANION GAP SERPL CALCULATED.3IONS-SCNC: 7 MMOL/L (ref 9–17)
BUN BLDV-MCNC: 15 MG/DL (ref 8–23)
CALCIUM SERPL-MCNC: 8.2 MG/DL (ref 8.6–10.4)
CHLORIDE BLD-SCNC: 105 MMOL/L (ref 98–107)
CO2: 25 MMOL/L (ref 20–31)
CREAT SERPL-MCNC: 0.98 MG/DL (ref 0.7–1.2)
CULTURE: NO GROWTH
GFR AFRICAN AMERICAN: >60 ML/MIN
GFR NON-AFRICAN AMERICAN: >60 ML/MIN
GFR SERPL CREATININE-BSD FRML MDRD: ABNORMAL ML/MIN/{1.73_M2}
GLUCOSE BLD-MCNC: 114 MG/DL (ref 70–99)
HCT VFR BLD CALC: 38.1 % (ref 41–53)
HEMOGLOBIN: 13 G/DL (ref 13.5–17.5)
MCH RBC QN AUTO: 29.7 PG (ref 26–34)
MCHC RBC AUTO-ENTMCNC: 34 G/DL (ref 31–37)
MCV RBC AUTO: 87.3 FL (ref 80–100)
PDW BLD-RTO: 13.7 % (ref 12.5–15.4)
PLATELET # BLD: 228 K/UL (ref 140–450)
PMV BLD AUTO: 8 FL (ref 6–12)
POTASSIUM SERPL-SCNC: 4.2 MMOL/L (ref 3.7–5.3)
RBC # BLD: 4.36 M/UL (ref 4.5–5.9)
SODIUM BLD-SCNC: 137 MMOL/L (ref 135–144)
SPECIMEN DESCRIPTION: NORMAL
WBC # BLD: 8.8 K/UL (ref 3.5–11)

## 2022-04-07 PROCEDURE — G0378 HOSPITAL OBSERVATION PER HR: HCPCS

## 2022-04-07 PROCEDURE — 6360000002 HC RX W HCPCS: Performed by: SPECIALIST

## 2022-04-07 PROCEDURE — 36415 COLL VENOUS BLD VENIPUNCTURE: CPT

## 2022-04-07 PROCEDURE — 85027 COMPLETE CBC AUTOMATED: CPT

## 2022-04-07 PROCEDURE — 6370000000 HC RX 637 (ALT 250 FOR IP): Performed by: SPECIALIST

## 2022-04-07 PROCEDURE — 51702 INSERT TEMP BLADDER CATH: CPT

## 2022-04-07 PROCEDURE — 80048 BASIC METABOLIC PNL TOTAL CA: CPT

## 2022-04-07 PROCEDURE — 2580000003 HC RX 258: Performed by: SPECIALIST

## 2022-04-07 RX ORDER — SULFAMETHOXAZOLE AND TRIMETHOPRIM 800; 160 MG/1; MG/1
1 TABLET ORAL 2 TIMES DAILY
Qty: 14 TABLET | Refills: 0 | Status: ON HOLD | OUTPATIENT
Start: 2022-04-07 | End: 2022-05-06 | Stop reason: ALTCHOICE

## 2022-04-07 RX ADMIN — SODIUM CHLORIDE, POTASSIUM CHLORIDE, SODIUM LACTATE AND CALCIUM CHLORIDE: 600; 310; 30; 20 INJECTION, SOLUTION INTRAVENOUS at 02:07

## 2022-04-07 RX ADMIN — TAMSULOSIN HYDROCHLORIDE 0.4 MG: 0.4 CAPSULE ORAL at 10:08

## 2022-04-07 RX ADMIN — TROSPIUM CHLORIDE 20 MG: 20 TABLET, FILM COATED ORAL at 05:43

## 2022-04-07 RX ADMIN — SODIUM CHLORIDE, PRESERVATIVE FREE 10 ML: 5 INJECTION INTRAVENOUS at 10:09

## 2022-04-07 RX ADMIN — CEFAZOLIN SODIUM 2000 MG: 10 INJECTION, POWDER, FOR SOLUTION INTRAVENOUS at 00:05

## 2022-04-07 RX ADMIN — FINASTERIDE 5 MG: 5 TABLET, FILM COATED ORAL at 10:08

## 2022-04-07 ASSESSMENT — PAIN SCALES - GENERAL
PAINLEVEL_OUTOF10: 0
PAINLEVEL_OUTOF10: 0

## 2022-04-07 NOTE — PROGRESS NOTES
All discharge instructions reviewed, provided patient with additional boykin catheter bags and instructions. All questions answered at this time. Pt and wife verbalized understanding. IV removed per protocol. Pt discharged via wheelchair.

## 2022-04-07 NOTE — DISCHARGE SUMMARY
DISCHARGE SUMMARY NOTE:      Patient Identification  PATIENT: Kendall Canchola is a 78 y.o. male. MRN: [id number]  :  1955  Admit Date:  2022  Discharge date:   No discharge date for patient encounter. Disposition: home  Discharged Condition:  good  Discharge Diagnoses:   Patient Active Problem List   Diagnosis    Morbid (severe) obesity due to excess calories ([address]    Umbilical hernia without obstruction or gangrene    Epigastric pain    Elevated PSA    Gross hematuria    BPH with obstruction/lower urinary tract symptoms    Bladder calculi    BPH with urinary obstruction       Consults: none    Surgery: Robotic assisted (Ramon Lard) suprapubic prostatectomy and cystolithotomy    Patient Instructions: Activity: no heavy lifting > 15 pounds for 6 weeks  Diet: As tolerated  Zulema Lesch, M.D.'s office to schedule follow up cystogram in 1 week  Discharge Medications:      Medication List      START taking these medications    sulfamethoxazole-trimethoprim 800-160 MG per tablet  Commonly known as: Bactrim DS  Take 1 tablet by mouth 2 times daily        CONTINUE taking these medications    alfuzosin 10 MG extended release tablet  Commonly known as: UROXATRAL  Take 1 tablet by mouth daily     finasteride 5 MG tablet  Commonly known as: PROSCAR  Take 1 tablet by mouth daily           Where to Get Your Medications      These medications were sent to [address] [place] [address] 9, [address]    Phone: [phone]   · sulfamethoxazole-trimethoprim 800-160 MG per tablet         Hospital course: Patient underwent Robotic assisted (Ramon Lard) suprapubic prostatectomy and cystolithotomy. Patient placed on continuous bladder irrigation overnight and this was weaned off. Patient tolerated diet and was discharged home after his ROSALINDA drain removed.     Albino Molina MD  6:58 AM 4/7/2022

## 2022-04-07 NOTE — PLAN OF CARE
Problem: Pain:  Goal: Pain level will decrease  Description: Pain level will decrease  4/7/2022 0326 by Elizabeth Rocha RN  Outcome: Ongoing   Scheduled and PRN medications ordered (See Mar). Problem: Physical Regulation:  Goal: Will remain free from infection  Description: Will remain free from infection  4/7/2022 0326 by Elizabeth Rocha RN  Outcome: Ongoing     Problem: Skin Integrity:  Goal: Demonstration of wound healing without infection will improve  Description: Demonstration of wound healing without infection will improve  4/7/2022 0326 by Elizabeth Rocha RN  Outcome: Ongoing   Lap sites are C/D/I.

## 2022-04-07 NOTE — PROGRESS NOTES
Suman Anderson MD [facility]    Urology Progress Note    Subjective: Patient did great overnight and tolerating a regular diet. Patient Vitals for the past 24 hrs:   BP Temp Temp src Pulse Resp SpO2 Height Weight   04/07/22 0355 115/61 98.6 °F (37 °C) Oral 72 18 94 % -- --   04/06/22 2315 115/72 97.5 °F (36.4 °C) Oral 85 17 94 % -- --   04/06/22 1913 122/76 97.9 °F (36.6 °C) Oral 82 17 96 % -- --   04/06/22 1700 126/81 98.2 °F (36.8 °C) Oral 92 18 -- -- --   04/06/22 1219 -- -- -- -- -- -- 6' (1.829 m) 274 lb 0.5 oz (124.3 kg)   04/06/22 1156 (!) 145/81 97.4 °F (36.3 °C) Temporal 72 16 92 % -- --   04/06/22 1140 -- -- -- 68 11 95 % -- --   04/06/22 1125 -- -- -- 70 18 97 % -- --   04/06/22 1120 -- -- -- 72 18 95 % -- --   04/06/22 1115 137/80 -- -- 74 15 100 % -- --   04/06/22 1110 130/70 -- -- 74 13 99 % -- --   04/06/22 1105 138/71 -- -- 76 12 99 % -- --   04/06/22 1100 134/77 -- -- 74 18 97 % -- --   04/06/22 1056 136/77 96.1 °F (35.6 °C) Temporal 74 11 100 % -- --   04/06/22 0733 113/68 -- -- 66 11 97 % -- --   04/06/22 0731 109/67 -- -- 66 13 97 % -- --   04/06/22 0729 111/69 -- -- 68 12 96 % -- --   04/06/22 0659 135/75 96.3 °F (35.7 °C) Infrared 77 15 96 % -- 268 lb 6 oz (121.7 kg)       Intake/Output Summary (Last 24 hours) at 4/7/2022 0653  Last data filed at 4/7/2022 2108  Gross per 24 hour   Intake 4695.8 ml   Output 6951 ml   Net -2255.2 ml       Recent Labs     04/07/22  0540   WBC 8.8   HGB 13.0*   HCT 38.1*   MCV 87.3        Recent Labs     04/07/22  0540      K 4.2      CO2 25   BUN 15   CREATININE 0.98       No results for input(s): COLORU, PHUR, LABCAST, WBCUA, RBCUA, MUCUS, TRICHOMONAS, YEAST, BACTERIA, CLARITYU, SPECGRAV, LEUKOCYTESUR, UROBILINOGEN, BILIRUBINUR, BLOODU in the last 72 hours.     Invalid input(s): NITRATE, GLUCOSEUKETONESUAMORPHOUS    Additional Lab/culture results:    Physical Exam: Abdomen soft nontender; urine light pink    Interval Imaging Findings:    Impression:    Patient Active Problem List   Diagnosis    Morbid (severe) obesity due to excess calories ([address]    Umbilical hernia without obstruction or gangrene    Epigastric pain    Elevated PSA    Gross hematuria    BPH with obstruction/lower urinary tract symptoms    Bladder calculi    BPH with urinary obstruction       Plan: ROSALINDA drain removed. Home with indwelling boykin catheter.     Zora Irwin MD  6:53 AM 4/7/2022

## 2022-04-08 LAB — SURGICAL PATHOLOGY REPORT: NORMAL

## 2022-04-14 ENCOUNTER — HOSPITAL ENCOUNTER (OUTPATIENT)
Dept: GENERAL RADIOLOGY | Age: 67
Discharge: HOME | End: 2022-04-16
Payer: COMMERCIAL

## 2022-04-14 DIAGNOSIS — N13.8 ENLARGED PROSTATE WITH URINARY OBSTRUCTION: ICD-10-CM

## 2022-04-14 DIAGNOSIS — N40.1 ENLARGED PROSTATE WITH URINARY OBSTRUCTION: ICD-10-CM

## 2022-04-14 DIAGNOSIS — N21.0 CALCULUS IN DIVERTICULUM OF BLADDER: ICD-10-CM

## 2022-04-14 PROCEDURE — 6360000004 HC RX CONTRAST MEDICATION: Performed by: SPECIALIST

## 2022-04-14 PROCEDURE — 51600 INJECTION FOR BLADDER X-RAY: CPT

## 2022-04-14 RX ADMIN — IOTHALAMATE MEGLUMINE 200 ML: 172 INJECTION URETERAL at 07:52

## 2022-04-28 ENCOUNTER — HOSPITAL ENCOUNTER (OUTPATIENT)
Age: 67
Setting detail: SPECIMEN
Discharge: HOME | End: 2022-04-28

## 2022-04-28 DIAGNOSIS — R31.0 GROSS HEMATURIA: ICD-10-CM

## 2022-04-28 DIAGNOSIS — N30.01 ACUTE CYSTITIS WITH HEMATURIA: ICD-10-CM

## 2022-04-29 LAB
CULTURE: NO GROWTH
SPECIMEN DESCRIPTION: NORMAL

## 2022-05-05 ENCOUNTER — APPOINTMENT (OUTPATIENT)
Dept: CT IMAGING | Age: 67
DRG: 863 | End: 2022-05-05
Payer: COMMERCIAL

## 2022-05-05 ENCOUNTER — HOSPITAL ENCOUNTER (INPATIENT)
Age: 67
LOS: 3 days | Discharge: HOME | DRG: 863 | End: 2022-05-08
Attending: EMERGENCY MEDICINE | Admitting: INTERNAL MEDICINE
Payer: COMMERCIAL

## 2022-05-05 DIAGNOSIS — N17.9 AKI (ACUTE KIDNEY INJURY) (HCC): ICD-10-CM

## 2022-05-05 DIAGNOSIS — N30.91 HEMORRHAGIC CYSTITIS: Primary | ICD-10-CM

## 2022-05-05 PROBLEM — N30.01 ACUTE CYSTITIS WITH HEMATURIA: Status: ACTIVE | Noted: 2022-05-05

## 2022-05-05 PROBLEM — E78.00 HIGH CHOLESTEROL: Status: ACTIVE | Noted: 2022-05-05

## 2022-05-05 PROBLEM — N30.90 CYSTITIS: Status: ACTIVE | Noted: 2022-05-05

## 2022-05-05 LAB
-: ABNORMAL
ABSOLUTE EOS #: 0.07 K/UL (ref 0–0.44)
ABSOLUTE IMMATURE GRANULOCYTE: 0.05 K/UL (ref 0–0.3)
ABSOLUTE LYMPH #: 0.95 K/UL (ref 1.1–3.7)
ABSOLUTE MONO #: 0.46 K/UL (ref 0.1–1.2)
ALBUMIN SERPL-MCNC: 4 G/DL (ref 3.5–5.2)
ALP BLD-CCNC: 78 U/L (ref 40–129)
ALT SERPL-CCNC: 16 U/L (ref 5–41)
ANION GAP SERPL CALCULATED.3IONS-SCNC: 16 MMOL/L (ref 9–17)
AST SERPL-CCNC: 14 U/L
BASOPHILS # BLD: 1 % (ref 0–2)
BASOPHILS ABSOLUTE: 0.07 K/UL (ref 0–0.2)
BILIRUB SERPL-MCNC: 0.4 MG/DL (ref 0.3–1.2)
BILIRUBIN URINE: NEGATIVE
BUN BLDV-MCNC: 27 MG/DL (ref 8–23)
BUN/CREAT BLD: 13 (ref 9–20)
CALCIUM SERPL-MCNC: 8.8 MG/DL (ref 8.6–10.4)
CHLORIDE BLD-SCNC: 106 MMOL/L (ref 98–107)
CO2: 19 MMOL/L (ref 20–31)
COLOR: ABNORMAL
CREAT SERPL-MCNC: 2.02 MG/DL (ref 0.7–1.2)
EOSINOPHILS RELATIVE PERCENT: 1 % (ref 1–4)
EPITHELIAL CELLS UA: ABNORMAL /HPF (ref 0–5)
GFR AFRICAN AMERICAN: 40 ML/MIN
GFR NON-AFRICAN AMERICAN: 33 ML/MIN
GFR SERPL CREATININE-BSD FRML MDRD: ABNORMAL ML/MIN/{1.73_M2}
GLUCOSE BLD-MCNC: 178 MG/DL (ref 70–99)
GLUCOSE URINE: ABNORMAL
HCT VFR BLD CALC: 40.9 % (ref 40.7–50.3)
HEMOGLOBIN: 12.9 G/DL (ref 13–17)
IMMATURE GRANULOCYTES: 0 %
INR BLD: 1.1
KETONES, URINE: NEGATIVE
LEUKOCYTE ESTERASE, URINE: NEGATIVE
LYMPHOCYTES # BLD: 8 % (ref 24–43)
MCH RBC QN AUTO: 28.7 PG (ref 25.2–33.5)
MCHC RBC AUTO-ENTMCNC: 31.5 G/DL (ref 28.4–34.8)
MCV RBC AUTO: 90.9 FL (ref 82.6–102.9)
MONOCYTES # BLD: 4 % (ref 3–12)
NITRITE, URINE: POSITIVE
NRBC AUTOMATED: 0 PER 100 WBC
PARTIAL THROMBOPLASTIN TIME: 25 SEC (ref 23.9–33.8)
PDW BLD-RTO: 12.9 % (ref 11.8–14.4)
PH UA: 7.5 (ref 5–8)
PLATELET # BLD: 279 K/UL (ref 138–453)
PMV BLD AUTO: 9.9 FL (ref 8.1–13.5)
POTASSIUM SERPL-SCNC: 4 MMOL/L (ref 3.7–5.3)
PROTEIN UA: ABNORMAL
PROTHROMBIN TIME: 14.6 SEC (ref 11.5–14.2)
RBC # BLD: 4.5 M/UL (ref 4.21–5.77)
RBC UA: ABNORMAL /HPF (ref 0–2)
REASON FOR REJECTION: NORMAL
SEG NEUTROPHILS: 86 % (ref 36–65)
SEGMENTED NEUTROPHILS ABSOLUTE COUNT: 10.04 K/UL (ref 1.5–8.1)
SODIUM BLD-SCNC: 141 MMOL/L (ref 135–144)
SPECIFIC GRAVITY UA: 1.02 (ref 1–1.03)
TOTAL PROTEIN: 7.1 G/DL (ref 6.4–8.3)
TURBIDITY: ABNORMAL
URINE HGB: ABNORMAL
UROBILINOGEN, URINE: NORMAL
WBC # BLD: 11.6 K/UL (ref 3.5–11.3)
WBC UA: ABNORMAL /HPF (ref 0–5)
ZZ NTE CLEAN UP: ORDERED TEST: NORMAL
ZZ NTE WITH NAME CLEAN UP: SPECIMEN SOURCE: NORMAL

## 2022-05-05 PROCEDURE — 96374 THER/PROPH/DIAG INJ IV PUSH: CPT

## 2022-05-05 PROCEDURE — 96376 TX/PRO/DX INJ SAME DRUG ADON: CPT

## 2022-05-05 PROCEDURE — 85025 COMPLETE CBC W/AUTO DIFF WBC: CPT

## 2022-05-05 PROCEDURE — 99285 EMERGENCY DEPT VISIT HI MDM: CPT

## 2022-05-05 PROCEDURE — 6360000002 HC RX W HCPCS: Performed by: NURSE PRACTITIONER

## 2022-05-05 PROCEDURE — 74176 CT ABD & PELVIS W/O CONTRAST: CPT

## 2022-05-05 PROCEDURE — 87086 URINE CULTURE/COLONY COUNT: CPT

## 2022-05-05 PROCEDURE — 51702 INSERT TEMP BLADDER CATH: CPT

## 2022-05-05 PROCEDURE — 6370000000 HC RX 637 (ALT 250 FOR IP): Performed by: EMERGENCY MEDICINE

## 2022-05-05 PROCEDURE — 85610 PROTHROMBIN TIME: CPT

## 2022-05-05 PROCEDURE — 85730 THROMBOPLASTIN TIME PARTIAL: CPT

## 2022-05-05 PROCEDURE — 6370000000 HC RX 637 (ALT 250 FOR IP): Performed by: NURSE PRACTITIONER

## 2022-05-05 PROCEDURE — 2580000003 HC RX 258: Performed by: EMERGENCY MEDICINE

## 2022-05-05 PROCEDURE — 2580000003 HC RX 258: Performed by: NURSE PRACTITIONER

## 2022-05-05 PROCEDURE — 51798 US URINE CAPACITY MEASURE: CPT

## 2022-05-05 PROCEDURE — 99223 1ST HOSP IP/OBS HIGH 75: CPT | Performed by: NURSE PRACTITIONER

## 2022-05-05 PROCEDURE — 80053 COMPREHEN METABOLIC PANEL: CPT

## 2022-05-05 PROCEDURE — 6360000002 HC RX W HCPCS: Performed by: EMERGENCY MEDICINE

## 2022-05-05 PROCEDURE — 1200000000 HC SEMI PRIVATE

## 2022-05-05 PROCEDURE — 81001 URINALYSIS AUTO W/SCOPE: CPT

## 2022-05-05 RX ORDER — ONDANSETRON 2 MG/ML
4 INJECTION INTRAMUSCULAR; INTRAVENOUS EVERY 6 HOURS PRN
Status: DISCONTINUED | OUTPATIENT
Start: 2022-05-05 | End: 2022-05-08 | Stop reason: HOSPADM

## 2022-05-05 RX ORDER — SODIUM CHLORIDE 9 MG/ML
INJECTION, SOLUTION INTRAVENOUS PRN
Status: DISCONTINUED | OUTPATIENT
Start: 2022-05-05 | End: 2022-05-08 | Stop reason: HOSPADM

## 2022-05-05 RX ORDER — SODIUM CHLORIDE 0.9 % (FLUSH) 0.9 %
5-40 SYRINGE (ML) INJECTION PRN
Status: DISCONTINUED | OUTPATIENT
Start: 2022-05-05 | End: 2022-05-08 | Stop reason: HOSPADM

## 2022-05-05 RX ORDER — POLYETHYLENE GLYCOL 3350 17 G/17G
17 POWDER, FOR SOLUTION ORAL DAILY PRN
Status: DISCONTINUED | OUTPATIENT
Start: 2022-05-05 | End: 2022-05-08 | Stop reason: HOSPADM

## 2022-05-05 RX ORDER — ACETAMINOPHEN 325 MG/1
650 TABLET ORAL EVERY 6 HOURS PRN
Status: DISCONTINUED | OUTPATIENT
Start: 2022-05-05 | End: 2022-05-08 | Stop reason: HOSPADM

## 2022-05-05 RX ORDER — ONDANSETRON 4 MG/1
4 TABLET, ORALLY DISINTEGRATING ORAL EVERY 8 HOURS PRN
Status: DISCONTINUED | OUTPATIENT
Start: 2022-05-05 | End: 2022-05-08 | Stop reason: HOSPADM

## 2022-05-05 RX ORDER — PHENAZOPYRIDINE HYDROCHLORIDE 200 MG/1
200 TABLET, FILM COATED ORAL 3 TIMES DAILY PRN
Status: DISCONTINUED | OUTPATIENT
Start: 2022-05-05 | End: 2022-05-08 | Stop reason: HOSPADM

## 2022-05-05 RX ORDER — SODIUM CHLORIDE 0.9 % (FLUSH) 0.9 %
5-40 SYRINGE (ML) INJECTION EVERY 12 HOURS SCHEDULED
Status: DISCONTINUED | OUTPATIENT
Start: 2022-05-05 | End: 2022-05-08 | Stop reason: HOSPADM

## 2022-05-05 RX ORDER — ACETAMINOPHEN 650 MG/1
650 SUPPOSITORY RECTAL EVERY 6 HOURS PRN
Status: DISCONTINUED | OUTPATIENT
Start: 2022-05-05 | End: 2022-05-08 | Stop reason: HOSPADM

## 2022-05-05 RX ORDER — FINASTERIDE 5 MG/1
5 TABLET, FILM COATED ORAL DAILY
Status: DISCONTINUED | OUTPATIENT
Start: 2022-05-05 | End: 2022-05-08 | Stop reason: HOSPADM

## 2022-05-05 RX ORDER — TAMSULOSIN HYDROCHLORIDE 0.4 MG/1
0.4 CAPSULE ORAL DAILY
Status: DISCONTINUED | OUTPATIENT
Start: 2022-05-05 | End: 2022-05-08 | Stop reason: HOSPADM

## 2022-05-05 RX ORDER — ENOXAPARIN SODIUM 100 MG/ML
30 INJECTION SUBCUTANEOUS 2 TIMES DAILY
Status: DISCONTINUED | OUTPATIENT
Start: 2022-05-05 | End: 2022-05-06

## 2022-05-05 RX ORDER — HYDROMORPHONE HYDROCHLORIDE 1 MG/ML
1 INJECTION, SOLUTION INTRAMUSCULAR; INTRAVENOUS; SUBCUTANEOUS ONCE
Status: DISCONTINUED | OUTPATIENT
Start: 2022-05-05 | End: 2022-05-05

## 2022-05-05 RX ORDER — HYDROMORPHONE HYDROCHLORIDE 1 MG/ML
1 INJECTION, SOLUTION INTRAMUSCULAR; INTRAVENOUS; SUBCUTANEOUS
Status: DISCONTINUED | OUTPATIENT
Start: 2022-05-05 | End: 2022-05-08 | Stop reason: HOSPADM

## 2022-05-05 RX ORDER — ATROPA BELLADONNA AND OPIUM 16.2; 6 MG/1; MG/1
60 SUPPOSITORY RECTAL ONCE
Status: COMPLETED | OUTPATIENT
Start: 2022-05-05 | End: 2022-05-05

## 2022-05-05 RX ADMIN — ATROPA BELLADONNA AND OPIUM 60 MG: 16.2; 6 SUPPOSITORY RECTAL at 10:59

## 2022-05-05 RX ADMIN — TAMSULOSIN HYDROCHLORIDE 0.4 MG: 0.4 CAPSULE ORAL at 21:00

## 2022-05-05 RX ADMIN — CEFTRIAXONE SODIUM 1000 MG: 1 INJECTION, POWDER, FOR SOLUTION INTRAMUSCULAR; INTRAVENOUS at 06:41

## 2022-05-05 RX ADMIN — HYDROMORPHONE HYDROCHLORIDE 1 MG: 1 INJECTION, SOLUTION INTRAMUSCULAR; INTRAVENOUS; SUBCUTANEOUS at 22:43

## 2022-05-05 RX ADMIN — HYDROMORPHONE HYDROCHLORIDE 0.5 MG: 1 INJECTION, SOLUTION INTRAMUSCULAR; INTRAVENOUS; SUBCUTANEOUS at 04:53

## 2022-05-05 RX ADMIN — SODIUM CHLORIDE, PRESERVATIVE FREE 10 ML: 5 INJECTION INTRAVENOUS at 21:16

## 2022-05-05 RX ADMIN — FINASTERIDE 5 MG: 5 TABLET, FILM COATED ORAL at 21:00

## 2022-05-05 RX ADMIN — HYDROMORPHONE HYDROCHLORIDE 0.5 MG: 1 INJECTION, SOLUTION INTRAMUSCULAR; INTRAVENOUS; SUBCUTANEOUS at 04:07

## 2022-05-05 RX ADMIN — HYDROMORPHONE HYDROCHLORIDE 0.5 MG: 1 INJECTION, SOLUTION INTRAMUSCULAR; INTRAVENOUS; SUBCUTANEOUS at 06:09

## 2022-05-05 RX ADMIN — HYDROMORPHONE HYDROCHLORIDE 1 MG: 1 INJECTION, SOLUTION INTRAMUSCULAR; INTRAVENOUS; SUBCUTANEOUS at 10:59

## 2022-05-05 RX ADMIN — HYDROMORPHONE HYDROCHLORIDE 0.5 MG: 1 INJECTION, SOLUTION INTRAMUSCULAR; INTRAVENOUS; SUBCUTANEOUS at 06:42

## 2022-05-05 RX ADMIN — PHENAZOPYRIDINE HYDROCHLORIDE 200 MG: 200 TABLET ORAL at 21:46

## 2022-05-05 RX ADMIN — HYDROMORPHONE HYDROCHLORIDE 1 MG: 1 INJECTION, SOLUTION INTRAMUSCULAR; INTRAVENOUS; SUBCUTANEOUS at 15:14

## 2022-05-05 RX ADMIN — HYDROMORPHONE HYDROCHLORIDE 1 MG: 1 INJECTION, SOLUTION INTRAMUSCULAR; INTRAVENOUS; SUBCUTANEOUS at 18:19

## 2022-05-05 ASSESSMENT — PAIN DESCRIPTION - LOCATION
LOCATION: OTHER (COMMENT);ABDOMEN
LOCATION: OTHER (COMMENT);ABDOMEN

## 2022-05-05 ASSESSMENT — ENCOUNTER SYMPTOMS
DIARRHEA: 0
SHORTNESS OF BREATH: 0
VOMITING: 0
BACK PAIN: 0
CONSTIPATION: 0
ABDOMINAL PAIN: 1
ALLERGIC/IMMUNOLOGIC COMMENTS: NKDA
NAUSEA: 0

## 2022-05-05 ASSESSMENT — PAIN DESCRIPTION - ONSET: ONSET: ON-GOING

## 2022-05-05 ASSESSMENT — PAIN SCALES - GENERAL
PAINLEVEL_OUTOF10: 10
PAINLEVEL_OUTOF10: 9
PAINLEVEL_OUTOF10: 6
PAINLEVEL_OUTOF10: 10
PAINLEVEL_OUTOF10: 8
PAINLEVEL_OUTOF10: 10
PAINLEVEL_OUTOF10: 0
PAINLEVEL_OUTOF10: 5
PAINLEVEL_OUTOF10: 5
PAINLEVEL_OUTOF10: 9

## 2022-05-05 ASSESSMENT — PAIN - FUNCTIONAL ASSESSMENT
PAIN_FUNCTIONAL_ASSESSMENT: ACTIVITIES ARE NOT PREVENTED
PAIN_FUNCTIONAL_ASSESSMENT: 0-10

## 2022-05-05 ASSESSMENT — PAIN DESCRIPTION - DESCRIPTORS
DESCRIPTORS: PRESSURE
DESCRIPTORS: PRESSURE

## 2022-05-05 ASSESSMENT — PAIN DESCRIPTION - FREQUENCY: FREQUENCY: CONTINUOUS

## 2022-05-05 ASSESSMENT — PAIN DESCRIPTION - PAIN TYPE: TYPE: ACUTE PAIN

## 2022-05-05 NOTE — ED NOTES
Patient called out stating that his pain has returned. 3way catheter appears to not be draining. Hand irrigated but patient unable to tolerate. Dr Urbano Rodriguez notified. Pain medications ordered. Irrigation stopped at this time due to pain.         Sedrick De Anda RN  05/05/22 8497

## 2022-05-05 NOTE — ED NOTES
Dr. Caterina Davis in to see pt. He flushed bladder manually, red blood noted with small clots. Bladder irrigation restarted.       Zonia Ovalle RN  05/05/22 5030

## 2022-05-05 NOTE — H&P
[facility]  Office: [address], DO, Etta Angeles, DO, Bebomark Acosta, DO, Bob Catching Blood, DO, Yarely Hopper MD, Lyle Sargent MD, Anant Genao MD, Sigifredo Mcfadden MD, Chapincito Haywood MD, Nate Mauro MD, Anna Hess MD, Mayito Lea, DO, Rhonda Peres, DO, Stephon Cade MD,  Zoie De Luna, DO, Molly Leigh MD, Ling Og MD, Addi Michaels MD, Ganga Rivera DO, Padmini Ambrosio MD, Roxanna De Anda MD, Kristie Beckett [facility], [facility], [facility], Israel Marycarmen, CNP, Marcy Khan, CNP, Howard Flores, CNP, Nancy Easley, CNP, Catheryn Leventhal, PA-C, Paula Elena, [facility], Tahir Dangelo, CNP, Anh Alfaro, CNP, Curt Hernandes, CNP, David Ramírez, CNS, Johana Loomis, [facility], Jess Olson, CNP, Sebastián Jensen, CNP, Rodríguez Amezcua, [facility]    HISTORY AND PHYSICAL EXAMINATION            Date:   5/5/2022  Patient name:  Piyush Kate  Date of admission:  5/5/2022  3:37 AM  MRN:   [id number]  Account:  [de-identified]  YOB: 1955  PCP:    SHELDON Stuart CNP  Room:   [place] [address]  Code Status:    Prior    Chief Complaint:     Chief Complaint   Patient presents with    Other     Pt states he had surgery to remove bladder stones one month ago and has been passing clots but today is having difficulty voiding    Abdominal Pain       History Obtained From:     patient    History of Present Illness:     Piyush Kate is a 78 y.o.   male with a history of BPH with urinary obstruction who presents with suprapubic pain and pressure, difficulty urinating and hematuria  and is admitted to the hospital for the management of Acute cystitis with hematuria. Patient recently had robotic assisted suprapubic prostatectomy and cystolithotomy by Dr. Marine Nichols on 4/6/22. He states he was sent home with bactrim and was doing well at home until fairly recently.  He followed up for his post-op visit  On 4/28 and was having similar symptoms at that time. He states he was started on antibiotics but UA was negative for infection and they were stopped. He states his symptoms have gotten progressively worse since then. His pain became quite severe last night. He denies any fever, back pain, chills or changes in his bowel habits. He is not on any blood thinners. Maldonado catheter placement with bladder irrigation attempted in ED unsuccessful due to blood clots despite manual irrigation. CT abd/pelvis consistent with severe emphysematous cystitis. Bladder appears filled with clot and hemorrhage.   Bilateral mildly atrophic kidneys with multiple parapelvic cysts, stable    UA positive for nitrates, negative leukocytes, WBCs 5-10, + RBC's     Creatinine 2.02, BUN 27 previously normal, H&H 12.9/40.9    Past Medical History:     Past Medical History:   Diagnosis Date    Arthritis     Back pain     Hemorrhoids     High cholesterol     History of tinnitus     right ear    Prediabetes     no RX    Stomach ulcer         Past Surgical History:     Past Surgical History:   Procedure Laterality Date    COLONOSCOPY N/A 03/10/2022     COLORECTAL CANCER SCREENING, NOT HIGH RISK (N/A )    COLONOSCOPY N/A 3/10/2022    COLONOSCOPY POLYPECTOMY HOT BIOPSY performed by Davina Reynolds DO at [address] SINGLE/MULTIPLE N/A 4/11/2018    COLONOSCOPY WITH BIOPSY [facility]  04/06/2022    SUPRAPUBIC PROSTATECTOMY LAPAROSCOPIC ROBOTIC     PROSTATECTOMY N/A 4/6/2022    SUPRAPUBIC PROSTATECTOMY LAPAROSCOPIC ROBOTIC performed by Cookie Spatz, MD at [address],0+G/X,TURXK N/A 9/4/8645    OPEN UMBILICAL HERNIA REPAIR performed by Alicia Velasco MD at [address] [facility]  01/04/2018    open        Medications Prior to Admission:     Prior to Admission medications Medication Sig Start Date End Date Taking? Authorizing Provider   levoFLOXacin (LEVAQUIN) 500 MG tablet Take 1 tablet by mouth daily for 10 days 4/28/22 5/8/22  Candi Plata MD   phenazopyridine (PYRIDIUM) 200 MG tablet Take 1 tablet by mouth 3 times daily as needed for Pain 4/28/22   Candi Plata MD   sulfamethoxazole-trimethoprim (BACTRIM DS) 800-160 MG per tablet Take 1 tablet by mouth 2 times daily  Patient not taking: Reported on 4/28/2022 4/7/22   Candi Plata MD   finasteride (PROSCAR) 5 MG tablet Take 1 tablet by mouth daily 3/17/22   Candi Plata MD   alfuzosin Geroldine Eduardo) 10 MG extended release tablet Take 1 tablet by mouth daily 2/17/22   Candi Plata MD        Allergies:     Patient has no known allergies. Social History:     Tobacco:    reports that he quit smoking about 32 years ago. He has a 10.00 pack-year smoking history. He has never used smokeless tobacco.  Alcohol:      reports no history of alcohol use. Drug Use:  reports no history of drug use. Family History:     Family History   Problem Relation Age of Onset    No Known Problems Mother     Cancer Father     Alcohol Abuse Father     No Known Problems Sister     No Known Problems Sister     No Known Problems Sister        Review of Systems:     Positive and Negative as described in HPI. CONSTITUTIONAL:  negative for fevers, chills, sweats, fatigue, weight loss  HEENT:  negative for vision, hearing changes, runny nose, throat pain  RESPIRATORY:  negative for shortness of breath, cough, congestion, wheezing  CARDIOVASCULAR:  negative for chest pain, palpitations  GASTROINTESTINAL:  negative for nausea, vomiting, diarrhea, constipation, change in bowel habits, abdominal pain   GENITOURINARY:  Positive for dysuria, hematuria and suprapubic pain. negative forburning with urination  INTEGUMENT:  negative for rash, skin lesions, easy bruising   HEMATOLOGIC/LYMPHATIC:  negative for swelling/edema ALLERGIC/IMMUNOLOGIC:  negative for urticaria , itching  ENDOCRINE:  negative increase in drinking, increase in urination, hot or cold intolerance  MUSCULOSKELETAL:  negative joint pains, muscle aches, swelling of joints  NEUROLOGICAL:  negative for headaches, dizziness, lightheadedness, numbness, pain, tingling extremities  BEHAVIOR/PSYCH:  negative for depression, anxiety    Physical Exam:   BP (!) 199/121   Pulse 127   Temp 98.1 °F (36.7 °C)   Resp 24   Ht 6' (1.829 m)   Wt 274 lb (124.3 kg)   SpO2 97%   BMI 37.16 kg/m²   Temp (24hrs), Av.1 °F (36.7 °C), Min:98.1 °F (36.7 °C), Max:98.1 °F (36.7 °C)    No results for input(s): POCGLU in the last 72 hours. No intake or output data in the 24 hours ending 22 0742    General Appearance:  alert, well appearing, and in no acute distress  Mental status: oriented to person, place, and time  Head:  normocephalic, atraumatic  Eye: no icterus, redness, pupils equal and reactive, extraocular eye movements intact, conjunctiva clear  Ear: normal external ear, no discharge, hearing intact  Nose:  no drainage noted  Mouth: mucous membranes moist  Neck: supple, no carotid bruits, thyroid not palpable  Lungs: Bilateral equal air entry, clear to auscultation, no wheezing, rales or rhonchi, normal effort  Cardiovascular: normal rate, regular rhythm, no murmur, gallop, rub.   Abdomen: Soft, nontender, nondistended, normal bowel sounds, no hepatomegaly or splenomegaly  Neurologic: There are no new focal motor or sensory deficits, normal muscle tone and bulk, no abnormal sensation, normal speech, cranial nerves II through XII grossly intact  Skin: No gross lesions, rashes, bruising or bleeding on exposed skin area  Extremities:  peripheral pulses palpable, no pedal edema or calf pain with palpation  Psych: normal affect     Investigations:      Laboratory Testing:  Recent Results (from the past 24 hour(s))   CBC with Auto Differential    Collection Time: 22 3:56 AM   Result Value Ref Range    WBC 11.6 (H) 3.5 - 11.3 k/uL    RBC 4.50 4.21 - 5.77 m/uL    Hemoglobin 12.9 (L) 13.0 - 17.0 g/dL    Hematocrit 40.9 40.7 - 50.3 %    MCV 90.9 82.6 - 102.9 fL    MCH 28.7 25.2 - 33.5 pg    MCHC 31.5 28.4 - 34.8 g/dL    RDW 12.9 11.8 - 14.4 %    Platelets 664 233 - 261 k/uL    MPV 9.9 8.1 - 13.5 fL    NRBC Automated 0.0 0.0 per 100 WBC    Seg Neutrophils 86 (H) 36 - 65 %    Lymphocytes 8 (L) 24 - 43 %    Monocytes 4 3 - 12 %    Eosinophils % 1 1 - 4 %    Basophils 1 0 - 2 %    Immature Granulocytes 0 0 %    Segs Absolute 10.04 (H) 1.50 - 8.10 k/uL    Absolute Lymph # 0.95 (L) 1.10 - 3.70 k/uL    Absolute Mono # 0.46 0.10 - 1.20 k/uL    Absolute Eos # 0.07 0.00 - 0.44 k/uL    Basophils Absolute 0.07 0.00 - 0.20 k/uL    Absolute Immature Granulocyte 0.05 0.00 - 0.30 k/uL   APTT    Collection Time: 05/05/22  3:56 AM   Result Value Ref Range    PTT 25.0 23.9 - 33.8 sec   Protime-INR    Collection Time: 05/05/22  3:56 AM   Result Value Ref Range    Protime 14.6 (H) 11.5 - 14.2 sec    INR 1.1    SPECIMEN REJECTION    Collection Time: 05/05/22  3:56 AM   Result Value Ref Range    Specimen Source . BLOOD     Ordered Test CMPX     Reason for Rejection Unable to perform testing: Specimen hemolyzed.     Urinalysis with Microscopic    Collection Time: 05/05/22  4:30 AM   Result Value Ref Range    Color, UA Red (A) Yellow    Turbidity UA Cloudy (A) Clear    Glucose, Ur TRACE (A) NEGATIVE    Bilirubin Urine NEGATIVE NEGATIVE    Ketones, Urine NEGATIVE NEGATIVE    Specific Gravity, UA 1.025 1.005 - 1.030    Urine Hgb 3+ (A) NEGATIVE    pH, UA 7.5 5.0 - 8.0    Protein, UA 3+ (A) NEGATIVE    Urobilinogen, Urine Normal Normal    Nitrite, Urine POSITIVE (A) NEGATIVE    Leukocyte Esterase, Urine NEGATIVE NEGATIVE    -          WBC, UA 5 TO 10 0 - 5 /HPF    RBC, UA TOO NUMEROUS TO COUNT 0 - 2 /HPF    Epithelial Cells UA 0 TO 2 0 - 5 /HPF   Comprehensive Metabolic Panel w/ Reflex to MG    Collection Time: 05/05/22  5:34 AM   Result Value Ref Range    Glucose 178 (H) 70 - 99 mg/dL    BUN 27 (H) 8 - 23 mg/dL    CREATININE 2.02 (H) 0.70 - 1.20 mg/dL    Bun/Cre Ratio 13 9 - 20    Calcium 8.8 8.6 - 10.4 mg/dL    Sodium 141 135 - 144 mmol/L    Potassium 4.0 3.7 - 5.3 mmol/L    Chloride 106 98 - 107 mmol/L    CO2 19 (L) 20 - 31 mmol/L    Anion Gap 16 9 - 17 mmol/L    Alkaline Phosphatase 78 40 - 129 U/L    ALT 16 5 - 41 U/L    AST 14 <40 U/L    Total Bilirubin 0.40 0.3 - 1.2 mg/dL    Total Protein 7.1 6.4 - 8.3 g/dL    Albumin 4.0 3.5 - 5.2 g/dL    GFR Non- 33 (L) >60 mL/min    GFR African American 40 (L) >60 mL/min    GFR Comment             Imaging/Diagnostics:  CT ABDOMEN PELVIS WO CONTRAST Additional Contrast? None    Result Date: 5/5/2022  Findings consistent with severe emphysematous cystitis. The bladder appears filled with clot and hemorrhage. Bilateral mildly atrophic kidneys with multiple bilateral parapelvic cysts, stable. Assessment :      Hospital Problems           Last Modified POA    * (Principal) Acute cystitis with hematuria 5/5/2022 Yes    JOSE EDUARDO (acute kidney injury) ([facility] [address] 5/5/2022 Yes    BPH with urinary obstruction 5/5/2022 Yes          Plan:     Patient status inpatient in the Med/Surge    1. Resume select home meds  2. Urology consult:will need catheter placement for bladder irrigation, await further recommendations  3. Monitor labs, replace electrolytes as needed  4. Pain control   5. Flomax daily  6. Hold anticoagulation due to gross hematuria   7. JOSE EDUARDO likely due to obstructive uropathy: Start IV fluids once catheter in place, trend creatinine  8. Monitor intake and output   9. IV rocephin   10. Trend H&H   11. Urine culture in process   12. General diet  13.  Plan discussed with patient and staff     Consultations:   IP CONSULT TO UROLOGY  IP CONSULT TO HOSPITALIST  IP CONSULT TO UROLOGY    Patient is admitted as inpatient status because of co-morbidities listed above, severity of signs and symptoms as outlined, requirement for current medical therapies and most importantly because of direct risk to patient if care not provided in a hospital setting. Expected length of stay > 48 hours.     Kae Dubin, APRN - NP  5/5/2022  7:42 AM    Copy sent to Dr. Duarte Lucas, APRN - CNP

## 2022-05-05 NOTE — ED NOTES
Bladder scan showing 46ML in bladder. Patient visibly uncomfortable in room, unable to sit still and in excruciating pain.      Candance Fore, RN  05/05/22 0492

## 2022-05-05 NOTE — CARE COORDINATION
Case Management Initial Discharge Plan  Dru Burnham,         Readmission Risk              Risk of Unplanned Readmission:  11             Met with:patient to discuss discharge plans. Information verified: address, contacts, phone number, , insurance Yes  PCP: SHELDON Garza CNP  Date of last visit: 22    Insurance Provider: [facility] [place]     Discharge Planning  Current Residence: Home   Living Arrangements: Wife, Son, DIL and granddaughter   Home has 1 story/1 step to climb into home   Support Systems: Family   Current Services PTA: None  Supplier: n/a  Patient able to perform ADL's:Independent  DME used to aid ambulation prior to admission: n/a /during admission n/a    Potential Assistance Needed: None at this time     [address]: [id number] Medications:     Does patient want to participate in local refill/ meds to beds program?       Patient agreeable to home care: No  [place] of choice provided:  n/a      Type of Home Care Services: None  Patient expects to be discharged to: Home    Prior SNF/Rehab Placement and Facility: n/a  Agreeable to SNF/Rehab: No  [place] of choice provided: n/a   Evaluation: yes    Expected Discharge date: TBD  Follow Up Appointment: Best Day/ Time:      Transportation provider: wife  Transportation arrangements needed for discharge: No    Discharge Plan:   Pt reports that he is independent at home. Pt requires no DME. Lives at home with wife, son, daughter in law and granddaughter. Pt reports previous bladder/prostate issues/surgery, but has never utilizes [place] [id number] or SNF.          Electronically signed by RUDOLPH Wade on 22 at 1:30 PM EDT

## 2022-05-05 NOTE — ED PROVIDER NOTES
[facility]  Emergency Department Encounter     Pt Name: Kalpana Yip  MRN: [id number]  [place] 1955  Date of evaluation: 5/5/22  PCP:  SHELDON Bone CNP    CHIEF COMPLAINT       Chief Complaint   Patient presents with    Other     Pt states he had surgery to remove bladder stones one month ago and has been passing clots but today is having difficulty voiding    Abdominal Pain       HISTORY OF PRESENT ILLNESS  (Location/Symptom, Timing/Onset, Context/Setting, Quality, Duration, Modifying Factors, Severity.)    Kalpana Yip is a 78 y.o. male who presents with suprapubic abdominal pain and pressure, urinary urgency but unable to urinate and when he is there is obviously bloody. He states that he recently has seen urology to have bladder stones removed and had a follow-up in the office and was doing well and was taken off antibiotics, however yesterday evening he started developing the symptoms and they have been rapidly progressively getting worse. He has not had any fevers chills or sweats. No back pain. Is not on any blood thinners. PAST MEDICAL / SURGICAL / SOCIAL / FAMILY HISTORY    has a past medical history of Arthritis, Back pain, Hemorrhoids, High cholesterol, History of tinnitus, Prediabetes, and Stomach ulcer. has a past surgical history that includes Tonsillectomy and adenoidectomy; Umbilical hernia repair (60/43/2405); repair umbilical YJNK,4+M/M,JORKV (N/A, 1/4/2018); pr colonoscopy w/biopsy single/multiple (N/A, 4/11/2018); Colonoscopy (N/A, 03/10/2022); Colonoscopy (N/A, 3/10/2022); Prostatectomy (04/06/2022); and Prostatectomy (N/A, 4/6/2022).     Social History     Socioeconomic History    Marital status:      Spouse name: Not on file    Number of children: Not on file    Years of education: Not on file    Highest education level: Not on file   Occupational History    Not on file   Tobacco Use    Smoking status: Former Smoker     Packs/day: 1.00     Years: 10.00     Pack years: 10.00     Quit date: 1990     Years since quittin.3    Smokeless tobacco: Never Used   Vaping Use    Vaping Use: Never used   Substance and Sexual Activity    Alcohol use: No    Drug use: No    Sexual activity: Yes     Partners: Female   Other Topics Concern    Not on file   Social History Narrative    Not on file     Social Determinants of Health     Financial Resource Strain:     Difficulty of Paying Living Expenses: Not on file   Food Insecurity:     Worried About Running Out of Food in the Last Year: Not on file    Sri of Food in the Last Year: Not on file   Transportation Needs:     Lack of Transportation (Medical): Not on file    Lack of Transportation (Non-Medical):  Not on file   Physical Activity:     Days of Exercise per Week: Not on file    Minutes of Exercise per Session: Not on file   Stress:     Feeling of Stress : Not on file   Social Connections:     Frequency of Communication with Friends and Family: Not on file    Frequency of Social Gatherings with Friends and Family: Not on file    Attends Yarsanism Services: Not on file    Active Member of Clubs or Organizations: Not on file    Attends Club or Organization Meetings: Not on file    Marital Status: Not on file   Intimate Partner Violence:     Fear of Current or Ex-Partner: Not on file    Emotionally Abused: Not on file    Physically Abused: Not on file    Sexually Abused: Not on file   Housing Stability:     Unable to Pay for Housing in the Last Year: Not on file    Number of [place] in the Last Year: Not on file    Unstable Housing in the Last Year: Not on file       Family History   Problem Relation Age of Onset    No Known Problems Mother     Cancer Father     Alcohol Abuse Father     No Known Problems Sister     No Known Problems Sister     No Known Problems Sister        Allergies:    Patient has no known allergies. Home Medications:  Prior to Admission medications    Medication Sig Start Date End Date Taking? Authorizing Provider   levoFLOXacin (LEVAQUIN) 500 MG tablet Take 1 tablet by mouth daily for 10 days 4/28/22 5/8/22  Briseida Medrano MD   phenazopyridine (PYRIDIUM) 200 MG tablet Take 1 tablet by mouth 3 times daily as needed for Pain 4/28/22   Briseida Medrano MD   sulfamethoxazole-trimethoprim (BACTRIM DS) 800-160 MG per tablet Take 1 tablet by mouth 2 times daily  Patient not taking: Reported on 4/28/2022 4/7/22   Briseida Medrano MD   finasteride (PROSCAR) 5 MG tablet Take 1 tablet by mouth daily 3/17/22   Briseida Medrano, MD   alfuzosin (UROXATRAL) 10 MG extended release tablet Take 1 tablet by mouth daily 2/17/22   Briseida Medrano MD       REVIEW OF SYSTEMS    (2-9 systems for level 4, 10 or more for level 5)    Review of Systems   Constitutional: Negative for chills, diaphoresis and fever. Respiratory: Negative for shortness of breath. Cardiovascular: Negative for chest pain. Gastrointestinal: Positive for abdominal pain. Negative for constipation, diarrhea, nausea and vomiting. Endocrine: Negative for polyuria. Genitourinary: Positive for decreased urine volume, difficulty urinating, hematuria and urgency. Negative for dysuria and flank pain. Musculoskeletal: Negative for back pain. Allergic/Immunologic:        NKDA   Neurological: Negative for dizziness and light-headedness. Hematological: Does not bruise/bleed easily. PHYSICAL EXAM   (up to 7 for level 4, 8 or more for level 5)    VITALS:   Vitals:    05/05/22 0206   BP: (!) 199/121   Pulse: 127   Resp: 24   Temp: 98.1 °F (36.7 °C)   SpO2: 97%   Weight: 274 lb (124.3 kg)   Height: 6' (1.829 m)       Physical Exam  Vitals and nursing note reviewed. Constitutional:       General: He is in acute distress. Appearance: He is well-developed. He is obese. He is not diaphoretic.    HENT:      Head: Normocephalic and atraumatic. Eyes:      Conjunctiva/sclera: Conjunctivae normal.   Cardiovascular:      Rate and Rhythm: Regular rhythm. Tachycardia present. Heart sounds: Normal heart sounds. Pulmonary:      Effort: Pulmonary effort is normal. No respiratory distress. Breath sounds: Normal breath sounds. No wheezing, rhonchi or rales. Abdominal:      General: Abdomen is protuberant. There is no distension. Palpations: Abdomen is soft. Tenderness: There is abdominal tenderness in the suprapubic area. There is guarding. Musculoskeletal:         General: Normal range of motion. Cervical back: Normal range of motion. Skin:     General: Skin is warm and dry. Neurological:      General: No focal deficit present. Mental Status: He is alert.    Psychiatric:         Behavior: Behavior normal.         DIFFERENTIAL  DIAGNOSIS   PLAN (LABS / IMAGING / EKG):  Orders Placed This Encounter   Procedures    Culture, Urine    CT ABDOMEN PELVIS WO CONTRAST Additional Contrast? None    Urinalysis with Microscopic    CBC with Auto Differential    APTT    Protime-INR    SPECIMEN REJECTION    Comprehensive Metabolic Panel w/ Reflex to MG    Bladder scan    Insert indwelling urinary catheter    Inpatient consult to Urology    Inpatient consult to Hospitalist    ADMIT TO INPATIENT       MEDICATIONS ORDERED:  Orders Placed This Encounter   Medications    HYDROmorphone (DILAUDID) injection 0.5 mg    DISCONTD: HYDROmorphone HCl PF (DILAUDID) injection 1 mg    HYDROmorphone (DILAUDID) 1 MG/ML injection     [employer]: cabinet override    HYDROmorphone (DILAUDID) injection 0.5 mg    HYDROmorphone (DILAUDID) injection 0.5 mg    cefTRIAXone (ROCEPHIN) 1000 mg IVPB in 50 mL D5W minibag     Order Specific Question:   Antimicrobial Indications     Answer:   Intra-Abdominal Infection    HYDROmorphone (DILAUDID) injection 0.5 mg     DIAGNOSTIC RESULTS / EMERGENCYDEPARTMENT COURSE / MDM LABS:  Labs Reviewed   URINALYSIS WITH MICROSCOPIC - Abnormal; Notable for the following components:       Result Value    Color, UA Red (*)     Turbidity UA Cloudy (*)     Glucose, Ur TRACE (*)     Urine Hgb 3+ (*)     Protein, UA 3+ (*)     Nitrite, Urine POSITIVE (*)     All other components within normal limits   CBC WITH AUTO DIFFERENTIAL - Abnormal; Notable for the following components:    WBC 11.6 (*)     Hemoglobin 12.9 (*)     Seg Neutrophils 86 (*)     Lymphocytes 8 (*)     Segs Absolute 10.04 (*)     Absolute Lymph # 0.95 (*)     All other components within normal limits   PROTIME-INR - Abnormal; Notable for the following components:    Protime 14.6 (*)     All other components within normal limits   COMPREHENSIVE METABOLIC PANEL W/ REFLEX TO MG FOR LOW K - Abnormal; Notable for the following components:    Glucose 178 (*)     BUN 27 (*)     CREATININE 2.02 (*)     CO2 19 (*)     GFR Non- 33 (*)     GFR  40 (*)     All other components within normal limits   CULTURE, URINE   APTT   SPECIMEN REJECTION       RADIOLOGY:  CT ABDOMEN PELVIS WO CONTRAST Additional Contrast? None    Result Date: 5/5/2022  EXAMINATION: CT OF THE ABDOMEN AND PELVIS WITHOUT CONTRAST 5/5/2022 5:33 am TECHNIQUE: CT of the abdomen and pelvis was performed without the administration of intravenous contrast. Multiplanar reformatted images are provided for review. Dose modulation, iterative reconstruction, and/or weight based adjustment of the mA/kV was utilized to reduce the radiation dose to as low as reasonably achievable.  COMPARISON: 03/02/2022 HISTORY: ORDERING SYSTEM PROVIDED HISTORY: suprapubic abd pain recent instrumentation from uro TECHNOLOGIST PROVIDED HISTORY: suprapubic abd pain recent instrumentation from uro Decision Support Exception - unselect if not a suspected or confirmed emergency medical condition->Emergency Medical Condition (MA) Reason for Exam: Pt c/o suprapubic abdominal pain recent instrumentation from uro. Hx of prostatectomy, hernia repair FINDINGS: Lower Chest:  Visualized portion of the lower chest demonstrates no acute abnormality. Organs: The liver, gallbladder, pancreas, spleen, adrenal glands and ureters are unremarkable. There is mild atrophy of the bilateral kidneys. Multiple bilateral parapelvic cysts are again noted and not significantly changed from prior. GI/Bowel: Stomach and duodenal sweep demonstrate no acute abnormality. There is no evidence of bowel obstruction. No evidence of abnormal bowel wall thickening or distension. The appendix is visualized and is unremarkable. No evidence of acute appendicitis. Pelvis: The bladder contains a Maldonado catheter and heterogeneously hyperdense material.  The bladder wall is emphysematous. There is minor surrounding inflammatory change anteriorly. Reproductive organs are unremarkable. Peritoneum/Retroperitoneum: No evidence of ascites or free air. No evidence of lymphadenopathy. Aorta is normal in caliber. Bones/Soft Tissues: No acute bone or soft tissue abnormality. Findings consistent with severe emphysematous cystitis. The bladder appears filled with clot and hemorrhage. Bilateral mildly atrophic kidneys with multiple bilateral parapelvic cysts, stable.      EMERGENCY DEPARTMENT COURSE:  ED Course as of 05/05/22 0653   u May 05, 2022   0416 CBC with Auto Differential(!):    WBC 11.6(!)   RBC 4.50   Hemoglobin Quant 12.9(!)   Hematocrit 40.9   MCV 90.9   MCH 28.7   MCHC 31.5   RDW 12.9   Platelet Count 895   MPV 9.9   NRBC Automated 0.0   Seg Neutrophils 86(!)   Lymphocytes 8(!)   Monocytes 4   Eosinophils % 1   Basophils 1   Immature Granulocytes 0   Segs Absolute 10.04(!)   Absolute Lymph # 0.95(!)   Absolute Mono # 0.46   Absolute Eos # 0.07   Basophils Absolute 0.07   Absolute Immature Granulocyte 0.05 [AO]   0600 Comprehensive Metabolic Panel w/ Reflex to MG(!):    GLUCOSE, FASTING,(!)   BUN,BUNPL 27(!) Creatinine 2.02(!)   Bun/Cre Ratio 13   CALCIUM, SERUM, 112803 8.8   Sodium 141   Potassium 4.0   Chloride 106   CO2 19(!)   Anion Gap 16   Alk Phos 78   ALT 16   AST 14   Bilirubin 0.40   Total Protein 7.1   Albumin 4.0   GFR Non- 33(!)   GFR  40(!)   GFR Comment      [AO]   0600 INR: 1.1 [AO]   0600 PTT: 25.0 [AO]   0607 Urinalysis with Microscopic(!):    Color, UA Red(!)   Turbidity UA Cloudy(!)   Glucose, UA TRACE(!)   Bilirubin, Urine NEGATIVE   Ketones, Urine NEGATIVE   Specific [place], UA 1.025   Urine Hgb 3+(!)   pH, UA 7.5   Protein, UA 3+(!)   Urobilinogen, Urine Normal   Nitrite, Urine POSITIVE(!)   Leukocyte Esterase, Urine NEGATIVE   -        WBC, UA 5 TO 10   RBC, UA TOO NUMEROUS TO COUNT   Epithelial Cells, UA 0 TO 2 [AO]   0608 CT ABDOMEN PELVIS WO CONTRAST Additional Contrast? None [AO]   4858 Intermed accepted  [AO]      ED Course User Index  [AO] Luban Portillo 1721, DO       MDM  Number of Diagnoses or Management Options  JOSE EDUARDO (acute kidney injury) ([facility] [address]  Hemorrhagic cystitis  Diagnosis management comments: Spoke to urology. We have already placed 1 catheter and on manual irrigation unsuccessfully then placed the largest catheter we have not on continuous irrigation which was also unsuccessful. Severe JOSE EDUARDO. Nitrite positive UTI on CT scan with hemorrhagic cystitis. Given 1 dose of IV Rocephin, plan for admission to medicine. Pain difficult to control, multiple doses of IV Dilaudid here. Hypertension and tachycardia likely secondary to pain. No fever.        Amount and/or Complexity of Data Reviewed  Clinical lab tests: ordered and reviewed  Tests in the radiology section of CPT®: ordered and reviewed  Review and summarize past medical records: yes  Discuss the patient with other providers: yes  Independent visualization of images, tracings, or specimens: yes    Patient Progress  Patient progress: stable      PROCEDURES:  Procedures     CONSULTS:  IP CONSULT TO UROLOGY  IP CONSULT TO HOSPITALIST  IP CONSULT TO UROLOGY    CRITICAL CARE:  NONE    FINAL IMPRESSION     1. Hemorrhagic cystitis    2. JOSE EDUARDO (acute kidney injury) ([facility] [address]       DISPOSITION / PLAN   DISPOSITION Admitted 05/05/2022 06:31:11 AM      Evaluation and treatment course in the ED, and plan of care upon discharge was discussed in length with the patient. Patient had no further questions prior to being discharged and was instructed to return to the ED for new or worsening symptoms. Any changes to existing medications or new prescriptions were reviewed with patient and they expressed understanding of how to correctly take their medications and the possible side effects. PATIENT REFERRED TO:  No follow-up provider specified. DISCHARGE MEDICATIONS:  New Prescriptions    No medications on file       Kimberly Roland,   Emergency Medicine Physician    (Please note that portions of this note were completed with a voice recognition program.  Efforts were made to edit the dictations but occasionally words are mis-transcribed.)        Lubna Portillo 1721DO  05/05/22 9356

## 2022-05-05 NOTE — ED NOTES
Patient found kneeling in waiting room and in significant amount of pain, states that it feels like he is going to pass out. Patient reports that he had procedure a month ago to remove bladder stones. Bleeding and pain started last night at 11pm. Patient states significant amount of bleeding when trying to urinate.      Myriam Helm RN  05/05/22 7679

## 2022-05-05 NOTE — CONSULTS
Rivka Guzman MD [facility]    Urology Consultation    Patient:  Conrad Babcock  MRN: [id number]  YOB: 1955    CHIEF COMPLAINT:  gross hematuria     HISTORY OF PRESENT ILLNESS:   The patient is a 78 y.o. male who presents with:  Gross Hematuria:  Patient is here today for gross hematuria which occurred 1-2 day(s) ago. Since last office visit the patient's gross hematuria is: no change. Previous imaging results:  CT-scan of abdomen and pelvis shows emphysematous cystitis, clot retention    Summary of old records:   Gross hematuria with smoking hx: 3/2/22 CT urogram: BPH and multiple bladder calculi (up to 8 mm), 3/17/22 cysto: trilobar BPH  BPH: alfuzosin 10 mg po qd 2/17/22; added finasteride 5 mg qd 3/17/22; 4/6/22 Robotic assisted (Kyle Townsend) suprapubic prostatectomy   Nocturia x 3  Elevated PSA pf 5.02 on 1/21/22; 3/17/22 bx negative (104 mL)  Penile glans irritation  Patient's old records, notes and chart reviewed and summarized above.     Past Medical History:    Past Medical History:   Diagnosis Date    Arthritis     Back pain     Hemorrhoids     High cholesterol     History of tinnitus     right ear    Prediabetes     no RX    Stomach ulcer        Past Surgical History:    Past Surgical History:   Procedure Laterality Date    COLONOSCOPY N/A 03/10/2022     COLORECTAL CANCER SCREENING, NOT HIGH RISK (N/A )    COLONOSCOPY N/A 3/10/2022    COLONOSCOPY POLYPECTOMY HOT BIOPSY performed by Lalo Willoughby DO at [address] W/BIOPSY SINGLE/MULTIPLE N/A 4/11/2018    COLONOSCOPY WITH BIOPSY performed by Kevin Gonzalez MD at [address]  04/06/2022    SUPRAPUBIC PROSTATECTOMY LAPAROSCOPIC ROBOTIC     PROSTATECTOMY N/A 4/6/2022    SUPRAPUBIC PROSTATECTOMY LAPAROSCOPIC ROBOTIC performed by Husam Hernandez MD at [address],9+J/P,XJYWN N/A 1/1/5863    OPEN UMBILICAL HERNIA REPAIR performed by Kevin Gonzalez MD at [facility]    TONSILLECTOMY AND ADENOIDECTOMY      UMBILICAL HERNIA REPAIR  2018    open     Previous  surgery: see above     Medications:    Scheduled Meds:   tamsulosin  0.4 mg Oral Daily    finasteride  5 mg Oral Daily    sodium chloride flush  5-40 mL IntraVENous 2 times per day    [Held by provider] enoxaparin  30 mg SubCUTAneous BID    [START ON 2022] cefTRIAXone (ROCEPHIN) IV  1,000 mg IntraVENous Q24H     Continuous Infusions:   sodium chloride       PRN Meds:.phenazopyridine, sodium chloride flush, sodium chloride, ondansetron **OR** ondansetron, acetaminophen **OR** acetaminophen, polyethylene glycol, HYDROmorphone    Allergies:  Patient has no known allergies. Social History:    Social History     Socioeconomic History    Marital status:      Spouse name: Not on file    Number of children: Not on file    Years of education: Not on file    Highest education level: Not on file   Occupational History    Not on file   Tobacco Use    Smoking status: Former Smoker     Packs/day: 1.00     Years: 10.00     Pack years: 10.00     Quit date: 1990     Years since quittin.3    Smokeless tobacco: Never Used   Vaping Use    Vaping Use: Never used   Substance and Sexual Activity    Alcohol use: No    Drug use: No    Sexual activity: Yes     Partners: Female   Other Topics Concern    Not on file   Social History Narrative    Not on file     Social Determinants of Health     Financial Resource Strain:     Difficulty of Paying Living Expenses: Not on file   Food Insecurity:     Worried About [address] in the Last Year: Not on file    Sri of Food in the Last Year: Not on file   Transportation Needs:     Lack of Transportation (Medical): Not on file    Lack of Transportation (Non-Medical):  Not on file   Physical Activity:     Days of Exercise per Week: Not on file    Minutes of Exercise per Session: Not on file   Stress:     Feeling of Stress : Not on file Social Connections:     Frequency of Communication with Friends and Family: Not on file    Frequency of Social Gatherings with Friends and Family: Not on file    Attends Jew Services: Not on file    Active Member of Clubs or Organizations: Not on file    Attends Club or Organization Meetings: Not on file    Marital Status: Not on file   Intimate Partner Violence:     Fear of Current or Ex-Partner: Not on file    Emotionally Abused: Not on file    Physically Abused: Not on file    Sexually Abused: Not on file   Housing Stability:     Unable to Pay for Housing in the Last Year: Not on file    Number of [place] in the Last Year: Not on file    Unstable Housing in the Last Year: Not on file     Family History:    Family History   Problem Relation Age of Onset    No Known Problems Mother     Cancer Father     Alcohol Abuse Father     No Known Problems Sister     No Known Problems Sister     No Known Problems Sister      Previous Urologic Family history: none    REVIEW OF SYSTEMS:  Constitutional: positive for  anorexia  Eyes: negative  Respiratory: negative  Cardiovascular: negative  Gastrointestinal: Positive for nausea and lower abdominal pain  Genitourinary: see HPI  Musculoskeletal: negative  Skin: negative   Neurological: negative  Hematological/Lymphatic: negative  Psychological: negative    Physical Exam:    This a 78 y.o. male   Patient Vitals for the past 24 hrs:   BP Temp Pulse Resp SpO2 Height Weight   05/05/22 0758 132/75 -- 82 18 96 % -- --   05/05/22 0206 (!) 199/121 98.1 °F (36.7 °C) 127 24 97 % 6' (1.829 m) 274 lb (124.3 kg)     Constitutional: Patient in no acute distress; Neuro: alert and oriented to person place and time. Psych: Mood and affect normal.  Skin: Normal  Lungs: Respiratory effort normal  Cardiovascular:  Normal peripheral pulses  Abdomen: Soft, non-tender, non-distended with no CVA, flank pain, hepatosplenomegaly or hernia.   Kidneys normal.  Bladder tender and distended. Lymphatics: no palpable lymphadenopathy  Penis normal and circumcised with boykin in place  Urethral meatus normal  Recent Labs     05/05/22  0356   WBC 11.6*   HGB 12.9*   HCT 40.9   MCV 90.9        Recent Labs     05/05/22  0534      K 4.0      CO2 19*   BUN 27*   CREATININE 2.02*     Lab Results   Component Value Date    PSA 5.02 (H) 01/21/2022    PSA 3.66 12/05/2017       Additional Lab/culture results:    Urinalysis:   Recent Labs     05/05/22  0430   COLORU Red*   PHUR 7.5   WBCUA 5 TO 10   RBCUA TOO NUMEROUS TO COUNT   SPECGRAV 1.025   LEUKOCYTESUR NEGATIVE   UROBILINOGEN Normal   BILIRUBINUR NEGATIVE        -----------------------------------------------------------------  Imaging Results: 5/5/22 CT-scan of abdomen and pelvis independently reviewed and radiology report verified demonstrating:     Findings consistent with severe emphysematous cystitis.  The bladder appears   filled with clot and hemorrhage.       Bilateral mildly atrophic kidneys with multiple bilateral parapelvic cysts,   stable. Assessment and Plan   Impression:    Patient Active Problem List   Diagnosis    Morbid (severe) obesity due to excess calories ([address]    Umbilical hernia without obstruction or gangrene    Epigastric pain    Elevated PSA    Gross hematuria    BPH with obstruction/lower urinary tract symptoms    Bladder calculi    BPH with urinary obstruction    Acute cystitis with hematuria    JOSE EDUARDO (acute kidney injury) ([address]    High cholesterol       Plan:   The patient presents with acute urinary retention due to gross hematuria and clot retention. Patient's CT consistent with clot retention and emphysematous cystitis. Patient still having a lot of pain despite 26F 3way indwelling boykin catheter but this was found to be malpositioned and once it was pushed into the bladder the distended bladder drained and patient's pain resolved.   All clots irrigated from bladder and patient re-started on moderate continuous bladder irrigation. Continue IV antibiotics pending urine C&S results. Observe overnight and wean off continuous bladder irrigation in the morning. Thank you for allowing me to participate in the care of this patient. Feel free to contact me at [phone] if you have any questions or concerns.      Michelle Snow MD  12:21 PM 5/5/2022

## 2022-05-05 NOTE — ED NOTES
Continuous bladder irrigation started. Catheter appears to be clogged. Hand irrigated without improvement. Plan to remove 20FR 3way and place larger size.         Alannah Dempsey RN  05/05/22 9999

## 2022-05-05 NOTE — ED NOTES
ED to inpatient nurses report     Chief Complaint   Patient presents with    Other     Pt states he had surgery to remove bladder stones one month ago and has been passing clots but today is having difficulty voiding    Abdominal Pain      Present to ED from home  LOC: alert and orientated to name, place, date  Vital signs   Vitals:    05/05/22 0206 05/05/22 0758   BP: (!) 199/121 132/75   Pulse: 127 82   Resp: 24 18   Temp: 98.1 °F (36.7 °C)    SpO2: 97% 96%   Weight: 274 lb (124.3 kg)    Height: 6' (1.829 m)       Oxygen Baseline none    Current needs required pain control   LDAs:   Peripheral IV 05/05/22 Right; Anterior Forearm (Active)   Site Assessment Clean, dry & intact 05/05/22 0406   Line Status Blood return noted; Flushed;Normal saline locked 05/05/22 0406   Dressing Status New dressing applied;Clean, dry & intact 05/05/22 0406       Peripheral IV 05/05/22 Right Forearm (Active)   Site Assessment Clean, dry & intact 05/05/22 0646   Line Status Blood return noted;Brisk blood return 05/05/22 0646   Phlebitis Assessment No symptoms 05/05/22 0646   Infiltration Assessment 0 05/05/22 0646     Mobility: Independent  Pending ED orders: none  Present condition: stable Code Status: full  Consults: IP CONSULT TO UROLOGY  IP CONSULT TO HOSPITALIST  IP CONSULT TO UROLOGY  [x]  Hospitalist  Completed  [x] yes [] no Who:   []  Medicine  Completed  [] yes [] No Who:   []  Cardiology  Completed  [] yes [] No Who:   []  GI   Completed  [] yes [] No Who:   []  Neurology  Completed  [] yes [] No Who:   []  Nephrology Completed  [] yes [] No Who:    []  Vascular  Completed  [] yes [] No Who:   []  Ortho  Completed  [] yes [] No Who:     []  Surgery  Completed  [] yes [] No Who:    [x]  Urology  Completed  [x] yes [] No Who:    []  CT Surgery Completed  [] yes [] No Who:    []  Other    Completed  [] yes [] No Who:  Interventions: bladder irrigation clotted even with 26 [place]  Important Events: He is going to go to surgery for bladder stones and clot removal.        Electronically signed by Sridevi Mayo RN on 5/5/2022 at 8:25 AM       Zoila Bedolla RN  05/05/22 8077

## 2022-05-06 LAB
ABSOLUTE EOS #: 0.11 K/UL (ref 0–0.44)
ABSOLUTE IMMATURE GRANULOCYTE: 0.03 K/UL (ref 0–0.3)
ABSOLUTE LYMPH #: 1.26 K/UL (ref 1.1–3.7)
ABSOLUTE MONO #: 0.78 K/UL (ref 0.1–1.2)
ANION GAP SERPL CALCULATED.3IONS-SCNC: 9 MMOL/L (ref 9–17)
BASOPHILS # BLD: 0 % (ref 0–2)
BASOPHILS ABSOLUTE: <0.03 K/UL (ref 0–0.2)
BUN BLDV-MCNC: 21 MG/DL (ref 8–23)
BUN/CREAT BLD: 19 (ref 9–20)
CALCIUM SERPL-MCNC: 8.4 MG/DL (ref 8.6–10.4)
CHLORIDE BLD-SCNC: 101 MMOL/L (ref 98–107)
CO2: 26 MMOL/L (ref 20–31)
CREAT SERPL-MCNC: 1.1 MG/DL (ref 0.7–1.2)
CULTURE: NO GROWTH
EOSINOPHILS RELATIVE PERCENT: 1 % (ref 1–4)
ESTIMATED AVERAGE GLUCOSE: 114 MG/DL
GFR AFRICAN AMERICAN: >60 ML/MIN
GFR NON-AFRICAN AMERICAN: >60 ML/MIN
GFR SERPL CREATININE-BSD FRML MDRD: ABNORMAL ML/MIN/{1.73_M2}
GLUCOSE BLD-MCNC: 129 MG/DL (ref 70–99)
HBA1C MFR BLD: 5.6 % (ref 4–6)
HCT VFR BLD CALC: 34.1 % (ref 40.7–50.3)
HCT VFR BLD CALC: 34.9 % (ref 40.7–50.3)
HEMOGLOBIN: 10.7 G/DL (ref 13–17)
HEMOGLOBIN: 10.9 G/DL (ref 13–17)
IMMATURE GRANULOCYTES: 0 %
LYMPHOCYTES # BLD: 15 % (ref 24–43)
MCH RBC QN AUTO: 28.6 PG (ref 25.2–33.5)
MCHC RBC AUTO-ENTMCNC: 31.2 G/DL (ref 28.4–34.8)
MCV RBC AUTO: 91.6 FL (ref 82.6–102.9)
MONOCYTES # BLD: 9 % (ref 3–12)
NRBC AUTOMATED: 0 PER 100 WBC
PDW BLD-RTO: 13.3 % (ref 11.8–14.4)
PLATELET # BLD: 192 K/UL (ref 138–453)
PMV BLD AUTO: 9.1 FL (ref 8.1–13.5)
POTASSIUM SERPL-SCNC: 4 MMOL/L (ref 3.7–5.3)
RBC # BLD: 3.81 M/UL (ref 4.21–5.77)
SEG NEUTROPHILS: 75 % (ref 36–65)
SEGMENTED NEUTROPHILS ABSOLUTE COUNT: 6.26 K/UL (ref 1.5–8.1)
SODIUM BLD-SCNC: 136 MMOL/L (ref 135–144)
SPECIMEN DESCRIPTION: NORMAL
WBC # BLD: 8.5 K/UL (ref 3.5–11.3)

## 2022-05-06 PROCEDURE — 85014 HEMATOCRIT: CPT

## 2022-05-06 PROCEDURE — 6370000000 HC RX 637 (ALT 250 FOR IP): Performed by: NURSE PRACTITIONER

## 2022-05-06 PROCEDURE — 99232 SBSQ HOSP IP/OBS MODERATE 35: CPT | Performed by: NURSE PRACTITIONER

## 2022-05-06 PROCEDURE — 6370000000 HC RX 637 (ALT 250 FOR IP): Performed by: SPECIALIST

## 2022-05-06 PROCEDURE — 2580000003 HC RX 258: Performed by: NURSE PRACTITIONER

## 2022-05-06 PROCEDURE — APPSS45 APP SPLIT SHARED TIME 31-45 MINUTES: Performed by: NURSE PRACTITIONER

## 2022-05-06 PROCEDURE — 85025 COMPLETE CBC W/AUTO DIFF WBC: CPT

## 2022-05-06 PROCEDURE — 6360000002 HC RX W HCPCS: Performed by: NURSE PRACTITIONER

## 2022-05-06 PROCEDURE — 80048 BASIC METABOLIC PNL TOTAL CA: CPT

## 2022-05-06 PROCEDURE — 1200000000 HC SEMI PRIVATE

## 2022-05-06 PROCEDURE — 51700 IRRIGATION OF BLADDER: CPT

## 2022-05-06 PROCEDURE — 36415 COLL VENOUS BLD VENIPUNCTURE: CPT

## 2022-05-06 PROCEDURE — 83036 HEMOGLOBIN GLYCOSYLATED A1C: CPT

## 2022-05-06 PROCEDURE — 85018 HEMOGLOBIN: CPT

## 2022-05-06 RX ORDER — TROSPIUM CHLORIDE 20 MG/1
20 TABLET, FILM COATED ORAL
Status: DISCONTINUED | OUTPATIENT
Start: 2022-05-06 | End: 2022-05-08 | Stop reason: HOSPADM

## 2022-05-06 RX ORDER — ATROPA BELLADONNA AND OPIUM 16.2; 6 MG/1; MG/1
60 SUPPOSITORY RECTAL EVERY 8 HOURS PRN
Status: DISCONTINUED | OUTPATIENT
Start: 2022-05-06 | End: 2022-05-08 | Stop reason: HOSPADM

## 2022-05-06 RX ADMIN — TAMSULOSIN HYDROCHLORIDE 0.4 MG: 0.4 CAPSULE ORAL at 22:06

## 2022-05-06 RX ADMIN — SODIUM CHLORIDE: 9 INJECTION, SOLUTION INTRAVENOUS at 05:44

## 2022-05-06 RX ADMIN — TROSPIUM CHLORIDE 20 MG: 20 TABLET, FILM COATED ORAL at 12:14

## 2022-05-06 RX ADMIN — ATROPA BELLADONNA AND OPIUM 60 MG: 16.2; 6 SUPPOSITORY RECTAL at 13:50

## 2022-05-06 RX ADMIN — HYDROMORPHONE HYDROCHLORIDE 1 MG: 1 INJECTION, SOLUTION INTRAMUSCULAR; INTRAVENOUS; SUBCUTANEOUS at 10:31

## 2022-05-06 RX ADMIN — SODIUM CHLORIDE, PRESERVATIVE FREE 10 ML: 5 INJECTION INTRAVENOUS at 08:27

## 2022-05-06 RX ADMIN — TROSPIUM CHLORIDE 20 MG: 20 TABLET, FILM COATED ORAL at 17:03

## 2022-05-06 RX ADMIN — FINASTERIDE 5 MG: 5 TABLET, FILM COATED ORAL at 22:06

## 2022-05-06 RX ADMIN — CEFTRIAXONE SODIUM 1000 MG: 1 INJECTION, POWDER, FOR SOLUTION INTRAMUSCULAR; INTRAVENOUS at 05:45

## 2022-05-06 RX ADMIN — SODIUM CHLORIDE, PRESERVATIVE FREE 20 ML: 5 INJECTION INTRAVENOUS at 22:06

## 2022-05-06 ASSESSMENT — PAIN SCALES - GENERAL
PAINLEVEL_OUTOF10: 3
PAINLEVEL_OUTOF10: 4

## 2022-05-06 NOTE — PROGRESS NOTES
Physician Progress Note      Halie PRATER #:                  [id number]  :                       1955  ADMIT DATE:       2022 3:37 AM  DISCH DATE:  RESPONDING  PROVIDER #:        Michelle Urban MD          QUERY TEXT:    Pt admitted with Acute cystitis with hematuria and underwent robotic assisted   suprapubic prostatectomy and cystolithotomy on 22. .? If possible, please   document in progress notes and discharge summary:    The medical record reflects the following:  Risk Factors: robotic assisted suprapubic prostatectomy and cystolithotomy on   22  Clinical Indicators: CT: severe emphysematous cystitis. The bladder appears   filled with clot and hemorrhage. UA cloudy, + Nitrate, WBC 5-10, RBC too   numerous. Treatment: 26F 3way indwelling Maldonado catheter, bladder irrigation, IV   Rocephin, Proscar, B&O, pyridium, Flomax  Options provided:  -- Acute cystitis with hematuria?is a?postoperative complication  -- Acute cystitis with hematuria is not a postoperative complication, but is   due to other incidental risk factor, Please specify other incidental risk   factor. -- Other - I will add my own diagnosis  -- Disagree - Not applicable / Not valid  -- Disagree - Clinically unable to determine / Unknown  -- Refer to Clinical Documentation Reviewer    PROVIDER RESPONSE TEXT:    Patient has Acute cystitis with hematuria as a?postoperative complication.     Query created by: Yenny Crawford on 2022 7:55 AM      Electronically signed by:  Michelle Urban MD 2022 9:52 AM

## 2022-05-06 NOTE — PROGRESS NOTES
[facility]  Office: [address], DO, Caryl Hale, DO, Gretchen Calin, DO, Brandon Conway Arsh, DO, Latrice Wilson MD, Katya Serrano MD, Krista Ross MD, Larissa Vuong MD, Doyle Orosco MD, Aric Carrillo MD, Clarisse Wolfe MD, Raul Mckinley, DO, Kathleen Callaway, DO, Kraig Georges MD,  Harshal Rick DO, Vidhi Law MD, Carin Roberts MD, Jessica Abdi MD, Howie Yuan DO, Joli Angelucci, MD, Fredy Stout MD, Angelica Roger MD, Randolph Bui, [facility], [facility], [facility], Westley Mora, CNP, Di Garcia, CNP, Bertha Hastings, CNP, Zita Norwood, CNP, TONIE HessC, Kortney Miller, DNP, Dinorah Le, CNP, Adam Zhu, CNP, Deann Denis, CNP, Lucio Loza, CNS, Shakilaolygray Nails, DNP, Carl Clapper, CNP, Rolf Proper, CNP, Eliza Serum, [facility]    Progress Note    5/6/2022    8:36 AM    Name:   Barbara Medrano  MRN:     [id number]     Acct:      [de-identified]   Room:   2009/2009-02  IP Day:  1  Admit Date:  5/5/2022  3:37 AM    PCP:   SHELDON Obrien CNP  Code Status:  Full Code    Subjective:     C/C:   Chief Complaint   Patient presents with   [place] Other     Pt states he had surgery to remove bladder stones one month ago and has been passing clots but today is having difficulty voiding    Abdominal Pain     Interval History Status: significantly improved. Patient reports significant improvement in his suprapubic and flank pain. His Maldonado catheter remains connected to three-way irrigation. Drainage is bright pink without clots. We await urology input on management. Patient's blood pressure is better controlled today. Blood glucose has also improved. Patient reports that he is a prediabetic and is not currently receiving any treatment. We will check an A1c and recommend outpatient follow-up with PCP for continued surveillance and treatment.   Following urology recommendations we will determine the appropriateness and necessity of continued hospitalization versus outpatient management. Patient did experience a 2 g drop in his hemoglobin overnight. We will continue to monitor hemoglobin as there is still concern for active hemorrhage. Brief History:     5/5 - Gerri Bell is a 78 y.o.   male with a history of BPH with urinary obstruction who presents with suprapubic pain and pressure, difficulty urinating and hematuria  and is admitted to the hospital for the management of Acute cystitis with hematuria. Patient recently had robotic assisted suprapubic prostatectomy and cystolithotomy by Dr. Estelle Kunz on 4/6/22. He states he was sent home with bactrim and was doing well at home until fairly recently. He followed up for his post-op visit  On 4/28 and was having similar symptoms at that time. He states he was started on antibiotics but UA was negative for infection and they were stopped. He states his symptoms have gotten progressively worse since then. His pain became quite severe last night. He denies any fever, back pain, chills or changes in his bowel habits. He is not on any blood thinners. Maldonado catheter placement with bladder irrigation attempted in ED unsuccessful due to blood clots despite manual irrigation. 5/6 -three-way bladder irrigation has alleviated clots, return and still bright pink. Pain is resolved. Blood pressure better controlled today. Await A1c results and recommend outpatient follow-up for possible prediabetic/type 2 diabetes.     Review of Systems:     Constitutional:  negative for chills, fevers, sweats  Respiratory:  negative for cough, dyspnea on exertion, shortness of breath, wheezing  Cardiovascular:  negative for chest pain, chest pressure/discomfort, lower extremity edema, palpitations  Gastrointestinal:  negative for abdominal pain, constipation, diarrhea, nausea, vomiting  Neurological:  negative for dizziness, headache    Medications: Allergies:  No Known Allergies    Current Meds:   Scheduled Meds:    tamsulosin  0.4 mg Oral Daily    finasteride  5 mg Oral Daily    sodium chloride flush  5-40 mL IntraVENous 2 times per day    [Held by provider] enoxaparin  30 mg SubCUTAneous BID    cefTRIAXone (ROCEPHIN) IV  1,000 mg IntraVENous Q24H     Continuous Infusions:    sodium chloride 10 mL/hr at 22 0544     PRN Meds: phenazopyridine, sodium chloride flush, sodium chloride, ondansetron **OR** ondansetron, acetaminophen **OR** acetaminophen, polyethylene glycol, HYDROmorphone    Data:     Past Medical History:   has a past medical history of Arthritis, Back pain, Hemorrhoids, High cholesterol, History of tinnitus, Prediabetes, and Stomach ulcer. Social History:   reports that he quit smoking about 32 years ago. He has a 10.00 pack-year smoking history. He has never used smokeless tobacco. He reports that he does not drink alcohol and does not use drugs. Family History:   Family History   Problem Relation Age of Onset    No Known Problems Mother     Cancer Father     Alcohol Abuse Father     No Known Problems Sister     No Known Problems Sister     No Known Problems Sister        Vitals:  /61   Pulse 74   Temp 97.5 °F (36.4 °C) (Oral)   Resp 16   Ht 6' (1.829 m)   Wt 274 lb (124.3 kg)   SpO2 92%   BMI 37.16 kg/m²   Temp (24hrs), Av.5 °F (36.4 °C), Min:97.2 °F (36.2 °C), Max:97.7 °F (36.5 °C)    No results for input(s): POCGLU in the last 72 hours. I/O (24Hr):     Intake/Output Summary (Last 24 hours) at 2022 0836  Last data filed at 2022 0705  Gross per 24 hour   Intake 780 ml   Output 5900 ml   Net -5120 ml       Labs:  Hematology:  Recent Labs     22  0356 22  0556   WBC 11.6* 8.5   RBC 4.50 3.81*   HGB 12.9* 10.9*   HCT 40.9 34.9*   MCV 90.9 91.6   MCH 28.7 28.6   MCHC 31.5 31.2   RDW 12.9 13.3    192   MPV 9.9 9.1   INR 1.1  --      Chemistry:  Recent Labs     22  0534 05/06/22  0556    136   K 4.0 4.0    101   CO2 19* 26   GLUCOSE 178* 129*   BUN 27* 21   CREATININE 2.02* 1.10   ANIONGAP 16 9   LABGLOM 33* >60   GFRAA 40* >60   CALCIUM 8.8 8.4*     Recent Labs     05/05/22  0534   PROT 7.1   LABALBU 4.0   AST 14   ALT 16   ALKPHOS 78   BILITOT 0.40     ABG:No results found for: POCPH, PHART, PH, POCPCO2, BRC5GBY, PCO2, POCPO2, PO2ART, PO2, POCHCO3, TFT0OHG, HCO3, NBEA, PBEA, BEART, BE, THGBART, THB, EXA9YJV, SHBN1CBA, F0VFCXQQ, O2SAT, FIO2  No results found for: SPECIAL  Lab Results   Component Value Date/Time    CULTURE NO GROWTH 04/28/2022 11:12 PM       Radiology:  CT ABDOMEN PELVIS WO CONTRAST Additional Contrast? None    Result Date: 5/5/2022  Findings consistent with severe emphysematous cystitis. The bladder appears filled with clot and hemorrhage. Bilateral mildly atrophic kidneys with multiple bilateral parapelvic cysts, stable. Physical Examination:        General appearance:  alert, cooperative and no distress  Mental Status:  oriented to person, place and time and normal affect  Lungs:  clear to auscultation bilaterally, normal effort  Heart:  regular rate and rhythm, no murmur  Abdomen:  soft, nontender, nondistended, normal bowel sounds, no masses, hepatomegaly, splenomegaly. Three-way bladder irrigation catheter remains in place. Urine is without clots but remains a deep pink. Extremities:  no edema, redness, tenderness in the calves  Skin:  no gross lesions, rashes, induration    Assessment:        Hospital Problems           Last Modified POA    * (Principal) Acute cystitis with hematuria 5/5/2022 Yes    JOSE EDUARDO (acute kidney injury) ([facility] [address] 5/5/2022 Yes    BPH with urinary obstruction 5/5/2022 Yes          Plan:        1. Acute cystitis with hematuria secondary to BPH with urinary outlet obstruction resulting in acute kidney injury  1. Continue Rocephin, await final cultures  2. Continue bladder irrigation, hematuria still present  3.  Await input from urology on hospital course versus outpatient management  4. Continue IV hydration, kidney injury has resolved following appropriate bladder drainage and hydration  5. Continue to monitor hemoglobin, 2 gram drop overnight, 10.9 this morning  2. Prediabetic  1. Blood sugars better controlled today  2.  Check A1c, recommend outpatient follow-up with PCP for continued glucose monitoring and possible treatment for diabetes if required    SHELDON Faulkner NP  5/6/2022  8:36 AM

## 2022-05-06 NOTE — PROGRESS NOTES
Daisy Gutierrez MD FACS    Urology Progress Note    Subjective: Patient had multiple blood clots removed with manual irrigation and now urine light pink. Patient Vitals for the past 24 hrs:   BP Temp Temp src Pulse Resp SpO2   05/06/22 0703 131/61 97.5 °F (36.4 °C) Oral 74 16 92 %   05/05/22 2022 (!) 142/66 97.7 °F (36.5 °C) Oral 82 18 93 %   05/05/22 1536 (!) 141/82 97.2 °F (36.2 °C) Oral 76 18 96 %       Intake/Output Summary (Last 24 hours) at 5/6/2022 1100  Last data filed at 5/6/2022 4434  Gross per 24 hour   Intake 780 ml   Output 5900 ml   Net -5120 ml       Recent Labs     05/05/22  0356 05/06/22  0556   WBC 11.6* 8.5   HGB 12.9* 10.9*   HCT 40.9 34.9*   MCV 90.9 91.6    192     Recent Labs     05/05/22  0534 05/06/22  0556    136   K 4.0 4.0    101   CO2 19* 26   BUN 27* 21   CREATININE 2.02* 1.10       Recent Labs     05/05/22  0430   COLORU Red*   PHUR 7.5   WBCUA 5 TO 10   RBCUA TOO NUMEROUS TO COUNT   SPECGRAV 1.025   LEUKOCYTESUR NEGATIVE   UROBILINOGEN Normal   BILIRUBINUR NEGATIVE       Additional Lab/culture results:    Physical Exam: Urine light pink on CBI    Interval Imaging Findings:    Impression:    Patient Active Problem List   Diagnosis    Morbid (severe) obesity due to excess calories ([address]    Umbilical hernia without obstruction or gangrene    Epigastric pain    Elevated PSA    Gross hematuria    BPH with obstruction/lower urinary tract symptoms    Bladder calculi    BPH with urinary obstruction    Acute cystitis with hematuria    JOSE EDUARDO (acute kidney injury) ([address]    High cholesterol       Plan:   Patient had multiple blood clots removed with manual irrigation and now urine light pink. Retained clots are the reason urine was still bloody despite continuous bladder irrigation. All clots removed today at 1100 and will run continuous bladder irrigation wide open until 1400.   Nursing staff instructed to irrigate catheter manually again to remove any remaining clots and if clear to begin weaning continuous bladder irrigation. Do not believe there is significant ongoing bleeding at this point in time. Continue IV antibiotics. B&O suppositories for bladder spasms. Will also begin Trospium 20 mg po bid for bladder spasms. Thank you for allowing me to participate in the care of this patient. Feel free to contact me at [phone] if you have any questions or concerns.      Nguyen Lyle MD  11:00 AM 5/6/2022

## 2022-05-06 NOTE — PLAN OF CARE
Problem: Discharge Planning  Goal: Discharge to home or other facility with appropriate resources  Outcome: Progressing    Problem: Pain  Goal: Verbalizes/displays adequate comfort level or baseline comfort level  Outcome: Progressing     Problem: Genitourinary - Adult  Goal: Absence of urinary retention  Outcome: Progressing  Goal: Urinary catheter remains patent  Outcome: Progressing     Problem: Infection - Adult  Goal: Absence of infection at discharge  Outcome: Progressing  Goal: Absence of infection during hospitalization  Outcome: Progressing  Goal: Absence of fever/infection during anticipated neutropenic period  Outcome: Progressing     Problem: Metabolic/Fluid and Electrolytes - Adult  Goal: Electrolytes maintained within normal limits  Outcome: Progressing  Goal: Hemodynamic stability and optimal renal function maintained  Outcome: Progressing  Goal: Glucose maintained within prescribed range  Outcome: Progressing     Problem: Hematologic - Adult  Goal: Maintains hematologic stability  Outcome: Progressing

## 2022-05-06 NOTE — FLOWSHEET NOTE
Patient is awake and alert as he sits up in the bed while reading a book. Patient is approachable and engages in conversation. Patient reports that his spouse has just left. Patient denies any spiritual or emotional concerns. Patient states that he is doing well and is waiting for urology to figure out what is wrong. Patient appears to be coping adequately and expresses appreciation for the visit. Spiritual Care will follow as needed.        05/06/22 1044   Encounter Summary   Service Provided For: Patient   Referral/Consult From: [facility]   Support System Spouse   Last Encounter  05/06/22   Complexity of Encounter Low   Begin Time 1000   End Time  1015   Total Time Calculated 15 min   Encounter    Type Initial Screen/Assessment   Assessment/Intervention/Outcome   Assessment Calm   Intervention Active listening;Nurtured Hope;Sustaining Presence/Ministry of presence   Outcome Expressed Gratitude

## 2022-05-06 NOTE — PLAN OF CARE
Problem: Discharge Planning  Goal: Discharge to home or other facility with appropriate resources  5/6/2022 1540 by Ronni Blum RN  Outcome: Progressing  5/6/2022 0403 by Rohit Jacobson RN  Outcome: Progressing  Flowsheets (Taken 5/5/2022 1559 by Lukasz Thompson RN)  Discharge to home or other facility with appropriate resources:   Identify barriers to discharge with patient and caregiver   Identify discharge learning needs (meds, wound care, etc)     Problem: Pain  Goal: Verbalizes/displays adequate comfort level or baseline comfort level  5/6/2022 1540 by Ronni Blum RN  Outcome: Progressing  5/6/2022 0403 by Rohit Jacobson RN  Outcome: Progressing     Problem: Genitourinary - Adult  Goal: Absence of urinary retention  5/6/2022 1540 by Ronni Blum RN  Outcome: Progressing  5/6/2022 0403 by Rohit Jacobson RN  Outcome: Progressing  Goal: Urinary catheter remains patent  5/6/2022 1540 by Ronni Blum RN  Outcome: Progressing  5/6/2022 0403 by Rohit Jacobson RN  Outcome: Progressing     Problem: Infection - Adult  Goal: Absence of infection at discharge  5/6/2022 1540 by Ronni Blum RN  Outcome: Progressing  5/6/2022 0403 by Rohit Jacobson RN  Outcome: Progressing  Goal: Absence of infection during hospitalization  5/6/2022 1540 by Ronni Blum RN  Outcome: Progressing  5/6/2022 0403 by Rohit Jacobson RN  Outcome: Progressing  Goal: Absence of fever/infection during anticipated neutropenic period  5/6/2022 1540 by Ronni Blum RN  Outcome: Progressing  5/6/2022 0403 by Rohit Jacobson RN  Outcome: Progressing     Problem: Metabolic/Fluid and Electrolytes - Adult  Goal: Electrolytes maintained within normal limits  5/6/2022 1540 by Ronni Blum RN  Outcome: Progressing  5/6/2022 0403 by Rohit Jacobson RN  Outcome: Progressing  Goal: Hemodynamic stability and optimal renal function maintained  5/6/2022 1540 by Ronni Blum RN  Outcome: Progressing  5/6/2022 0403 by Belen Owens RN  Outcome: Progressing  Goal: Glucose maintained within prescribed range  5/6/2022 1540 by Divine Solis RN  Outcome: Progressing  5/6/2022 0403 by Belen Owens RN  Outcome: Progressing     Problem: Hematologic - Adult  Goal: Maintains hematologic stability  5/6/2022 1540 by Divine Solis RN  Outcome: Progressing  5/6/2022 0403 by Belen Owens RN  Outcome: Progressing     Problem: Safety - Adult  Goal: Free from fall injury  Outcome: Progressing

## 2022-05-06 NOTE — PROGRESS NOTES
[facility] Pharmacy Service   Medication Review    The patient's list of current home medications has been reviewed. Source(s) of information: spoke to patient and sure scripts     Based on information provided by the above source(s), I have updated the patient's home med list as described below. I changed or updated the following medications on the patient's home medication list:  Discontinued (already completed) levoFLOXacin (LEVAQUIN) 500 MG tablet  sulfamethoxazole-trimethoprim (BACTRIM DS) 800-160 MG per tablet     Added None      Adjusted   None     Other Notes None            Please feel free to call me with any questions about this encounter. Thank you. This note will be reviewed and co-signed by the [facility] Pharmacist. The pharmacist will review inpatient orders and contact the physician about any discrepancies. Adwoa Price, pharmacy technician  [facility] Pharmacy Service  Phone:  [phone]  Fax: [phone]      Electronically signed by Adwoa Price on 5/6/2022 at 2:07 PM         Prior to Admission medications    Medication Sig Start Date End Date Taking?  Authorizing Provider   phenazopyridine (PYRIDIUM) 200 MG tablet Take 200 mg by mouth 3 times daily as needed for Pain        finasteride (PROSCAR) 5 MG tablet Take 1 tablet by mouth daily       alfuzosin (UROXATRAL) 10 MG extended release tablet Take 1 tablet by mouth daily

## 2022-05-07 PROBLEM — N17.9 AKI (ACUTE KIDNEY INJURY) (HCC): Status: RESOLVED | Noted: 2022-05-05 | Resolved: 2022-05-07

## 2022-05-07 LAB
ANION GAP SERPL CALCULATED.3IONS-SCNC: 9 MMOL/L (ref 9–17)
BUN BLDV-MCNC: 14 MG/DL (ref 8–23)
BUN/CREAT BLD: 14 (ref 9–20)
CALCIUM SERPL-MCNC: 8.7 MG/DL (ref 8.6–10.4)
CHLORIDE BLD-SCNC: 103 MMOL/L (ref 98–107)
CO2: 25 MMOL/L (ref 20–31)
CREAT SERPL-MCNC: 1 MG/DL (ref 0.7–1.2)
GFR AFRICAN AMERICAN: >60 ML/MIN
GFR NON-AFRICAN AMERICAN: >60 ML/MIN
GFR SERPL CREATININE-BSD FRML MDRD: NORMAL ML/MIN/{1.73_M2}
GLUCOSE BLD-MCNC: 97 MG/DL (ref 70–99)
HCT VFR BLD CALC: 32.8 % (ref 40.7–50.3)
HCT VFR BLD CALC: 34.3 % (ref 40.7–50.3)
HCT VFR BLD CALC: 34.5 % (ref 40.7–50.3)
HEMOGLOBIN: 10.4 G/DL (ref 13–17)
HEMOGLOBIN: 10.9 G/DL (ref 13–17)
HEMOGLOBIN: 11 G/DL (ref 13–17)
POTASSIUM SERPL-SCNC: 4.1 MMOL/L (ref 3.7–5.3)
SODIUM BLD-SCNC: 137 MMOL/L (ref 135–144)

## 2022-05-07 PROCEDURE — 51700 IRRIGATION OF BLADDER: CPT

## 2022-05-07 PROCEDURE — 6360000002 HC RX W HCPCS: Performed by: NURSE PRACTITIONER

## 2022-05-07 PROCEDURE — 6370000000 HC RX 637 (ALT 250 FOR IP): Performed by: SPECIALIST

## 2022-05-07 PROCEDURE — 1200000000 HC SEMI PRIVATE

## 2022-05-07 PROCEDURE — 36415 COLL VENOUS BLD VENIPUNCTURE: CPT

## 2022-05-07 PROCEDURE — 80048 BASIC METABOLIC PNL TOTAL CA: CPT

## 2022-05-07 PROCEDURE — 85014 HEMATOCRIT: CPT

## 2022-05-07 PROCEDURE — 6370000000 HC RX 637 (ALT 250 FOR IP): Performed by: NURSE PRACTITIONER

## 2022-05-07 PROCEDURE — 85018 HEMOGLOBIN: CPT

## 2022-05-07 PROCEDURE — 2580000003 HC RX 258: Performed by: NURSE PRACTITIONER

## 2022-05-07 PROCEDURE — 99232 SBSQ HOSP IP/OBS MODERATE 35: CPT | Performed by: INTERNAL MEDICINE

## 2022-05-07 RX ADMIN — SODIUM CHLORIDE: 9 INJECTION, SOLUTION INTRAVENOUS at 06:46

## 2022-05-07 RX ADMIN — CEFTRIAXONE SODIUM 1000 MG: 1 INJECTION, POWDER, FOR SOLUTION INTRAMUSCULAR; INTRAVENOUS at 06:47

## 2022-05-07 RX ADMIN — HYDROMORPHONE HYDROCHLORIDE 1 MG: 1 INJECTION, SOLUTION INTRAMUSCULAR; INTRAVENOUS; SUBCUTANEOUS at 21:16

## 2022-05-07 RX ADMIN — SODIUM CHLORIDE, PRESERVATIVE FREE 10 ML: 5 INJECTION INTRAVENOUS at 16:25

## 2022-05-07 RX ADMIN — TAMSULOSIN HYDROCHLORIDE 0.4 MG: 0.4 CAPSULE ORAL at 21:16

## 2022-05-07 RX ADMIN — TROSPIUM CHLORIDE 20 MG: 20 TABLET, FILM COATED ORAL at 06:50

## 2022-05-07 RX ADMIN — FINASTERIDE 5 MG: 5 TABLET, FILM COATED ORAL at 21:16

## 2022-05-07 RX ADMIN — HYDROMORPHONE HYDROCHLORIDE 1 MG: 1 INJECTION, SOLUTION INTRAMUSCULAR; INTRAVENOUS; SUBCUTANEOUS at 04:33

## 2022-05-07 RX ADMIN — TROSPIUM CHLORIDE 20 MG: 20 TABLET, FILM COATED ORAL at 16:27

## 2022-05-07 RX ADMIN — HYDROMORPHONE HYDROCHLORIDE 1 MG: 1 INJECTION, SOLUTION INTRAMUSCULAR; INTRAVENOUS; SUBCUTANEOUS at 23:33

## 2022-05-07 RX ADMIN — SODIUM CHLORIDE, PRESERVATIVE FREE 10 ML: 5 INJECTION INTRAVENOUS at 21:20

## 2022-05-07 ASSESSMENT — PAIN DESCRIPTION - ORIENTATION: ORIENTATION: LOWER

## 2022-05-07 NOTE — PROGRESS NOTES
[facility]  Office: [address], DO, Peyton Simple, DO, Javier Brown, DO, Anusha Vargas Blood, DO, Franky Vitale MD, Rivka Simpson MD, Laura Phillips MD, Viridiana David MD, Hortencia Beckett MD, Alexis Stoner MD, Tomy Edge MD, Cristine Kitchen, DO, Catarina Anand, DO, Liban Michel MD,  Flor Chauhan, DO, Basilia Wiggins MD, Alexx Soliz MD, Greg Guajardo MD, Vanessa Wheatley, DO, Ash Petit MD, Trevor Duff MD, Guy Randall MD, Aliyah Padilla, [facility], [facility], [facility], Jenn Baker CNP, Marcia Donaldson, CNP, Rc Diop, CNP, Celina Haji, CNP, Otis Pierre PA-C, Abraham Jiang, [facility], Narayan Zamudio, CNP, Dannie Torres CNP, Rory Schmid, CNP, Lacy Friedman, CNS, Katie Orosco, [facility], Slime Ward, CNP, Jessica Denis, CNP, Dameon Gregory, [facility]    Progress Note    5/7/2022    1:38 PM    Name:   Carlin Dela Cruz  MRN:     [id number]     Acct:      [de-identified]   Room:   2009/2009-02  IP Day:  2  Admit Date:  5/5/2022  3:37 AM    PCP:   SHELDON Daly CNP  Code Status:  Full Code    Subjective:     C/C:   Chief Complaint   Patient presents with   [facility] Other     Pt states he had surgery to remove bladder stones one month ago and has been passing clots but today is having difficulty voiding    Abdominal Pain     Interval History Status: not changed. Feels ok  CBI stopped this am    No longer having urgency like before    Urine still red tinged but less so than previously    Brief History:     Per my REYNA:  \"Milan Bell is a 78 y.o.   male with a history of BPH with urinary obstruction who presents with suprapubic pain and pressure, difficulty urinating and hematuria  and is admitted to the hospital for the management of Acute cystitis with hematuria. Patient recently had robotic assisted suprapubic prostatectomy and cystolithotomy by Dr. Carlos Betancur on 4/6/22.  He states he was sent home with bactrim and was doing well at home until fairly recently. He followed up for his post-op visit  On 4/28 and was having similar symptoms at that time. He states he was started on antibiotics but UA was negative for infection and they were stopped. He states his symptoms have gotten progressively worse since then. His pain became quite severe last night. He denies any fever, back pain, chills or changes in his bowel habits. He is not on any blood thinners. Maldonado catheter placement with bladder irrigation attempted in ED unsuccessful due to blood clots despite manual irrigation.      CT abd/pelvis consistent with severe emphysematous cystitis. Bladder appears filled with clot and hemorrhage. Bilateral mildly atrophic kidneys with multiple parapelvic cysts, stable\"    Review of Systems:     Constitutional:  negative for chills, fevers, sweats  Respiratory:  negative for cough, dyspnea on exertion, shortness of breath, wheezing  Cardiovascular:  negative for chest pain, chest pressure/discomfort, lower extremity edema, palpitations  Gastrointestinal:  negative for abdominal pain, constipation, diarrhea, nausea, vomiting  Neurological:  negative for dizziness, headache    Medications: Allergies:  No Known Allergies    Current Meds:   Scheduled Meds:    trospium  20 mg Oral BID AC    tamsulosin  0.4 mg Oral Daily    finasteride  5 mg Oral Daily    sodium chloride flush  5-40 mL IntraVENous 2 times per day     Continuous Infusions:    sodium chloride 75 mL/hr at 05/07/22 0646     PRN Meds: opium-belladonna, phenazopyridine, sodium chloride flush, sodium chloride, ondansetron **OR** ondansetron, acetaminophen **OR** acetaminophen, polyethylene glycol, HYDROmorphone    Data:     Past Medical History:   has a past medical history of JOSE EDUARDO (acute kidney injury) ([facility] [address] Arthritis, Back pain, Hemorrhoids, High cholesterol, History of tinnitus, Prediabetes, and Stomach ulcer.     Social History:   reports that he quit smoking about 32 years ago. He has a 10.00 pack-year smoking history. He has never used smokeless tobacco. He reports that he does not drink alcohol and does not use drugs. Family History:   Family History   Problem Relation Age of Onset    No Known Problems Mother     Cancer Father     Alcohol Abuse Father     No Known Problems Sister     No Known Problems Sister     No Known Problems Sister        Vitals:  BP (!) 110/58   Pulse 79   Temp 98.2 °F (36.8 °C) (Oral)   Resp 16   Ht 6' (1.829 m)   Wt 274 lb (124.3 kg)   SpO2 94%   BMI 37.16 kg/m²   Temp (24hrs), Av.6 °F (37 °C), Min:98.2 °F (36.8 °C), Max:98.9 °F (37.2 °C)    No results for input(s): POCGLU in the last 72 hours. I/O (24Hr): Intake/Output Summary (Last 24 hours) at 2022 1338  Last data filed at 2022 1208  Gross per 24 hour   Intake 500 ml   Output 15787 ml   Net -33152 ml       Labs:  Hematology:  Recent Labs     22  0356 22  0356 22  0556 22  0556 22  1642 22  0033 22  0847   WBC 11.6*  --  8.5  --   --   --   --    RBC 4.50  --  3.81*  --   --   --   --    HGB 12.9*   < > 10.9*   < > 10.7* 10.9* 10.4*   HCT 40.9   < > 34.9*   < > 34.1* 34.3* 32.8*   MCV 90.9  --  91.6  --   --   --   --    MCH 28.7  --  28.6  --   --   --   --    MCHC 31.5  --  31.2  --   --   --   --    RDW 12.9  --  13.3  --   --   --   --      --  192  --   --   --   --    MPV 9.9  --  9.1  --   --   --   --    INR 1.1  --   --   --   --   --   --     < > = values in this interval not displayed.      Chemistry:  Recent Labs     22  0534 22  0556    136   K 4.0 4.0    101   CO2 19* 26   GLUCOSE 178* 129*   BUN 27* 21   CREATININE 2.02* 1.10   ANIONGAP 16 9   LABGLOM 33* >60   GFRAA 40* >60   CALCIUM 8.8 8.4*     Recent Labs     22  0534 22  0556   PROT 7.1  --    LABALBU 4.0  --    LABA1C  --  5.6   AST 14  --    ALT 16  --    ALKPHOS 78  --    BILITOT 0.40  --      ABG:No results found for: POCPH, PHART, PH, POCPCO2, KHU4KNY, PCO2, POCPO2, PO2ART, PO2, POCHCO3, WBS0WAN, HCO3, NBEA, PBEA, BEART, BE, THGBART, THB, IAT5BET, TYBG5CYO, W6GICTSR, O2SAT, FIO2  No results found for: SPECIAL  Lab Results   Component Value Date/Time    CULTURE NO GROWTH 05/05/2022 04:30 AM       Radiology:  CT ABDOMEN PELVIS WO CONTRAST Additional Contrast? None    Result Date: 5/5/2022  Findings consistent with severe emphysematous cystitis. The bladder appears filled with clot and hemorrhage. Bilateral mildly atrophic kidneys with multiple bilateral parapelvic cysts, stable. Physical Examination:        General appearance:  alert, cooperative and no distress  Mental Status:  oriented to person, place and time and normal affect  Lungs:  clear to auscultation bilaterally, normal effort  Heart:  regular rate and rhythm, no murmur  Abdomen:  soft, nontender, nondistended, normal bowel sounds, no masses, hepatomegaly, splenomegaly; obese  Extremities:  no edema, redness, tenderness in the calves  Skin:  no gross lesions, rashes, induration    Assessment:        Hospital Problems           Last Modified POA    * (Principal) Acute cystitis with hematuria 5/5/2022 Yes    BPH with urinary obstruction 5/5/2022 Yes          Plan:        1. CBI stopped by urology  2. Renal function better yesterday: will check today's bun/cr  3.  Possible dc home later today if hematuria improved    Lisa Caban, DO  5/7/2022  1:38 PM

## 2022-05-07 NOTE — PLAN OF CARE
Problem: Discharge Planning  Goal: Discharge to home or other facility with appropriate resources  5/7/2022 0335 by Mendel Freud, RN  Outcome: Progressing     Problem: Pain  Goal: Verbalizes/displays adequate comfort level or baseline comfort level  5/7/2022 0335 by Mendel Freud, RN  Outcome: Progressing  Flowsheets (Taken 5/7/2022 0335)  Verbalizes/displays adequate comfort level or baseline comfort level:   Encourage patient to monitor pain and request assistance   Assess pain using appropriate pain scale     Problem: Genitourinary - Adult  Goal: Absence of urinary retention  5/7/2022 0335 by Mendel Freud, RN  Outcome: Progressing  Flowsheets (Taken 5/7/2022 0335)  Absence of urinary retention:   Assess patients ability to void and empty bladder   Monitor intake/output and perform bladder scan as needed   Place urinary catheter per Licensed Independent Practitioner order if needed     Problem: Genitourinary - Adult  Goal: Urinary catheter remains patent  5/7/2022 0335 by Mendel Freud, RN  Outcome: Progressing  Flowsheets (Taken 5/7/2022 0335)  Urinary catheter remains patent:   Assess patency of urinary catheter   Irrigate catheter per Licensed Independent Practitioner order if indicated and notify Licensed Independent Practitioner if unable to irrigate   Assess need for a larger catheter size or a 3-way catheter for continuous bladder irrigation     Problem: Infection - Adult  Goal: Absence of infection at discharge  5/7/2022 0335 by Mendel Freud, RN  Outcome: Progressing  Flowsheets (Taken 5/7/2022 0335)  Absence of infection at discharge:   Assess and monitor for signs and symptoms of infection   Monitor lab/diagnostic results   Monitor all insertion sites i.e., indwelling lines, tubes and drains   Administer medications as ordered     Problem: Infection - Adult  Goal: Absence of infection during hospitalization  5/7/2022 0335 by Belinda Araya SEVERIANO Munoz  Outcome: Progressing  Flowsheets (Taken 5/7/2022 0335)  Absence of infection during hospitalization:   Assess and monitor for signs and symptoms of infection   Monitor lab/diagnostic results   Administer medications as ordered     Problem: Infection - Adult  Goal: Absence of fever/infection during anticipated neutropenic period  5/7/2022 0335 by Judson Modi RN  Outcome: Progressing  Flowsheets (Taken 5/7/2022 0335)  Absence of fever/infection during anticipated neutropenic period: Monitor white blood cell count     Problem: Metabolic/Fluid and Electrolytes - Adult  Goal: Electrolytes maintained within normal limits  5/7/2022 0335 by Judson Modi RN  Outcome: Progressing  Flowsheets (Taken 5/7/2022 0335)  Electrolytes maintained within normal limits: Monitor labs and assess patient for signs and symptoms of electrolyte imbalances     Problem: Metabolic/Fluid and Electrolytes - Adult  Goal: Hemodynamic stability and optimal renal function maintained  5/7/2022 0335 by Judson Modi RN  Outcome: Progressing     Problem: Metabolic/Fluid and Electrolytes - Adult  Goal: Glucose maintained within prescribed range  5/7/2022 0335 by Judson Modi RN  Outcome: Progressing     Problem: Hematologic - Adult  Goal: Maintains hematologic stability  5/7/2022 0335 by Judson Modi RN  Outcome: Progressing  Flowsheets (Taken 5/7/2022 0335)  Maintains hematologic stability:   Assess for signs and symptoms of bleeding or hemorrhage   Monitor labs for bleeding or clotting disorders     Problem: Safety - Adult  Goal: Free from fall injury  5/7/2022 0335 by Judson Modi RN  Outcome: Progressing  Flowsheets (Taken 5/7/2022 0335)  Free From Fall Injury:   Instruct family/caregiver on patient safety   Based on caregiver fall risk screen, instruct family/caregiver to ask for assistance with transferring infant if caregiver noted to have fall risk factors

## 2022-05-07 NOTE — PROGRESS NOTES
Physician Progress Note      PATIENTMarlaine Epley  CSN #:                  [id number]  :                       1955  ADMIT DATE:       2022 3:37 AM  DISCH DATE:  RESPONDING  PROVIDER #:        Dakotah VILLEDA BLOOD DO          QUERY TEXT:    Pt admitted with acute cystitis with hematuria. Pt noted to have low HGB @   10.9. If possible, please document in the progress notes and discharge summary   if you are evaluating and/or treating any of the following: The medical record reflects the following:  Risk Factors:  acute cystitis with hematuria, robotic assisted suprapubic   prostatectomy and cystolithotomy on 22. Clinical Indicators: HGB  12.9->5/6 10.9. last admission  HGB 13.0 CT:   severe emphysematous cystitis. The bladder appears filled with clot and   hemorrhage. UA cloudy, + Nitrate, WBC 5-10, RBC too numerous . per urology   consult: All clots irrigated from bladder and patient re-started on moderate   continuous bladder irrigation. Treatment: IV Rocephin, Proscar, B&O, pyridium, Flomax ,  26F 3way indwelling   Maldonado catheter  Options provided:  -- Acute blood loss anemia  -- Acute on chronic blood loss anemia  -- Other - I will add my own diagnosis  -- Disagree - Not applicable / Not valid  -- Disagree - Clinically unable to determine / Unknown  -- Refer to Clinical Documentation Reviewer    PROVIDER RESPONSE TEXT:    This patient has acute blood loss anemia.     Query created by: Shane Sargent on 2022 7:48 AM      Electronically signed by:  Yandy Macais BLOOD DO 2022 1:45 PM

## 2022-05-07 NOTE — PROGRESS NOTES
Urology Progress Note    Subjective: Urine blood tinged on minimal CBI. No hand irrigation overnight. Hb 10.9 at midnight. Urine culture negative. Patient Vitals for the past 24 hrs:   BP Temp Temp src Pulse Resp SpO2   05/07/22 0638 (!) 110/58 98.2 °F (36.8 °C) Oral 79 16 94 %   05/07/22 0503 -- -- -- -- 16 --   05/06/22 1812 125/64 98.9 °F (37.2 °C) -- 108 16 95 %   05/06/22 1101 -- -- -- -- 16 --       Intake/Output Summary (Last 24 hours) at 5/7/2022 0708  Last data filed at 5/6/2022 1816  Gross per 24 hour   Intake --   Output 1500 ml   Net -1500 ml       Recent Labs     05/05/22  0356 05/05/22  0356 05/06/22  0556 05/06/22  1642 05/07/22  0033   WBC 11.6*  --  8.5  --   --    HGB 12.9*   < > 10.9* 10.7* 10.9*   HCT 40.9   < > 34.9* 34.1* 34.3*   MCV 90.9  --  91.6  --   --      --  192  --   --     < > = values in this interval not displayed. Recent Labs     05/05/22  0534 05/06/22  0556    136   K 4.0 4.0    101   CO2 19* 26   BUN 27* 21   CREATININE 2.02* 1.10       Recent Labs     05/05/22  0430   COLORU Red*   PHUR 7.5   WBCUA 5 TO 10   RBCUA TOO NUMEROUS TO COUNT   SPECGRAV 1.025   LEUKOCYTESUR NEGATIVE   UROBILINOGEN Normal   BILIRUBINUR NEGATIVE       Additional Lab/culture results:    Physical Exam: soft, nontender, nondistended, no masses or organomegaly normal circumcised penis    Interval Imaging Findings:    Impression: Gross hematuria    Patient Active Problem List   Diagnosis    Morbid (severe) obesity due to excess calories ([address]    Umbilical hernia without obstruction or gangrene    Epigastric pain    Elevated PSA    Gross hematuria    BPH with obstruction/lower urinary tract symptoms    Bladder calculi    BPH with urinary obstruction    Acute cystitis with hematuria    JOSE EDUARDO (acute kidney injury) ([address]    High cholesterol       Plan: Stop CBI. Stop Rocephin. Ambulate. Home with boykin if hematuria resolved.     Abrahan Shepard MD  7:08 AM 5/7/2022

## 2022-05-07 NOTE — PLAN OF CARE
Problem: Discharge Planning  Goal: Discharge to home or other facility with appropriate resources  5/7/2022 0335 by Julien Etienne RN  Outcome: Progressing     Problem: Pain  Goal: Verbalizes/displays adequate comfort level or baseline comfort level  5/7/2022 0335 by Julien Etienne RN  Outcome: Progressing  Flowsheets (Taken 5/7/2022 0335)  Verbalizes/displays adequate comfort level or baseline comfort level:   Encourage patient to monitor pain and request assistance   Assess pain using appropriate pain scale     Problem: Genitourinary - Adult  Goal: Absence of urinary retention  5/7/2022 0335 by Julien Etienne RN  Outcome: Progressing  Flowsheets (Taken 5/7/2022 0335)  Absence of urinary retention:   Assess patients ability to void and empty bladder   Monitor intake/output and perform bladder scan as needed   Place urinary catheter per Licensed Independent Practitioner order if needed  Goal: Urinary catheter remains patent  5/7/2022 1524 by Sarika Amezquita RN  Outcome: Progressing  5/7/2022 0335 by Julien Etienne RN  Outcome: Progressing  Flowsheets (Taken 5/7/2022 0335)  Urinary catheter remains patent:   Assess patency of urinary catheter   Irrigate catheter per Licensed Independent Practitioner order if indicated and notify Licensed Independent Practitioner if unable to irrigate   Assess need for a larger catheter size or a 3-way catheter for continuous bladder irrigation     Problem: Infection - Adult  Goal: Absence of infection at discharge  5/7/2022 0335 by Julien Etienne RN  Outcome: Progressing  Flowsheets (Taken 5/7/2022 0335)  Absence of infection at discharge:   Assess and monitor for signs and symptoms of infection   Monitor lab/diagnostic results   Monitor all insertion sites i.e., indwelling lines, tubes and drains   Administer medications as ordered  Goal: Absence of infection during hospitalization  5/7/2022 0335 by Juan Stubbs SEVERIANO Munoz  Outcome: Progressing  Flowsheets (Taken 5/7/2022 0335)  Absence of infection during hospitalization:   Assess and monitor for signs and symptoms of infection   Monitor lab/diagnostic results   Administer medications as ordered  Goal: Absence of fever/infection during anticipated neutropenic period  5/7/2022 0335 by Chantal Miller RN  Outcome: Progressing  Flowsheets (Taken 5/7/2022 0335)  Absence of fever/infection during anticipated neutropenic period: Monitor white blood cell count     Problem: Metabolic/Fluid and Electrolytes - Adult  Goal: Electrolytes maintained within normal limits  5/7/2022 1524 by Crispin Arevalo RN  Outcome: Progressing  5/7/2022 0335 by Chantal Miller RN  Outcome: Progressing  Flowsheets (Taken 5/7/2022 0335)  Electrolytes maintained within normal limits: Monitor labs and assess patient for signs and symptoms of electrolyte imbalances  Goal: Hemodynamic stability and optimal renal function maintained  5/7/2022 1524 by Crispin Arevalo RN  Outcome: Progressing  5/7/2022 0335 by Chantal Miller RN  Outcome: Progressing  Goal: Glucose maintained within prescribed range  5/7/2022 0335 by Chantal Miller RN  Outcome: Progressing     Problem: Hematologic - Adult  Goal: Maintains hematologic stability  5/7/2022 0335 by Chantal Miller RN  Outcome: Progressing  Flowsheets (Taken 5/7/2022 0335)  Maintains hematologic stability:   Assess for signs and symptoms of bleeding or hemorrhage   Monitor labs for bleeding or clotting disorders     Problem: Safety - Adult  Goal: Free from fall injury  5/7/2022 1524 by Crispin Arevalo RN  Outcome: Progressing  Flowsheets (Taken 5/7/2022 0338 by Chantal Miller RN)  Free From Fall Injury:   Instruct family/caregiver on patient safety   Based on caregiver fall risk screen, instruct family/caregiver to ask for assistance with transferring infant if caregiver noted to have fall risk factors  5/7/2022 0335 by Bhavna Mathew RN  Outcome: Progressing  Flowsheets (Taken 5/7/2022 0335)  Free From Fall Injury:   Instruct family/caregiver on patient safety   Based on caregiver fall risk screen, instruct family/caregiver to ask for assistance with transferring infant if caregiver noted to have fall risk factors     Problem: Genitourinary - Adult  Goal: Absence of urinary retention  5/7/2022 0335 by Bhavna Mathew RN  Outcome: Progressing  Flowsheets (Taken 5/7/2022 0335)  Absence of urinary retention:   Assess patients ability to void and empty bladder   Monitor intake/output and perform bladder scan as needed   Place urinary catheter per Licensed Independent Practitioner order if needed

## 2022-05-08 VITALS
BODY MASS INDEX: 37.11 KG/M2 | TEMPERATURE: 98.1 F | SYSTOLIC BLOOD PRESSURE: 119 MMHG | WEIGHT: 274 LBS | HEIGHT: 72 IN | HEART RATE: 97 BPM | RESPIRATION RATE: 16 BRPM | DIASTOLIC BLOOD PRESSURE: 64 MMHG | OXYGEN SATURATION: 94 %

## 2022-05-08 LAB
HCT VFR BLD CALC: 34.1 % (ref 40.7–50.3)
HCT VFR BLD CALC: 34.2 % (ref 40.7–50.3)
HEMOGLOBIN: 10.9 G/DL (ref 13–17)
HEMOGLOBIN: 11 G/DL (ref 13–17)

## 2022-05-08 PROCEDURE — 6360000002 HC RX W HCPCS: Performed by: NURSE PRACTITIONER

## 2022-05-08 PROCEDURE — 99238 HOSP IP/OBS DSCHRG MGMT 30/<: CPT | Performed by: INTERNAL MEDICINE

## 2022-05-08 PROCEDURE — 85018 HEMOGLOBIN: CPT

## 2022-05-08 PROCEDURE — 51700 IRRIGATION OF BLADDER: CPT

## 2022-05-08 PROCEDURE — 6370000000 HC RX 637 (ALT 250 FOR IP): Performed by: SPECIALIST

## 2022-05-08 PROCEDURE — 36415 COLL VENOUS BLD VENIPUNCTURE: CPT

## 2022-05-08 PROCEDURE — 85014 HEMATOCRIT: CPT

## 2022-05-08 PROCEDURE — 2580000003 HC RX 258: Performed by: NURSE PRACTITIONER

## 2022-05-08 RX ORDER — HYDROCODONE BITARTRATE AND ACETAMINOPHEN 5; 325 MG/1; MG/1
1 TABLET ORAL EVERY 4 HOURS PRN
Qty: 18 TABLET | Refills: 0 | Status: SHIPPED | OUTPATIENT
Start: 2022-05-08 | End: 2022-05-11

## 2022-05-08 RX ORDER — TROSPIUM CHLORIDE 20 MG/1
20 TABLET, FILM COATED ORAL
Qty: 60 TABLET | Refills: 0 | Status: ON HOLD | OUTPATIENT
Start: 2022-05-08 | End: 2022-05-11 | Stop reason: HOSPADM

## 2022-05-08 RX ADMIN — HYDROMORPHONE HYDROCHLORIDE 1 MG: 1 INJECTION, SOLUTION INTRAMUSCULAR; INTRAVENOUS; SUBCUTANEOUS at 13:31

## 2022-05-08 RX ADMIN — TROSPIUM CHLORIDE 20 MG: 20 TABLET, FILM COATED ORAL at 06:31

## 2022-05-08 RX ADMIN — HYDROMORPHONE HYDROCHLORIDE 1 MG: 1 INJECTION, SOLUTION INTRAMUSCULAR; INTRAVENOUS; SUBCUTANEOUS at 04:39

## 2022-05-08 RX ADMIN — TROSPIUM CHLORIDE 20 MG: 20 TABLET, FILM COATED ORAL at 17:07

## 2022-05-08 RX ADMIN — SODIUM CHLORIDE, PRESERVATIVE FREE 10 ML: 5 INJECTION INTRAVENOUS at 12:30

## 2022-05-08 ASSESSMENT — PAIN SCALES - GENERAL: PAINLEVEL_OUTOF10: 7

## 2022-05-08 ASSESSMENT — PAIN DESCRIPTION - LOCATION
LOCATION: ABDOMEN;PENIS
LOCATION: ABDOMEN

## 2022-05-08 ASSESSMENT — PAIN DESCRIPTION - DESCRIPTORS: DESCRIPTORS: OTHER (COMMENT)

## 2022-05-08 NOTE — PROGRESS NOTES
[facility]  Office: [address], DO, Emmie Rodriguez, DO, Warren Gonzalez, DO, [employer], DO, Richard Bonilla MD, Erwin Schaefer MD, Beatriz Marcos MD, Jolynn Griffiths MD, Pita Canales MD, Joseph Alvarez MD, Juliana Cee MD, Umm Clemens, DO, Lan Burgess, DO, Catrachita Mccullough MD,  Kolby Goodwin, DO, Barry Baltazar MD, Melvern Essex, MD, Beverly Ghotra MD, Pepe Campos, DO, Jessica Lemus MD, Maximino Amaral MD, Gamal Pascual MD, Jessika Lentz [facility], [facility], [facility], Jori Kraus, [facility], Ariane Bradford, CNP, Ernestina Solares, CNP, Mena May CNP, Laney Calvert PA-C, Justo Dennis [facility], Elida Lizarraga CNP, Esme Westbrook, CNP, Pretty Petty, CNP, Dolores Anton, CNS, Gregor Brock [facility], Konstantin Hyde, CNP, Namita Reyes, CNP, Michael Crowell, [facility]    Progress Note    5/8/2022    10:45 AM    Name:   Tabitha Oppenheim  MRN:     [id number]     Acct:      [de-identified]   Room:   2009/2009-02  IP Day:  3  Admit Date:  5/5/2022  3:37 AM    PCP:   SHELDON Gracia CNP  Code Status:  Full Code    Subjective:     C/C:   Chief Complaint   Patient presents with   [facility] Other     Pt states he had surgery to remove bladder stones one month ago and has been passing clots but today is having difficulty voiding    Abdominal Pain     Interval History Status: not changed. Feels ok  CBI restarted yesterday    No longer having urgency like before    Urine still red tinged    Brief History:     Per my REYNA:  \"Milan Jimenes is a 78 y.o.   male with a history of BPH with urinary obstruction who presents with suprapubic pain and pressure, difficulty urinating and hematuria  and is admitted to the hospital for the management of Acute cystitis with hematuria. Patient recently had robotic assisted suprapubic prostatectomy and cystolithotomy by Dr. Yokasta Gaming on 4/6/22.  He states he was sent home with bactrim and was doing well at home until fairly recently. He followed up for his post-op visit  On 4/28 and was having similar symptoms at that time. He states he was started on antibiotics but UA was negative for infection and they were stopped. He states his symptoms have gotten progressively worse since then. His pain became quite severe last night. He denies any fever, back pain, chills or changes in his bowel habits. He is not on any blood thinners. Maldonado catheter placement with bladder irrigation attempted in ED unsuccessful due to blood clots despite manual irrigation.      CT abd/pelvis consistent with severe emphysematous cystitis. Bladder appears filled with clot and hemorrhage. Bilateral mildly atrophic kidneys with multiple parapelvic cysts, stable\"    Review of Systems:     Constitutional:  negative for chills, fevers, sweats  Respiratory:  negative for cough, dyspnea on exertion, shortness of breath, wheezing  Cardiovascular:  negative for chest pain, chest pressure/discomfort, lower extremity edema, palpitations  Gastrointestinal:  negative for abdominal pain, constipation, diarrhea, nausea, vomiting  Neurological:  negative for dizziness, headache    Medications: Allergies:  No Known Allergies    Current Meds:   Scheduled Meds:    trospium  20 mg Oral BID AC    tamsulosin  0.4 mg Oral Daily    finasteride  5 mg Oral Daily    sodium chloride flush  5-40 mL IntraVENous 2 times per day     Continuous Infusions:    sodium chloride 75 mL/hr at 05/07/22 0646     PRN Meds: opium-belladonna, phenazopyridine, sodium chloride flush, sodium chloride, ondansetron **OR** ondansetron, acetaminophen **OR** acetaminophen, polyethylene glycol, HYDROmorphone    Data:     Past Medical History:   has a past medical history of JOSE EDUARDO (acute kidney injury) ([facility] [address] Arthritis, Back pain, Hemorrhoids, High cholesterol, History of tinnitus, Prediabetes, and Stomach ulcer.     Social History:   reports that he quit smoking about 32 years ago. He has a 10.00 pack-year smoking history. He has never used smokeless tobacco. He reports that he does not drink alcohol and does not use drugs. Family History:   Family History   Problem Relation Age of Onset    No Known Problems Mother     Cancer Father     Alcohol Abuse Father     No Known Problems Sister     No Known Problems Sister     No Known Problems Sister        Vitals:  /64   Pulse 97   Temp 98.1 °F (36.7 °C)   Resp 16   Ht 6' (1.829 m)   Wt 274 lb (124.3 kg)   SpO2 94%   BMI 37.16 kg/m²   Temp (24hrs), Av.6 °F (37 °C), Min:98.1 °F (36.7 °C), Max:99 °F (37.2 °C)    No results for input(s): POCGLU in the last 72 hours. I/O (24Hr): Intake/Output Summary (Last 24 hours) at 2022 1045  Last data filed at 2022 1025  Gross per 24 hour   Intake --   Output 5050 ml   Net -5050 ml       Labs:  Hematology:  Recent Labs     22  0556 22  1642 22  1625 22  0032 22  0850   WBC 8.5  --   --   --   --    RBC 3.81*  --   --   --   --    HGB 10.9*   < > 11.0* 10.9* 11.0*   HCT 34.9*   < > 34.5* 34.2* 34.1*   MCV 91.6  --   --   --   --    MCH 28.6  --   --   --   --    MCHC 31.2  --   --   --   --    RDW 13.3  --   --   --   --      --   --   --   --    MPV 9.1  --   --   --   --     < > = values in this interval not displayed.      Chemistry:  Recent Labs     22  0556 22  1625    137   K 4.0 4.1    103   CO2 26 25   GLUCOSE 129* 97   BUN 21 14   CREATININE 1.10 1.00   ANIONGAP 9 9   LABGLOM >60 >60   GFRAA >60 >60   CALCIUM 8.4* 8.7     Recent Labs     22  0556   LABA1C 5.6     ABG:No results found for: POCPH, PHART, PH, POCPCO2, JLX6BBG, PCO2, POCPO2, PO2ART, PO2, POCHCO3, IFR7BON, HCO3, NBEA, PBEA, BEART, BE, THGBART, THB, WJP2GQI, XPHT1YOK, P5MAGICU, O2SAT, FIO2  No results found for: SPECIAL  Lab Results   Component Value Date/Time    CULTURE NO GROWTH 2022 04:30 AM       Radiology:  CT ABDOMEN PELVIS WO CONTRAST Additional Contrast? None    Result Date: 5/5/2022  Findings consistent with severe emphysematous cystitis. The bladder appears filled with clot and hemorrhage. Bilateral mildly atrophic kidneys with multiple bilateral parapelvic cysts, stable. Physical Examination:        General appearance:  alert, cooperative and no distress  Mental Status:  oriented to person, place and time and normal affect  Lungs:  clear to auscultation bilaterally, normal effort  Heart:  regular rate and rhythm, no murmur  Abdomen:  soft, nontender, nondistended, normal bowel sounds, no masses, hepatomegaly, splenomegaly; obese  Extremities:  no edema, redness, tenderness in the calves  Skin:  no gross lesions, rashes, induration    Assessment:        Hospital Problems           Last Modified POA    * (Principal) Acute cystitis with hematuria 5/5/2022 Yes    BPH with urinary obstruction 5/5/2022 Yes          Plan:        1. CBI resumed yesterday by urology  2. Renal function normalized  3.  Possible dc home later today if hematuria improved-up to urology    Early Chatsejal Caban DO  5/8/2022  10:45 AM

## 2022-05-08 NOTE — PROGRESS NOTES
Pt discharged to home in stable condition with belongings. Discharge instructions given to pt and pt's wife. Pt denies having any further questions at this time. Patient/family state they have everything they were admitted with.

## 2022-05-08 NOTE — PLAN OF CARE
Problem: Pain  Goal: Verbalizes/displays adequate comfort level or baseline comfort level  Outcome: Not Progressing     Problem: Infection - Adult  Goal: Absence of fever/infection during anticipated neutropenic period  Outcome: Progressing     Problem: Metabolic/Fluid and Electrolytes - Adult  Goal: Electrolytes maintained within normal limits  Outcome: Progressing  Goal: Hemodynamic stability and optimal renal function maintained  Outcome: Progressing

## 2022-05-08 NOTE — DISCHARGE SUMMARY
[facility]  Office: [address], DO, Eliza Narayan, DO, Angelina Mock, DO, Michael Caban, DO, Josefina Fish MD, Kinjal Alarcon MD, Queen Will MD, Chapincito Blum MD, Beata Small MD, Lilibeth Morales MD, Gene Wagner MD, Dena Landers DO, Mya Dick DO, Loki Salazar MD,  Julius Vickers DO, Radha Clarke MD, Lee Ann Can MD, Brandon Reyes MD, Madai Cardenas DO, Andres Coffman MD, Lety Gamez MD, Camden Phillips MD, Martinez Bobby [facility], [facility], CNP, Fay Caldera, CNP, Edmond Buckley, CNP, Vidal Pham, CNP, Izabela Bird, CNP, Bran Navarro PA-C, Miguel Nelson [facility], Jyoti Malhotra, [facility], Jose Resendiz, CNP, Aggie Unger, CNP, Rafael Hamilton, CNS, Pura Morris, [facility], Martha Villarreal, CNP, Reji Mcneal, CNP, Shane Puckett, [facility]    Discharge Summary     Patient ID: Kendall Canchola  :     MRN: [id number]     ACCOUNT:  [de-identified]   Patient's PCP: SHELDON Olmos CNP  Admit Date: 2022   Discharge Date: 2022     Length of Stay: 3  Code Status:  Full Code  Admitting Physician: Chad Caban DO  Discharge Physician: Chad Caban DO     Active Discharge Diagnoses:     Hospital Problem Lists:  Principal Problem:    Acute cystitis with hematuria  Active Problems:    BPH with urinary obstruction  Resolved Problems:    JOSE EDUARDO (acute kidney injury) [facility])      Admission Condition:  fair     Discharged Condition: stable    Hospital Stay:     Hospital Course:  Kendall Canchola is a 78 y.o. male who was admitted for the management of  Acute cystitis with hematuria , presented to ER with Other (Pt states he had surgery to remove bladder stones one month ago and has been passing clots but today is having difficulty voiding) and Abdominal Pain    Admitted with hematuria with clots. Seen by urology and placed on sanctura and bladder irrigations.   Also initially on rocephin for 3 days, but stopped when cultures negative. On 5/7, irrigation stopped and he started having hematuria again so it was resumed. On 5/8, urology stopped CBI, had boykin removed and authorized discharge. Later in day, urinated on own, had some hematuria then as well, urology verified again he is stable for discharge      Significant therapeutic interventions: see above    Significant Diagnostic Studies:   Labs / Micro:  CBC:   Lab Results   Component Value Date    WBC 8.5 05/06/2022    RBC 3.81 05/06/2022    HGB 11.0 05/08/2022    HCT 34.1 05/08/2022    MCV 91.6 05/06/2022    MCH 28.6 05/06/2022    MCHC 31.2 05/06/2022    RDW 13.3 05/06/2022     05/06/2022     CMP:    Lab Results   Component Value Date    GLUCOSE 97 05/07/2022     05/07/2022    K 4.1 05/07/2022     05/07/2022    CO2 25 05/07/2022    BUN 14 05/07/2022    CREATININE 1.00 05/07/2022    ANIONGAP 9 05/07/2022    ALKPHOS 78 05/05/2022    ALT 16 05/05/2022    AST 14 05/05/2022    BILITOT 0.40 05/05/2022    LABALBU 4.0 05/05/2022    ALBUMIN 1.1 01/21/2022    LABGLOM >60 05/07/2022    GFRAA >60 05/07/2022    GFR      05/07/2022    PROT 7.1 05/05/2022    CALCIUM 8.7 05/07/2022        Radiology:  CT ABDOMEN PELVIS WO CONTRAST Additional Contrast? None    Result Date: 5/5/2022  Findings consistent with severe emphysematous cystitis. The bladder appears filled with clot and hemorrhage. Bilateral mildly atrophic kidneys with multiple bilateral parapelvic cysts, stable. Consultations:    Consults:     Final Specialist Recommendations/Findings:   IP CONSULT TO UROLOGY  IP CONSULT TO HOSPITALIST  IP CONSULT TO UROLOGY      The patient was seen and examined on day of discharge and this discharge summary is in conjunction with any daily progress note from day of discharge.     Discharge plan:     Disposition: Home    Physician Follow Up:     Albino Hanks, APRN - CNP  [address] [facility] [address]  [phone]    Schedule an appointment as soon as possible for a visit      Vidhya Miller MD  [facility] [address]  [facility] [address]  [phone]    Schedule an appointment as soon as possible for a visit         Requiring Further Evaluation/Follow Up POST HOSPITALIZATION/Incidental Findings: hematuria    Diet: regular diet    Activity: As tolerated    Instructions to Patient: take medications as prescribed      Discharge Medications:      Medication List      START taking these medications    HYDROcodone-acetaminophen 5-325 MG per tablet  Commonly known as: Norco  Take 1 tablet by mouth every 4 hours as needed for Pain for up to 3 days. Intended supply: 3 days. Take lowest dose possible to manage pain     trospium 20 MG tablet  Commonly known as: SANCTURA  Take 1 tablet by mouth 2 times daily (before meals)        CONTINUE taking these medications    alfuzosin 10 MG extended release tablet  Commonly known as: UROXATRAL  Take 1 tablet by mouth daily     finasteride 5 MG tablet  Commonly known as: PROSCAR  Take 1 tablet by mouth daily     phenazopyridine 200 MG tablet  Commonly known as: PYRIDIUM  Take 1 tablet by mouth 3 times daily as needed for Pain        STOP taking these medications    levoFLOXacin 500 MG tablet  Commonly known as: Levaquin     sulfamethoxazole-trimethoprim 800-160 MG per tablet  Commonly known as: Bactrim DS           Where to Get Your Medications      These medications were sent to [address] [facility] [address] [facility] [place] [address]    Phone: [phone]   · HYDROcodone-acetaminophen 5-325 MG per tablet  · trospium 20 MG tablet         No discharge procedures on file. Time Spent on discharge is  20 mins in patient examination, evaluation, counseling as well as medication reconciliation, prescriptions for required medications, discharge plan and follow up.     Electronically signed by   [place] Wiley Blood, DO  5/8/2022  4:14 PM      Thank you SHELDON Sullivan - CARLOS for the opportunity to be involved in this patient's care.

## 2022-05-08 NOTE — PROGRESS NOTES
Urology Progress Note    Subjective: urine pink in boykin, no clots. Hb stable. Patient Vitals for the past 24 hrs:   BP Temp Temp src Pulse Resp SpO2   05/08/22 0731 119/64 98.1 °F (36.7 °C) -- 97 16 94 %   05/08/22 0509 -- -- -- -- 18 --   05/08/22 0003 -- -- -- -- 18 --   05/07/22 2146 -- -- -- -- 18 --   05/07/22 2109 135/69 99 °F (37.2 °C) Oral 86 18 92 %       Intake/Output Summary (Last 24 hours) at 5/8/2022 1017  Last data filed at 5/8/2022 2165  Gross per 24 hour   Intake --   Output 4650 ml   Net -4650 ml       Recent Labs     05/06/22  0556 05/06/22  1642 05/07/22  1625 05/08/22  0032 05/08/22  0850   WBC 8.5  --   --   --   --    HGB 10.9*   < > 11.0* 10.9* 11.0*   HCT 34.9*   < > 34.5* 34.2* 34.1*   MCV 91.6  --   --   --   --      --   --   --   --     < > = values in this interval not displayed. Recent Labs     05/06/22  0556 05/07/22  1625    137   K 4.0 4.1    103   CO2 26 25   BUN 21 14   CREATININE 1.10 1.00       No results for input(s): COLORU, PHUR, LABCAST, WBCUA, RBCUA, MUCUS, TRICHOMONAS, YEAST, BACTERIA, CLARITYU, SPECGRAV, LEUKOCYTESUR, UROBILINOGEN, BILIRUBINUR, BLOODU in the last 72 hours. Invalid input(s): NITRATE, GLUCOSEUKETONESUAMORPHOUS    Additional Lab/culture results:    Physical Exam: soft, nontender, nondistended, no masses or organomegaly normal circumcised penis    Interval Imaging Findings:    Impression: Gross hematuria    Patient Active Problem List   Diagnosis    Morbid (severe) obesity due to excess calories ([address]    Umbilical hernia without obstruction or gangrene    Epigastric pain    Elevated PSA    Gross hematuria    BPH with obstruction/lower urinary tract symptoms    Bladder calculi    BPH with urinary obstruction    Acute cystitis with hematuria    High cholesterol       Plan: Remove boykin. Discharge and follow up Dr. Dorota Barboza.     Meghan Mejía MD  10:17 AM 5/8/2022

## 2022-05-09 PROBLEM — R33.8 ACUTE URINARY RETENTION: Status: ACTIVE | Noted: 2022-05-09

## 2022-05-10 ENCOUNTER — ANESTHESIA EVENT (OUTPATIENT)
Dept: OPERATING ROOM | Age: 67
End: 2022-05-10
Payer: COMMERCIAL

## 2022-05-10 ENCOUNTER — HOSPITAL ENCOUNTER (EMERGENCY)
Age: 67
Discharge: HOME | End: 2022-05-10
Attending: EMERGENCY MEDICINE
Payer: COMMERCIAL

## 2022-05-10 VITALS
SYSTOLIC BLOOD PRESSURE: 140 MMHG | DIASTOLIC BLOOD PRESSURE: 81 MMHG | TEMPERATURE: 98.1 F | HEART RATE: 102 BPM | OXYGEN SATURATION: 97 % | RESPIRATION RATE: 15 BRPM

## 2022-05-10 DIAGNOSIS — T83.9XXA PROBLEM WITH FOLEY CATHETER, INITIAL ENCOUNTER (HCC): Primary | ICD-10-CM

## 2022-05-10 LAB
ABSOLUTE EOS #: 0.24 K/UL (ref 0–0.44)
ABSOLUTE IMMATURE GRANULOCYTE: 0.02 K/UL (ref 0–0.3)
ABSOLUTE LYMPH #: 1.05 K/UL (ref 1.1–3.7)
ABSOLUTE MONO #: 0.62 K/UL (ref 0.1–1.2)
ANION GAP SERPL CALCULATED.3IONS-SCNC: 8 MMOL/L (ref 9–17)
BASOPHILS # BLD: 0 % (ref 0–2)
BASOPHILS ABSOLUTE: <0.03 K/UL (ref 0–0.2)
BUN BLDV-MCNC: 13 MG/DL (ref 8–23)
BUN/CREAT BLD: 11 (ref 9–20)
CALCIUM SERPL-MCNC: 8.5 MG/DL (ref 8.6–10.4)
CHLORIDE BLD-SCNC: 105 MMOL/L (ref 98–107)
CO2: 26 MMOL/L (ref 20–31)
CREAT SERPL-MCNC: 1.21 MG/DL (ref 0.7–1.2)
EOSINOPHILS RELATIVE PERCENT: 4 % (ref 1–4)
GFR AFRICAN AMERICAN: >60 ML/MIN
GFR NON-AFRICAN AMERICAN: 60 ML/MIN
GFR SERPL CREATININE-BSD FRML MDRD: ABNORMAL ML/MIN/{1.73_M2}
GLUCOSE BLD-MCNC: 124 MG/DL (ref 70–99)
HCT VFR BLD CALC: 32.9 % (ref 40.7–50.3)
HEMOGLOBIN: 10.5 G/DL (ref 13–17)
IMMATURE GRANULOCYTES: 0 %
LYMPHOCYTES # BLD: 18 % (ref 24–43)
MCH RBC QN AUTO: 28.5 PG (ref 25.2–33.5)
MCHC RBC AUTO-ENTMCNC: 31.9 G/DL (ref 28.4–34.8)
MCV RBC AUTO: 89.4 FL (ref 82.6–102.9)
MONOCYTES # BLD: 11 % (ref 3–12)
NRBC AUTOMATED: 0 PER 100 WBC
PDW BLD-RTO: 13.1 % (ref 11.8–14.4)
PLATELET # BLD: 203 K/UL (ref 138–453)
PMV BLD AUTO: 9.1 FL (ref 8.1–13.5)
POTASSIUM SERPL-SCNC: 3.8 MMOL/L (ref 3.7–5.3)
RBC # BLD: 3.68 M/UL (ref 4.21–5.77)
SEG NEUTROPHILS: 67 % (ref 36–65)
SEGMENTED NEUTROPHILS ABSOLUTE COUNT: 3.91 K/UL (ref 1.5–8.1)
SODIUM BLD-SCNC: 139 MMOL/L (ref 135–144)
WBC # BLD: 5.9 K/UL (ref 3.5–11.3)

## 2022-05-10 PROCEDURE — 85025 COMPLETE CBC W/AUTO DIFF WBC: CPT

## 2022-05-10 PROCEDURE — 6370000000 HC RX 637 (ALT 250 FOR IP): Performed by: NURSE PRACTITIONER

## 2022-05-10 PROCEDURE — 51700 IRRIGATION OF BLADDER: CPT

## 2022-05-10 PROCEDURE — 36415 COLL VENOUS BLD VENIPUNCTURE: CPT

## 2022-05-10 PROCEDURE — 99283 EMERGENCY DEPT VISIT LOW MDM: CPT

## 2022-05-10 PROCEDURE — 80048 BASIC METABOLIC PNL TOTAL CA: CPT

## 2022-05-10 RX ORDER — HYDROCODONE BITARTRATE AND ACETAMINOPHEN 5; 325 MG/1; MG/1
1 TABLET ORAL ONCE
Status: COMPLETED | OUTPATIENT
Start: 2022-05-10 | End: 2022-05-10

## 2022-05-10 RX ADMIN — HYDROCODONE BITARTRATE AND ACETAMINOPHEN 1 TABLET: 5; 325 TABLET ORAL at 17:46

## 2022-05-10 ASSESSMENT — PAIN SCALES - GENERAL
PAINLEVEL_OUTOF10: 5
PAINLEVEL_OUTOF10: 5

## 2022-05-10 ASSESSMENT — PAIN - FUNCTIONAL ASSESSMENT: PAIN_FUNCTIONAL_ASSESSMENT: 0-10

## 2022-05-10 NOTE — ED PROVIDER NOTES
[facility] ED  eMERGENCY dEPARTMENT eNCOUnter      Pt Name: Honore Hatchet  MRN: [id number]  [place] 1955  Date of evaluation: 5/10/2022  Provider: SHELDON Bee CNP    CHIEF COMPLAINT       Chief Complaint   Patient presents with    Other     Surgery (cysto) scheduled for tomorrow; pt reports catheter was put in yesterday and it's clotted         HISTORY OF PRESENT ILLNESS  (Location/Symptom, Timing/Onset, Context/Setting, Quality, Duration, Modifying Factors, Severity.)   Honore Hatchet is a 78 y.o. male who presents to the emergency department via private auto for a boykin catheter issue. States he has been passing clots in his catheter. He noticed increased abd pressure and decreased urine output. He believes his catheter is clogged. He attempted to flush it at home without success. He has a cystoscopy tomorrow in [facility]. Denies fever, chills, back pain. Rates his pain 5/10 at this time. Nursing Notes were reviewed. ALLERGIES     Patient has no known allergies. CURRENT MEDICATIONS       Discharge Medication List as of 5/10/2022  6:26 PM      CONTINUE these medications which have NOT CHANGED    Details   trospium (SANCTURA) 20 MG tablet Take 1 tablet by mouth 2 times daily (before meals), Disp-60 tablet, R-0Normal      HYDROcodone-acetaminophen (NORCO) 5-325 MG per tablet Take 1 tablet by mouth every 4 hours as needed for Pain for up to 3 days. Intended supply: 3 days.  Take lowest dose possible to manage pain, Disp-18 tablet, R-0Normal      phenazopyridine (PYRIDIUM) 200 MG tablet Take 1 tablet by mouth 3 times daily as needed for Pain, Disp-15 tablet, R-0Normal      finasteride (PROSCAR) 5 MG tablet Take 1 tablet by mouth daily, Disp-90 tablet, R-3Normal      alfuzosin (UROXATRAL) 10 MG extended release tablet Take 1 tablet by mouth daily, Disp-90 tablet, R-3Normal             PAST MEDICAL HISTORY         Diagnosis Date    JOSE EDUARDO (acute kidney injury) ([facility] [address] 2022    Arthritis     Back pain     Hemorrhoids     High cholesterol     History of tinnitus     right ear    Prediabetes     no RX    Stomach ulcer        SURGICAL HISTORY           Procedure Laterality Date    COLONOSCOPY N/A 03/10/2022     COLORECTAL CANCER SCREENING, NOT HIGH RISK (N/A )    COLONOSCOPY N/A 3/10/2022    COLONOSCOPY POLYPECTOMY HOT BIOPSY performed by Kana Landeros DO at [address] COLONOSCOPY W/BIOPSY SINGLE/MULTIPLE N/A 2018    COLONOSCOPY WITH BIOPSY performed by Alva Pearson MD at [address]  2022    SUPRAPUBIC PROSTATECTOMY LAPAROSCOPIC ROBOTIC     PROSTATECTOMY N/A 2022    SUPRAPUBIC PROSTATECTOMY LAPAROSCOPIC ROBOTIC performed by Jane Jim MD at [address] XSEH,7+R/Q,QHONH N/A 491    OPEN UMBILICAL HERNIA REPAIR performed by Alva Pearson MD at [facility]  2018    open         FAMILY HISTORY           Problem Relation Age of Onset    No Known Problems Mother     Cancer Father     Alcohol Abuse Father     No Known Problems Sister     No Known Problems Sister     No Known Problems Sister      Family Status   Relation Name Status    Mother      Father      Sister  Alive    Sister  Alive    Sister  Alive        SOCIAL HISTORY      reports that he quit smoking about 32 years ago. He has a 10.00 pack-year smoking history. He has never used smokeless tobacco. He reports that he does not drink alcohol and does not use drugs. REVIEW OF SYSTEMS    (2-9 systems for level 4, 10 or more for level 5)     Review of Systems   Constitutional: Negative for chills, diaphoresis, fatigue and fever. Gastrointestinal: Positive for abdominal pain. Negative for diarrhea, nausea and vomiting. Genitourinary: Positive for decreased urine volume, difficulty urinating and hematuria. Negative for flank pain. Musculoskeletal: Negative for back pain. Skin: Negative for color change, rash and wound. Neurological: Negative for weakness. Except as noted above the remainder of the review of systems was reviewed and negative. PHYSICAL EXAM    (up to 7 for level 4, 8 or more for level 5)     ED Triage Vitals   BP Temp Temp Source Pulse Resp SpO2 Height Weight   05/10/22 1554 05/10/22 1552 05/10/22 1552 05/10/22 1552 05/10/22 1552 05/10/22 1552 -- --   (!) 140/81 98.1 °F (36.7 °C) Oral 102 15 97 %       Physical Exam  Vitals reviewed. Constitutional:       General: He is not in acute distress. Appearance: He is well-developed. He is not diaphoretic. Eyes:      General: No scleral icterus. Conjunctiva/sclera: Conjunctivae normal.   Cardiovascular:      Rate and Rhythm: Normal rate. Pulmonary:      Effort: Pulmonary effort is normal. No respiratory distress. Breath sounds: No stridor. Abdominal:      General: There is no distension. Palpations: Abdomen is soft. Tenderness: There is no abdominal tenderness. There is no guarding. Genitourinary:     Comments: Red urine/fluid noted in boykin catheter leg bag. Pt reported some of the fluid is from his flushing of the catheter. Musculoskeletal:      Cervical back: Neck supple. Skin:     General: Skin is warm and dry. Findings: No rash. Neurological:      Mental Status: He is alert and oriented to person, place, and time.    Psychiatric:         Behavior: Behavior normal.           DIAGNOSTIC RESULTS       LABS:  Labs Reviewed   CBC WITH AUTO DIFFERENTIAL - Abnormal; Notable for the following components:       Result Value    RBC 3.68 (*)     Hemoglobin 10.5 (*)     Hematocrit 32.9 (*)     Seg Neutrophils 67 (*)     Lymphocytes 18 (*)     Absolute Lymph # 1.05 (*)     All other components within normal limits   BASIC METABOLIC PANEL - Abnormal; Notable for the following components:    Glucose 124 (*)     CREATININE 1.21 (*) Calcium 8.5 (*)     Anion Gap 8 (*)     GFR Non- 60 (*)     All other components within normal limits       All other labs were within normal range or not returned as of this dictation. EMERGENCY DEPARTMENT COURSE and DIFFERENTIAL DIAGNOSIS/MDM:   Vitals:    Vitals:    05/10/22 1552 05/10/22 1554   BP:  (!) 140/81   Pulse: 102    Resp: 15    Temp: 98.1 °F (36.7 °C)    TempSrc: Oral    SpO2: 97%          MEDICATIONS GIVEN IN THE ED:  Medications   HYDROcodone-acetaminophen (NORCO) 5-325 MG per tablet 1 tablet (1 tablet Oral Given 5/10/22 1746)       CLINICAL DECISION MAKING:  The patient presented alert with a nontoxic appearance and was seen in conjunction with Dr. Rigo Rocha. His boykin catheter was irrigated. It was draining without difficulty prior to discharge. He reported improvement in his discomfort. Follow up with urology tomorrow as scheduled. Evaluation and treatment course in the ED, and plan of care upon discharge was discussed in length with the patient. Patient had no further questions prior to being discharged and was instructed to return to the ED for new or worsening symptoms. Care was provided during an unprecedented national emergency due to the novel coronavirus, Covid-19. FINAL IMPRESSION      1.  Problem with Boykin catheter, initial encounter [facility])            Problem List  Patient Active Problem List   Diagnosis Code    Morbid (severe) obesity due to excess calories ([facility] [address] D62.01    Umbilical hernia without obstruction or gangrene K42.9    Epigastric pain R10.13    Elevated PSA R97.20    Gross hematuria R31.0    BPH with obstruction/lower urinary tract symptoms N40.1, N13.8    Bladder calculi N21.0    BPH with urinary obstruction N40.1, N13.8    Acute cystitis with hematuria N30.01    High cholesterol E78.00    Acute urinary retention R33.8         DISPOSITION/PLAN   DISPOSITION Decision To Discharge 05/10/2022 06:25:21 PM      PATIENT REFERRED TO:   Bipin Solares SHELDON Hawkins - CNP  [address] [facility]  [address]  [phone]    Schedule an appointment as soon as possible for a visit       [facility] ED  [address] [facility]  [phone]    If symptoms worsen, As needed      DISCHARGE MEDICATIONS:     Discharge Medication List as of 5/10/2022  6:26 PM              (Please note that portions of this note were completed with a voice recognition program.  Efforts were made to edit the dictations but occasionally words are mis-transcribed.)    SHELDON Nieto - SHELDON Wei CNP  05/11/22 0656

## 2022-05-10 NOTE — ED NOTES
ED to inpatient nurses report     Chief Complaint   Patient presents with    Other     Surgery (cysto) scheduled for tomorrow; pt reports catheter was put in yesterday and it's clotted      Present to ED from home  LOC: alert and orientated to name, place, date  Vital signs   Vitals:    05/10/22 1552 05/10/22 1554   BP:  (!) 140/81   Pulse: 102    Resp: 15    Temp: 98.1 °F (36.7 °C)    TempSrc: Oral    SpO2: 97%       Oxygen Baseline room air 97%    Current needs required cath irrigation when clogged   LDAs:    Mobility: Independent  Pending ED orders: None  Present condition: stable  Code Status: full  Consults: None  [x]  Hospitalist  Completed  [x] yes [] no Who:   []  Medicine  Completed  [] yes [] No Who:   []  Cardiology  Completed  [] yes [] No Who:   []  GI   Completed  [] yes [] No Who:   []  Neurology  Completed  [] yes [] No Who:   []  Nephrology Completed  [] yes [] No Who:    []  Vascular  Completed  [] yes [] No Who:   []  Ortho  Completed  [] yes [] No Who:     []  Surgery  Completed  [] yes [] No Who:    [x]  Urology  Completed  [x] yes [] No Who:    []  CT Surgery Completed  [] yes [] No Who:    []  Other    Completed  [] yes [] No Who:  Interventions: Bladder irrigation with multiple clot removal   Important Events: Pt has cystoscopy with DR. Valleoj at [facility] tomorrow         Electronically signed by Kacie Marin RN on 5/10/2022 at 6:15 PM       Wilkie Bosworth, RN  05/10/22 0891

## 2022-05-11 ENCOUNTER — HOSPITAL ENCOUNTER (OUTPATIENT)
Age: 67
Setting detail: OUTPATIENT SURGERY
Discharge: HOME | End: 2022-05-11
Attending: SPECIALIST | Admitting: SPECIALIST
Payer: COMMERCIAL

## 2022-05-11 ENCOUNTER — ANESTHESIA (OUTPATIENT)
Dept: OPERATING ROOM | Age: 67
End: 2022-05-11
Payer: COMMERCIAL

## 2022-05-11 VITALS
WEIGHT: 257 LBS | RESPIRATION RATE: 13 BRPM | BODY MASS INDEX: 34.81 KG/M2 | SYSTOLIC BLOOD PRESSURE: 166 MMHG | DIASTOLIC BLOOD PRESSURE: 95 MMHG | HEART RATE: 55 BPM | TEMPERATURE: 96.9 F | OXYGEN SATURATION: 96 % | HEIGHT: 72 IN

## 2022-05-11 VITALS — OXYGEN SATURATION: 100 % | SYSTOLIC BLOOD PRESSURE: 136 MMHG | DIASTOLIC BLOOD PRESSURE: 65 MMHG | TEMPERATURE: 96.8 F

## 2022-05-11 PROCEDURE — 2580000003 HC RX 258: Performed by: ANESTHESIOLOGY

## 2022-05-11 PROCEDURE — 3700000001 HC ADD 15 MINUTES (ANESTHESIA): Performed by: SPECIALIST

## 2022-05-11 PROCEDURE — 3700000000 HC ANESTHESIA ATTENDED CARE: Performed by: SPECIALIST

## 2022-05-11 PROCEDURE — 7100000010 HC PHASE II RECOVERY - FIRST 15 MIN: Performed by: SPECIALIST

## 2022-05-11 PROCEDURE — 7100000001 HC PACU RECOVERY - ADDTL 15 MIN: Performed by: SPECIALIST

## 2022-05-11 PROCEDURE — 6360000002 HC RX W HCPCS: Performed by: ANESTHESIOLOGY

## 2022-05-11 PROCEDURE — 6370000000 HC RX 637 (ALT 250 FOR IP)

## 2022-05-11 PROCEDURE — 6360000002 HC RX W HCPCS

## 2022-05-11 PROCEDURE — 6360000002 HC RX W HCPCS: Performed by: SPECIALIST

## 2022-05-11 PROCEDURE — 3600000012 HC SURGERY LEVEL 2 ADDTL 15MIN: Performed by: SPECIALIST

## 2022-05-11 PROCEDURE — 2500000003 HC RX 250 WO HCPCS

## 2022-05-11 PROCEDURE — 3600000002 HC SURGERY LEVEL 2 BASE: Performed by: SPECIALIST

## 2022-05-11 PROCEDURE — 2709999900 HC NON-CHARGEABLE SUPPLY: Performed by: SPECIALIST

## 2022-05-11 PROCEDURE — 7100000011 HC PHASE II RECOVERY - ADDTL 15 MIN: Performed by: SPECIALIST

## 2022-05-11 PROCEDURE — 7100000000 HC PACU RECOVERY - FIRST 15 MIN: Performed by: SPECIALIST

## 2022-05-11 RX ORDER — SODIUM CHLORIDE 0.9 % (FLUSH) 0.9 %
5-40 SYRINGE (ML) INJECTION PRN
Status: DISCONTINUED | OUTPATIENT
Start: 2022-05-11 | End: 2022-05-11 | Stop reason: HOSPADM

## 2022-05-11 RX ORDER — LIDOCAINE HYDROCHLORIDE 10 MG/ML
INJECTION, SOLUTION INFILTRATION; PERINEURAL PRN
Status: DISCONTINUED | OUTPATIENT
Start: 2022-05-11 | End: 2022-05-11 | Stop reason: SDUPTHER

## 2022-05-11 RX ORDER — OXYCODONE HYDROCHLORIDE AND ACETAMINOPHEN 5; 325 MG/1; MG/1
TABLET ORAL
Status: COMPLETED
Start: 2022-05-11 | End: 2022-05-11

## 2022-05-11 RX ORDER — DIPHENHYDRAMINE HYDROCHLORIDE 50 MG/ML
12.5 INJECTION INTRAMUSCULAR; INTRAVENOUS
Status: DISCONTINUED | OUTPATIENT
Start: 2022-05-11 | End: 2022-05-11 | Stop reason: HOSPADM

## 2022-05-11 RX ORDER — PROMETHAZINE HYDROCHLORIDE 25 MG/ML
6.25 INJECTION, SOLUTION INTRAMUSCULAR; INTRAVENOUS EVERY 5 MIN PRN
Status: DISCONTINUED | OUTPATIENT
Start: 2022-05-11 | End: 2022-05-11 | Stop reason: HOSPADM

## 2022-05-11 RX ORDER — OXYCODONE HYDROCHLORIDE AND ACETAMINOPHEN 5; 325 MG/1; MG/1
1 TABLET ORAL
Status: COMPLETED | OUTPATIENT
Start: 2022-05-11 | End: 2022-05-11

## 2022-05-11 RX ORDER — MEPERIDINE HYDROCHLORIDE 50 MG/ML
12.5 INJECTION INTRAMUSCULAR; INTRAVENOUS; SUBCUTANEOUS EVERY 5 MIN PRN
Status: DISCONTINUED | OUTPATIENT
Start: 2022-05-11 | End: 2022-05-11 | Stop reason: HOSPADM

## 2022-05-11 RX ORDER — SODIUM CHLORIDE 0.9 % (FLUSH) 0.9 %
5-40 SYRINGE (ML) INJECTION EVERY 12 HOURS SCHEDULED
Status: DISCONTINUED | OUTPATIENT
Start: 2022-05-11 | End: 2022-05-11 | Stop reason: HOSPADM

## 2022-05-11 RX ORDER — ONDANSETRON 2 MG/ML
INJECTION INTRAMUSCULAR; INTRAVENOUS PRN
Status: DISCONTINUED | OUTPATIENT
Start: 2022-05-11 | End: 2022-05-11 | Stop reason: SDUPTHER

## 2022-05-11 RX ORDER — SODIUM CHLORIDE 9 MG/ML
25 INJECTION, SOLUTION INTRAVENOUS PRN
Status: DISCONTINUED | OUTPATIENT
Start: 2022-05-11 | End: 2022-05-11 | Stop reason: HOSPADM

## 2022-05-11 RX ORDER — IPRATROPIUM BROMIDE AND ALBUTEROL SULFATE 2.5; .5 MG/3ML; MG/3ML
1 SOLUTION RESPIRATORY (INHALATION)
Status: DISCONTINUED | OUTPATIENT
Start: 2022-05-11 | End: 2022-05-11 | Stop reason: HOSPADM

## 2022-05-11 RX ORDER — FENTANYL CITRATE 50 UG/ML
INJECTION, SOLUTION INTRAMUSCULAR; INTRAVENOUS PRN
Status: DISCONTINUED | OUTPATIENT
Start: 2022-05-11 | End: 2022-05-11 | Stop reason: SDUPTHER

## 2022-05-11 RX ORDER — DEXAMETHASONE SODIUM PHOSPHATE 10 MG/ML
INJECTION, SOLUTION INTRAMUSCULAR; INTRAVENOUS PRN
Status: DISCONTINUED | OUTPATIENT
Start: 2022-05-11 | End: 2022-05-11 | Stop reason: SDUPTHER

## 2022-05-11 RX ORDER — SODIUM CHLORIDE, SODIUM LACTATE, POTASSIUM CHLORIDE, CALCIUM CHLORIDE 600; 310; 30; 20 MG/100ML; MG/100ML; MG/100ML; MG/100ML
INJECTION, SOLUTION INTRAVENOUS CONTINUOUS
Status: DISCONTINUED | OUTPATIENT
Start: 2022-05-11 | End: 2022-05-11 | Stop reason: HOSPADM

## 2022-05-11 RX ORDER — LABETALOL 20 MG/4 ML (5 MG/ML) INTRAVENOUS SYRINGE
10
Status: DISCONTINUED | OUTPATIENT
Start: 2022-05-11 | End: 2022-05-11 | Stop reason: HOSPADM

## 2022-05-11 RX ORDER — SODIUM CHLORIDE 9 MG/ML
INJECTION, SOLUTION INTRAVENOUS PRN
Status: DISCONTINUED | OUTPATIENT
Start: 2022-05-11 | End: 2022-05-11 | Stop reason: HOSPADM

## 2022-05-11 RX ORDER — ONDANSETRON 2 MG/ML
4 INJECTION INTRAMUSCULAR; INTRAVENOUS
Status: DISCONTINUED | OUTPATIENT
Start: 2022-05-11 | End: 2022-05-11 | Stop reason: HOSPADM

## 2022-05-11 RX ORDER — OXYCODONE HYDROCHLORIDE AND ACETAMINOPHEN 5; 325 MG/1; MG/1
2 TABLET ORAL
Status: DISCONTINUED | OUTPATIENT
Start: 2022-05-11 | End: 2022-05-11 | Stop reason: HOSPADM

## 2022-05-11 RX ORDER — PROPOFOL 10 MG/ML
INJECTION, EMULSION INTRAVENOUS PRN
Status: DISCONTINUED | OUTPATIENT
Start: 2022-05-11 | End: 2022-05-11 | Stop reason: SDUPTHER

## 2022-05-11 RX ORDER — MORPHINE SULFATE 2 MG/ML
2 INJECTION, SOLUTION INTRAMUSCULAR; INTRAVENOUS EVERY 5 MIN PRN
Status: DISCONTINUED | OUTPATIENT
Start: 2022-05-11 | End: 2022-05-11 | Stop reason: HOSPADM

## 2022-05-11 RX ORDER — SODIUM CHLORIDE 9 MG/ML
INJECTION, SOLUTION INTRAVENOUS CONTINUOUS
Status: DISCONTINUED | OUTPATIENT
Start: 2022-05-11 | End: 2022-05-11 | Stop reason: HOSPADM

## 2022-05-11 RX ORDER — MIDAZOLAM HYDROCHLORIDE 2 MG/2ML
2 INJECTION, SOLUTION INTRAMUSCULAR; INTRAVENOUS
Status: DISCONTINUED | OUTPATIENT
Start: 2022-05-11 | End: 2022-05-11 | Stop reason: HOSPADM

## 2022-05-11 RX ORDER — MORPHINE SULFATE 2 MG/ML
INJECTION, SOLUTION INTRAMUSCULAR; INTRAVENOUS
Status: COMPLETED
Start: 2022-05-11 | End: 2022-05-11

## 2022-05-11 RX ADMIN — DEXAMETHASONE SODIUM PHOSPHATE 10 MG: 10 INJECTION INTRAMUSCULAR; INTRAVENOUS at 10:22

## 2022-05-11 RX ADMIN — FENTANYL CITRATE 50 MCG: 50 INJECTION, SOLUTION INTRAMUSCULAR; INTRAVENOUS at 10:21

## 2022-05-11 RX ADMIN — HYDROMORPHONE HYDROCHLORIDE 0.5 MG: 1 INJECTION, SOLUTION INTRAMUSCULAR; INTRAVENOUS; SUBCUTANEOUS at 11:32

## 2022-05-11 RX ADMIN — OXYCODONE HYDROCHLORIDE AND ACETAMINOPHEN 1 TABLET: 5; 325 TABLET ORAL at 11:20

## 2022-05-11 RX ADMIN — CEFAZOLIN SODIUM 2000 MG: 10 INJECTION, POWDER, FOR SOLUTION INTRAVENOUS at 10:19

## 2022-05-11 RX ADMIN — PROPOFOL 50 MG: 10 INJECTION, EMULSION INTRAVENOUS at 10:21

## 2022-05-11 RX ADMIN — PROPOFOL 200 MG: 10 INJECTION, EMULSION INTRAVENOUS at 10:12

## 2022-05-11 RX ADMIN — HYDROMORPHONE HYDROCHLORIDE 0.5 MG: 1 INJECTION, SOLUTION INTRAMUSCULAR; INTRAVENOUS; SUBCUTANEOUS at 11:13

## 2022-05-11 RX ADMIN — SODIUM CHLORIDE, POTASSIUM CHLORIDE, SODIUM LACTATE AND CALCIUM CHLORIDE: 600; 310; 30; 20 INJECTION, SOLUTION INTRAVENOUS at 10:08

## 2022-05-11 RX ADMIN — MORPHINE SULFATE 2 MG: 2 INJECTION, SOLUTION INTRAMUSCULAR; INTRAVENOUS at 10:57

## 2022-05-11 RX ADMIN — LIDOCAINE HYDROCHLORIDE 50 MG: 10 INJECTION, SOLUTION INFILTRATION; PERINEURAL at 10:12

## 2022-05-11 RX ADMIN — ONDANSETRON 4 MG: 2 INJECTION INTRAMUSCULAR; INTRAVENOUS at 10:33

## 2022-05-11 RX ADMIN — FENTANYL CITRATE 50 MCG: 50 INJECTION, SOLUTION INTRAMUSCULAR; INTRAVENOUS at 10:12

## 2022-05-11 ASSESSMENT — PULMONARY FUNCTION TESTS
PIF_VALUE: 21
PIF_VALUE: 0
PIF_VALUE: 5
PIF_VALUE: 10
PIF_VALUE: 15
PIF_VALUE: 15
PIF_VALUE: 9
PIF_VALUE: 14
PIF_VALUE: 6
PIF_VALUE: 15
PIF_VALUE: 5
PIF_VALUE: 22
PIF_VALUE: 15
PIF_VALUE: 6
PIF_VALUE: 14
PIF_VALUE: 10
PIF_VALUE: 21
PIF_VALUE: 24
PIF_VALUE: 17
PIF_VALUE: 3
PIF_VALUE: 1
PIF_VALUE: 5
PIF_VALUE: 0
PIF_VALUE: 16
PIF_VALUE: 21
PIF_VALUE: 0
PIF_VALUE: 15
PIF_VALUE: 16
PIF_VALUE: 1
PIF_VALUE: 0
PIF_VALUE: 2
PIF_VALUE: 20
PIF_VALUE: 2
PIF_VALUE: 15

## 2022-05-11 ASSESSMENT — PAIN DESCRIPTION - DESCRIPTORS: DESCRIPTORS: BURNING

## 2022-05-11 ASSESSMENT — PAIN DESCRIPTION - LOCATION: LOCATION: ABDOMEN

## 2022-05-11 ASSESSMENT — PAIN SCALES - GENERAL
PAINLEVEL_OUTOF10: 7
PAINLEVEL_OUTOF10: 8
PAINLEVEL_OUTOF10: 5
PAINLEVEL_OUTOF10: 0
PAINLEVEL_OUTOF10: 5
PAINLEVEL_OUTOF10: 7
PAINLEVEL_OUTOF10: 7

## 2022-05-11 ASSESSMENT — ENCOUNTER SYMPTOMS
ABDOMINAL PAIN: 1
NAUSEA: 0
BACK PAIN: 0
VOMITING: 0
COLOR CHANGE: 0
DIARRHEA: 0

## 2022-05-11 ASSESSMENT — PAIN DESCRIPTION - ORIENTATION: ORIENTATION: LOWER

## 2022-05-11 NOTE — OP NOTE
Operative Note      Patient: Francy Norwood  YOB: 1955  MRN: [id number]    Date of Procedure: 5/11/2022    Pre-Op Diagnosis: R31.0  GROSS HEMATURIA    Post-Op Diagnosis: Same       Procedure(s):  CYSTOSCOPY EVACUATION OF CLOTS WITH possible BLADDER BIOPSY AND FULGURATION    Surgeon(s):  Cornel Phillips MD    Assistant:   * No surgical staff found *    Anesthesia: General    Estimated Blood Loss (mL): Minimal    Complications: None    Specimens:   * No specimens in log *    Implants:  * No implants in log *      Drains:   [REMOVED] Closed/Suction Drain Left LUQ Bulb [address] [place] (Removed)       [REMOVED] Urinary Catheter Triple-lumen (Removed)       [REMOVED] Urinary Catheter 3 Way (Removed)   $ Urethral catheter insertion $ Not inserted for procedure 05/05/22 0414   Catheter Indications Urinary retention (acute or chronic), continuous bladder irrigation or bladder outlet obstruction 05/05/22 0414   Urine Color Bloody 05/05/22 0414       [REMOVED] Urinary Catheter 3 Way (Removed)   $ Urethral catheter insertion Inserted for procedure 05/05/22 0500   Catheter Indications Urinary retention (acute or chronic), continuous bladder irrigation or bladder outlet obstruction 05/08/22 1025   Site Assessment No urethral drainage 05/08/22 1025   Urine Color Other (Comment) 05/08/22 1025   Urine Appearance Sediment 05/08/22 1025   Collection Container Standard 05/08/22 5837   Securement Method Leg strap 05/08/22 0638   Catheter Care Completed Yes 05/07/22 1916   Catheter Best Practices  Drainage tube clipped to bed;Catheter secured to thigh; Bag below bladder;Bag not on floor;Drainage bag less than half full 05/08/22 0638   Status Patent 05/08/22 5322   Manual Irrigation Volume Input (mL) 30 mL 05/05/22 2300   Output (mL) 400 mL 05/08/22 1025       Findings: Prominent blood vessels in posterior bladder wall and friable apical prostate tissue    Detailed Description of Procedure:   INDICATIONS:  This is a 78 y.o. male presents today for a cystoscopy to evaluate his lower urinary tract because of persistent gross hematuria and clot acute urinary retention after Robotic assisted laparoscopic suprapubic prostatectomy. After risks, benefits and alternatives of the procedure were discussed with the patient, informed consent was obtained and the patient elected to proceed. The patient was given Ancef 2 gm IV on call to OR for antibiotic prophylaxis. Patient had EPC cuffs placed for VTE prophylaxis. DETAILS OF PROCEDURE:  The patient was prepped and draped in the usual sterile fashion. The rigid cystoscope was advanced through the urethra and into the bladder. The bladder was thoroughly inspected and the following was noted:  Residual Urine: mild  Urethra: normal appearing urethra with no masses, tenderness or lesions  Prostate: partially obstructing lateral lobes of prostate; median lobe present? no  Prostate urethra wide open but some friable residual apical tissue that bleeds easily was fulgerated with the Bugbee electrode. Bladder: No tumors or CIS noted. No bladder diverticulum. There was moderatemoderate trabeculation noted. Edematous trigone and very prominent posterior bladder wall blood vessels. There were fulgerated with the Bugbee electrode. Ureters: Clear efflux from both ureters. Orifices with normal configuration and location. The cystoscope was removed and a 20F indwelling boykin catheter placed at end of case. Balloon inflated to 10 mL. The patient tolerated the procedure well and was taken to recovery in good condition.       Electronically signed by Husam Hernandez MD on 5/11/2022 at 9:11 AM

## 2022-05-11 NOTE — ANESTHESIA POSTPROCEDURE EVALUATION
Department of Anesthesiology  Postprocedure Note    Patient: Vika Faith  MRN: [id number]  [place]: 1955  Date of evaluation: 5/11/2022  Time:  11:01 AM     Procedure Summary     Date: 05/11/22 Room / Location: [facility] OR 04 / [address] [place] [address]    Anesthesia Start: 1008 Anesthesia Stop: 4022    Procedure: CYSTOSCOPY EVACUATION OF CLOTS WITH BLADDER FULGURATION (N/A ) Diagnosis:       Gross hematuria      Acute cystitis with hematuria      (R31.0  GROSS HEMATURIA)      (N30.01  ACUTE CYSTITIS)    Surgeons: Marii Park MD Responsible Provider: Ariel Cutler MD    Anesthesia Type: general ASA Status: 2          Anesthesia Type: No value filed. Darrel Phase I: Darrel Score: 7    Darrel Phase II:      Last vitals: Reviewed and per EMR flowsheets.        Anesthesia Post Evaluation    Patient location during evaluation: PACU  Patient participation: complete - patient participated  Level of consciousness: awake and alert  Airway patency: patent  Nausea & Vomiting: no nausea and no vomiting  Complications: no  Cardiovascular status: hemodynamically stable  Respiratory status: nasal cannula and spontaneous ventilation  Hydration status: euvolemic  Multimodal analgesia pain management approach

## 2022-05-11 NOTE — ANESTHESIA PRE PROCEDURE
Department of Anesthesiology  Preprocedure Note       Name:  Elena Sifuentes   Age:  78 y.o.  :  1955                                          MRN:  [id number]         Date:  2022      Surgeon: Lien Velez):  Clarissa Willis MD    Procedure: Procedure(s):  CYSTOSCOPY EVACUATION OF CLOTS WITH possible BLADDER BIOPSY AND FULGURATION    Medications prior to admission:   Prior to Admission medications    Medication Sig Start Date End Date Taking? Authorizing Provider   trospium (SANCTURA) 20 MG tablet Take 1 tablet by mouth 2 times daily (before meals) 22   Chick Light P Blood, DO   HYDROcodone-acetaminophen (NORCO) 5-325 MG per tablet Take 1 tablet by mouth every 4 hours as needed for Pain for up to 3 days. Intended supply: 3 days.  Take lowest dose possible to manage pain 22  Chick Light P Blood, DO   phenazopyridine (PYRIDIUM) 200 MG tablet Take 1 tablet by mouth 3 times daily as needed for Pain 22   Clarissa Willis MD   finasteride (PROSCAR) 5 MG tablet Take 1 tablet by mouth daily 3/17/22   Clarissa Willis MD   alfuzosin Hannah Mantel) 10 MG extended release tablet Take 1 tablet by mouth daily 22   Clarissa Willis MD       Current medications:    Current Facility-Administered Medications   Medication Dose Route Frequency Provider Last Rate Last Admin    0.9 % sodium chloride infusion   IntraVENous Continuous Thomas Cummings MD        lactated ringers infusion   IntraVENous Continuous Thomas Cummings MD        sodium chloride flush 0.9 % injection 5-40 mL  5-40 mL IntraVENous 2 times per day Thomas Cummings MD        sodium chloride flush 0.9 % injection 5-40 mL  5-40 mL IntraVENous PRN Thomas Cummings MD        0.9 % sodium chloride infusion   IntraVENous PRN Thomas Cummings MD        ceFAZolin (ANCEF) 2000 mg in dextrose 5 % 50 mL IVPB  2,000 mg IntraVENous On Call to [address], [place]           Allergies:  No Known Allergies    Problem List:    Patient Active Problem List   Diagnosis Code  Morbid (severe) obesity due to excess calories (HCC) H84.00    Umbilical hernia without obstruction or gangrene K42.9    Epigastric pain R10.13    Elevated PSA R97.20    Gross hematuria R31.0    BPH with obstruction/lower urinary tract symptoms N40.1, N13.8    Bladder calculi N21.0    BPH with urinary obstruction N40.1, N13.8    Acute cystitis with hematuria N30.01    High cholesterol E78.00    Acute urinary retention R33.8       Past Medical History:        Diagnosis Date    JOSE EDUARDO [facility]acute kidney [facility]) ([facility] [address] 2022    Arthritis     Back pain     Hemorrhoids     High cholesterol     History of tinnitus     right ear    Prediabetes     no RX    Stomach ulcer        Past Surgical History:        Procedure Laterality Date    COLONOSCOPY N/A 03/10/2022     COLORECTAL CANCER SCREENING, NOT HIGH RISK (N/A )    COLONOSCOPY N/A 3/10/2022    COLONOSCOPY POLYPECTOMY HOT BIOPSY performed by Jennyfer Tijerina DO at [facility] SINGLE/MULTIPLE N/A 2018    COLONOSCOPY WITH BIOPSY performed by Nathan Bolivar MD at [address]  2022    SUPRAPUBIC PROSTATECTOMY LAPAROSCOPIC ROBOTIC     PROSTATECTOMY N/A 2022    SUPRAPUBIC PROSTATECTOMY LAPAROSCOPIC ROBOTIC performed by Harinder Barnett MD at [address],2+O/Z,SFOCB N/A 7737    OPEN UMBILICAL HERNIA REPAIR performed by Nathan Bolivar MD at [facility]  2018    open       Social History:    Social History     Tobacco Use    Smoking status: Former Smoker     Packs/day: 1.00     Years: 10.00     Pack years: 10.00     Quit date: 1990     Years since quittin.3    Smokeless tobacco: Never Used   Substance Use Topics    Alcohol use:  No                                Counseling given: Not Answered      Vital Signs (Current):   Vitals:    22 0809 22 0811   BP:  (!) 160/88 Pulse:  88   Resp:  20   Temp:  97.6 °F (36.4 °C)   SpO2:  98%   Weight: 257 lb (116.6 kg)    Height: 6' (1.829 m)                                               BP Readings from Last 3 Encounters:   05/11/22 (!) 160/88   05/10/22 (!) 140/81   05/08/22 119/64       NPO Status: Time of last liquid consumption: 2200                        Time of last solid consumption: 2200                        Date of last liquid consumption: 05/10/22                        Date of last solid food consumption: 05/10/22    BMI:   Wt Readings from Last 3 Encounters:   05/11/22 257 lb (116.6 kg)   05/05/22 274 lb (124.3 kg)   04/28/22 274 lb (124.3 kg)     Body mass index is 34.86 kg/m². CBC:   Lab Results   Component Value Date    WBC 5.9 05/10/2022    RBC 3.68 05/10/2022    HGB 10.5 05/10/2022    HCT 32.9 05/10/2022    MCV 89.4 05/10/2022    RDW 13.1 05/10/2022     05/10/2022       CMP:   Lab Results   Component Value Date     05/10/2022    K 3.8 05/10/2022     05/10/2022    CO2 26 05/10/2022    BUN 13 05/10/2022    CREATININE 1.21 05/10/2022    GFRAA >60 05/10/2022    LABGLOM 60 05/10/2022    GLUCOSE 124 05/10/2022    PROT 7.1 05/05/2022    CALCIUM 8.5 05/10/2022    BILITOT 0.40 05/05/2022    ALKPHOS 78 05/05/2022    AST 14 05/05/2022    ALT 16 05/05/2022       POC Tests: No results for input(s): POCGLU, POCNA, POCK, POCCL, POCBUN, POCHEMO, POCHCT in the last 72 hours.     Coags:   Lab Results   Component Value Date    PROTIME 14.6 05/05/2022    INR 1.1 05/05/2022    APTT 25.0 05/05/2022       HCG (If Applicable): No results found for: PREGTESTUR, PREGSERUM, HCG, HCGQUANT     ABGs: No results found for: PHART, PO2ART, HDX3SQN, CIM9ZGX, BEART, I7MHJPIZ     Type & Screen (If Applicable):  No results found for: LABABO, LABRH    Drug/Infectious Status (If Applicable):  No results found for: HIV, HEPCAB    COVID-19 Screening (If Applicable):   Lab Results   Component Value Date    COVID19 Detected 12/22/2020 Anesthesia Evaluation  Patient summary reviewed and Nursing notes reviewed  Airway: Mallampati: III  TM distance: >3 FB   Neck ROM: full  Mouth opening: > = 3 FB Dental:    (+) edentulous      Pulmonary:Negative Pulmonary ROS and normal exam                              ROS comment: -QUIT SMOKING 1990   Cardiovascular:Negative CV ROS          ECG reviewed                     ROS comment: -EKG - SR @ 67     Neuro/Psych:                ROS comment: -TINNITIS GI/Hepatic/Renal:   (+) PUD, morbid obesity          Endo/Other:    (+) : arthritis:., .                  ROS comment: -NPO AFTER MIDNIGHT  -NKDA Abdominal:             Vascular: negative vascular ROS. Other Findings:             Anesthesia Plan      general     ASA 2     (LMA)  Induction: intravenous. MIPS: Postoperative opioids intended and Prophylactic antiemetics administered. Anesthetic plan and risks discussed with patient. Plan discussed with CRNA.     Attending anesthesiologist reviewed and agrees with Blanca Parker MD   5/11/2022

## 2022-05-11 NOTE — H&P
Gisele Melendez MD [facility]    History and Physical    Patient:  Shyanne Bell  MRN: [id number]  YOB: 1955    CHIEF COMPLAINT:  Persistent gross hematuria     HISTORY OF PRESENT ILLNESS:   The patient is a 78 y.o. male who presents with: The patient presents with acute urinary retention and gross hematuria. A 24 F 3 way indwelling boykin catheter placed and over 1 liter obtained. Minimal clots were present. Patient shown how to irrigate his catheter as needed. He will need a Cystoscopy with possible clot evacuation, bladder fulgeration under GA in the near future.      Summary of old records:   Gross hematuria with smoking hx: 3/2/22 CT urogram: BPH and multiple bladder calculi (up to 8 mm), 3/17/22 cysto: trilobar BPH  BPH: alfuzosin 10 mg po qd 2/17/22; added finasteride 5 mg qd 3/17/22; 4/6/22 Robotic assisted (Davinci) suprapubic prostatectomy   Nocturia x 3  Elevated PSA pf 5.02 on 1/21/22; 3/17/22 bx negative (104 mL)  Penile glans irritation       Patient's old records, notes and chart reviewed and summarized above.      Past Medical History:    Past Medical History:   Diagnosis Date    JOSE EDUARDO (acute kidney injury) ([address] 5/5/2022    Arthritis     Back pain     Hemorrhoids     High cholesterol     History of tinnitus     right ear    Prediabetes     no RX    Stomach ulcer        Past Surgical History:    Past Surgical History:   Procedure Laterality Date    COLONOSCOPY N/A 03/10/2022     COLORECTAL CANCER SCREENING, NOT HIGH RISK (N/A )    COLONOSCOPY N/A 3/10/2022    COLONOSCOPY POLYPECTOMY HOT BIOPSY performed by Jagdish Madden DO at [address]/BIOPSY SINGLE/MULTIPLE N/A 4/11/2018    COLONOSCOPY WITH BIOPSY performed by Madina Mendenhall MD at [address]  04/06/2022    SUPRAPUBIC PROSTATECTOMY LAPAROSCOPIC ROBOTIC     PROSTATECTOMY N/A 4/6/2022    SUPRAPUBIC PROSTATECTOMY LAPAROSCOPIC ROBOTIC performed by Fabby Dasilva MD at [facility]    REPAIR UMBILICAL UXFU,1+F/T,TXYBW N/A 5036    OPEN UMBILICAL HERNIA REPAIR performed by Eliseo Ruiz MD at [address] [place] [address]  2018    open     Previous Urologic Surgery: see above  Medications Prior to Admission:    Prior to Admission medications    Medication Sig Start Date End Date Taking? Authorizing Provider   trospium (SANCTURA) 20 MG tablet Take 1 tablet by mouth 2 times daily (before meals) 22   Miguel VILLEDA Blood, DO   HYDROcodone-acetaminophen (NORCO) 5-325 MG per tablet Take 1 tablet by mouth every 4 hours as needed for Pain for up to 3 days. Intended supply: 3 days. Take lowest dose possible to manage pain 22  Miguel VILLEDA Blood, DO   phenazopyridine (PYRIDIUM) 200 MG tablet Take 1 tablet by mouth 3 times daily as needed for Pain 22   Cornel Phillips MD   finasteride (PROSCAR) 5 MG tablet Take 1 tablet by mouth daily 3/17/22   Cornel Phillips MD   alfuzosin Caralee Grieve) 10 MG extended release tablet Take 1 tablet by mouth daily 22   Cornel Phillips MD       Allergies:  Patient has no known allergies.     Social History:    Social History     Socioeconomic History    Marital status:      Spouse name: Not on file    Number of children: Not on file    Years of education: Not on file    Highest education level: Not on file   Occupational History    Not on file   Tobacco Use    Smoking status: Former Smoker     Packs/day: 1.00     Years: 10.00     Pack years: 10.00     Quit date: 1990     Years since quittin.3    Smokeless tobacco: Never Used   Vaping Use    Vaping Use: Never used   Substance and Sexual Activity    Alcohol use: No    Drug use: No    Sexual activity: Yes     Partners: Female   Other Topics Concern    Not on file   Social History Narrative    Not on file     Social Determinants of Health     Financial Resource Strain:     Difficulty of Paying Living Expenses: Not on file   Food Insecurity:     Worried About [address] in the Last Year: Not on file    Sri of Food in the Last Year: Not on file   Transportation Needs:     Lack of Transportation (Medical): Not on file    Lack of Transportation (Non-Medical):  Not on file   Physical Activity:     Days of Exercise per Week: Not on file    Minutes of Exercise per Session: Not on file   Stress:     Feeling of Stress : Not on file   Social Connections:     Frequency of Communication with Friends and Family: Not on file    Frequency of Social Gatherings with Friends and Family: Not on file    Attends [employer] Services: Not on file    Active Member of [address] [place] [address] or Organizations: Not on file    Attends Club or Organization Meetings: Not on file    Marital Status: Not on file   Intimate Partner Violence:     Fear of Current or Ex-Partner: Not on file    Emotionally Abused: Not on file    Physically Abused: Not on file    Sexually Abused: Not on file   Housing Stability:     Unable to Pay for Housing in the Last Year: Not on file    Number of [place] in the Last Year: Not on file    Unstable Housing in the Last Year: Not on file     Family History:    Family History   Problem Relation Age of Onset    No Known Problems Mother     Cancer Father     Alcohol Abuse Father     No Known Problems Sister     No Known Problems Sister     No Known Problems Sister      Previous Urologic Family history: none  REVIEW OF SYSTEMS:  Constitutional: negative  Eyes: negative  Respiratory: negative  Cardiovascular: negative  Gastrointestinal: negative  Genitourinary: see HPI  Musculoskeletal: negative  Skin: negative   Neurological: negative  Hematological/Lymphatic: negative  Psychological: negative    Physical Exam:      Patient Vitals for the past 24 hrs:   BP Temp Pulse Resp SpO2 Height Weight   05/11/22 0811 (!) 160/88 97.6 °F (36.4 °C) 88 20 98 % -- --   05/11/22 0809 -- -- -- -- -- 6' (1.829 m) 257 lb (116.6 kg)     Constitutional: Patient in no acute distress; Neuro: alert and oriented to person place and time. Psych: Mood and affect normal.  Skin: Normal  Lungs: Respiratory effort normal, CTA  Cardiovascular:  Normal peripheral pulses; R3 wo murmur  Abdomen: Soft, non-tender, non-distended with no CVA, flank pain, hepatosplenomegaly or hernia. Kidneys normal.  Bladder non-tender and not distended.   Lymphatics: no palpable lymphadenopathy       LABS:   Recent Labs     05/10/22  1645   WBC 5.9   HGB 10.5*   HCT 32.9*   MCV 89.4        Recent Labs     05/10/22  1645      K 3.8      CO2 26   BUN 13   CREATININE 1.21*     Lab Results   Component Value Date    PSA 5.02 (H) 01/21/2022    PSA 3.66 12/05/2017       Additional Lab/culture results:    Urinalysis:   Recent Labs     05/09/22  1409   COLORU red   CLARITYU cloudy   LEUKOCYTESUR large   BLOODU large        -----------------------------------------------------------------  Imaging Results:    Assessment and Plan   Impression:    Patient Active Problem List   Diagnosis    Morbid (severe) obesity due to excess calories ([address]    Umbilical hernia without obstruction or gangrene    Epigastric pain    Elevated PSA    Gross hematuria    BPH with obstruction/lower urinary tract symptoms    Bladder calculi    BPH with urinary obstruction    Acute cystitis with hematuria    High cholesterol    Acute urinary retention       Plan: Cystoscopy with possible clot evacuation, bladder fulgeration under Ricarda Trotter MD  9:10 AM 5/11/2022

## 2022-06-03 ENCOUNTER — HOSPITAL ENCOUNTER (OUTPATIENT)
Age: 67
Discharge: HOME | End: 2022-06-03
Payer: COMMERCIAL

## 2022-06-03 DIAGNOSIS — R31.9 URINARY TRACT INFECTION WITH HEMATURIA, SITE UNSPECIFIED: ICD-10-CM

## 2022-06-03 DIAGNOSIS — N39.0 URINARY TRACT INFECTION WITH HEMATURIA, SITE UNSPECIFIED: ICD-10-CM

## 2022-06-03 PROCEDURE — 87086 URINE CULTURE/COLONY COUNT: CPT

## 2022-06-04 LAB
CULTURE: NO GROWTH
SPECIMEN DESCRIPTION: NORMAL

## 2022-06-06 PROBLEM — N21.0 BLADDER CALCULI: Status: RESOLVED | Noted: 2022-03-17 | Resolved: 2022-06-06

## 2022-06-06 PROBLEM — N30.01 ACUTE CYSTITIS WITH HEMATURIA: Status: RESOLVED | Noted: 2022-05-05 | Resolved: 2022-06-06

## 2022-06-06 PROBLEM — R33.8 ACUTE URINARY RETENTION: Status: RESOLVED | Noted: 2022-05-09 | Resolved: 2022-06-06

## 2022-06-06 PROBLEM — R31.0 GROSS HEMATURIA: Status: RESOLVED | Noted: 2022-03-17 | Resolved: 2022-06-06

## 2022-06-06 PROBLEM — Z87.448 HISTORY OF BLADDER STONE: Status: ACTIVE | Noted: 2022-06-06

## 2022-06-20 PROBLEM — Z87.898 HISTORY OF GROSS HEMATURIA: Status: ACTIVE | Noted: 2022-06-06

## 2022-07-06 ENCOUNTER — HOSPITAL ENCOUNTER (OUTPATIENT)
Age: 67
Discharge: HOME | End: 2022-07-06
Payer: COMMERCIAL

## 2022-07-06 DIAGNOSIS — R97.20 ELEVATED PSA: ICD-10-CM

## 2022-07-06 LAB — PROSTATE SPECIFIC ANTIGEN: 1.86 NG/ML

## 2022-07-06 PROCEDURE — 36415 COLL VENOUS BLD VENIPUNCTURE: CPT

## 2022-07-06 PROCEDURE — 84153 ASSAY OF PSA TOTAL: CPT

## 2022-08-22 ENCOUNTER — HOSPITAL ENCOUNTER (OUTPATIENT)
Age: 67
Discharge: HOME | End: 2022-08-22
Payer: COMMERCIAL

## 2022-08-22 DIAGNOSIS — R31.9 URINARY TRACT INFECTION WITH HEMATURIA, SITE UNSPECIFIED: ICD-10-CM

## 2022-08-22 DIAGNOSIS — R97.20 ELEVATED PSA: ICD-10-CM

## 2022-08-22 DIAGNOSIS — N39.0 URINARY TRACT INFECTION WITH HEMATURIA, SITE UNSPECIFIED: ICD-10-CM

## 2022-08-22 LAB — PROSTATE SPECIFIC ANTIGEN: 1.52 NG/ML

## 2022-08-22 PROCEDURE — 84153 ASSAY OF PSA TOTAL: CPT

## 2022-08-22 PROCEDURE — 87086 URINE CULTURE/COLONY COUNT: CPT

## 2022-08-22 PROCEDURE — 36415 COLL VENOUS BLD VENIPUNCTURE: CPT

## 2022-08-23 LAB
CULTURE: NO GROWTH
SPECIMEN DESCRIPTION: NORMAL

## 2023-03-13 PROBLEM — R97.20 ELEVATED PSA: Status: RESOLVED | Noted: 2022-03-17 | Resolved: 2023-03-13

## 2023-03-13 PROBLEM — N21.0 BLADDER CALCULI: Status: RESOLVED | Noted: 2022-03-17 | Resolved: 2023-03-13

## 2023-03-13 PROBLEM — Z87.448 HISTORY OF BLADDER STONE: Status: ACTIVE | Noted: 2023-03-13

## 2023-09-08 ENCOUNTER — HOSPITAL ENCOUNTER (OUTPATIENT)
Age: 68
Discharge: HOME | End: 2023-09-08
Payer: MEDICARE

## 2023-09-08 DIAGNOSIS — Z87.898 HISTORY OF ELEVATED PSA: ICD-10-CM

## 2023-09-08 DIAGNOSIS — N40.1 BPH WITH OBSTRUCTION/LOWER URINARY TRACT SYMPTOMS: ICD-10-CM

## 2023-09-08 DIAGNOSIS — N13.8 BPH WITH OBSTRUCTION/LOWER URINARY TRACT SYMPTOMS: ICD-10-CM

## 2023-09-08 LAB — PSA SERPL-MCNC: 3.09 NG/ML

## 2023-09-08 PROCEDURE — 84153 ASSAY OF PSA TOTAL: CPT

## 2023-09-08 PROCEDURE — 36415 COLL VENOUS BLD VENIPUNCTURE: CPT

## 2023-09-11 ENCOUNTER — OFFICE VISIT (OUTPATIENT)
Age: 68
End: 2023-09-11
Payer: MEDICARE

## 2023-09-11 VITALS — BODY MASS INDEX: 34.06 KG/M2 | HEIGHT: 73 IN | WEIGHT: 257 LBS

## 2023-09-11 DIAGNOSIS — R97.20 RISING PSA LEVEL: ICD-10-CM

## 2023-09-11 DIAGNOSIS — N40.1 BPH WITH OBSTRUCTION/LOWER URINARY TRACT SYMPTOMS: Primary | ICD-10-CM

## 2023-09-11 DIAGNOSIS — Z87.448 HISTORY OF BLADDER STONE: ICD-10-CM

## 2023-09-11 DIAGNOSIS — R33.8 OTHER RETENTION OF URINE: ICD-10-CM

## 2023-09-11 DIAGNOSIS — N13.8 BPH WITH OBSTRUCTION/LOWER URINARY TRACT SYMPTOMS: Primary | ICD-10-CM

## 2023-09-11 LAB
BILIRUBIN, POC: NORMAL
BLOOD URINE, POC: NORMAL
CLARITY, POC: CLEAR
COLOR, POC: YELLOW
GLUCOSE URINE, POC: NORMAL
KETONES, POC: NORMAL
LEUKOCYTE EST, POC: NORMAL
NITRITE, POC: NORMAL
PH, POC: NORMAL
PROTEIN, POC: NORMAL
SPECIFIC GRAVITY, POC: NORMAL
UROBILINOGEN, POC: NORMAL

## 2023-09-11 PROCEDURE — 1123F ACP DISCUSS/DSCN MKR DOCD: CPT | Performed by: SPECIALIST

## 2023-09-11 PROCEDURE — G8427 DOCREV CUR MEDS BY ELIG CLIN: HCPCS | Performed by: SPECIALIST

## 2023-09-11 PROCEDURE — 99214 OFFICE O/P EST MOD 30 MIN: CPT | Performed by: SPECIALIST

## 2023-09-11 PROCEDURE — G8417 CALC BMI ABV UP PARAM F/U: HCPCS | Performed by: SPECIALIST

## 2023-09-11 PROCEDURE — 3017F COLORECTAL CA SCREEN DOC REV: CPT | Performed by: SPECIALIST

## 2023-09-11 PROCEDURE — 81003 URINALYSIS AUTO W/O SCOPE: CPT | Performed by: SPECIALIST

## 2023-09-11 PROCEDURE — 1036F TOBACCO NON-USER: CPT | Performed by: SPECIALIST

## 2023-09-11 RX ORDER — ALFUZOSIN HYDROCHLORIDE 10 MG/1
10 TABLET, EXTENDED RELEASE ORAL DAILY
Qty: 90 TABLET | Refills: 3 | Status: SHIPPED | OUTPATIENT
Start: 2023-09-11

## 2023-09-11 NOTE — PROGRESS NOTES
[facility], [address] [place] [address] Urology Office Progress Note    Patient:  Pa Zhao  YOB: 1955  Date: 9/11/2023    HISTORY OF PRESENT ILLNESS:   The patient is a 75 y.o. male  Patient's lower urinary tract symptoms are stable on Alfuzosin 10 mg po qd for BPH symptoms. Patient instructed to limit fluid intake 2-3 hours prior to bedtime to minimize nocturia or nocturnal incontinence. Patient's PSA is rising and he has an Elevated PSA velocity. Will repeat a free and total PSA in 3 months. Lower urinary tract symptoms: urgency, frequency, hesitancy, decreased urinary stream, nocturia, 2 times per night, and incomplete emptying.    Last AUA Symptom Score (QOL): 10 (2)  Today's AUA Symptom Score (QOL): 11 (3)    Summary of old records:   Gross hematuria with smoking hx: 3/2/22 CT urogram: BPH and multiple bladder calculi (up to 8 mm), 3/17/22 cysto: trilobar BPH  BPH: alfuzosin 10 mg po qd 2/17/22, stop 3/13/23?; finasteride 5 mg qd 3/17/22, stop 3/13/23; 4/6/22 Robotic assisted (Fatemeh Castro) suprapubic prostatectomy   Nocturia x 3  Elevated PSA pf 5.02 on 1/21/22; 3/17/22 bx negative (104 mL)  Penile glans irritation    Additional History: none    Procedures Today: N/A    Urinalysis today:  Results for POC orders placed in visit on 09/11/23   POCT Urinalysis No Micro (Auto)   Result Value Ref Range    Color, UA yellow     Clarity, UA clear     Glucose, UA POC neg     Bilirubin, UA      Ketones, UA      Spec Grav, UA      Blood, UA POC trace-lysed     pH, UA      Protein, UA POC neg     Urobilinogen, UA      Leukocytes, UA neg     Nitrite, UA neg        Last several PSA's:  Lab Results   Component Value Date    PSA 3.09 09/08/2023    PSA 1.52 08/22/2022    PSA 1.86 07/06/2022       Last total testosterone:  No results found for: \"TESTOSTERONE\"    Last BUN and creatinine:  Lab Results   Component Value Date    BUN 13 05/10/2022     Lab Results   Component Value Date

## 2023-12-06 ENCOUNTER — HOSPITAL ENCOUNTER (OUTPATIENT)
Age: 68
Discharge: HOME | End: 2023-12-06
Payer: MEDICARE

## 2023-12-06 DIAGNOSIS — R97.20 RISING PSA LEVEL: ICD-10-CM

## 2023-12-06 LAB — PSA SERPL-MCNC: 4 NG/ML (ref 0–4)

## 2023-12-06 PROCEDURE — 36415 COLL VENOUS BLD VENIPUNCTURE: CPT

## 2023-12-06 PROCEDURE — 84154 ASSAY OF PSA FREE: CPT

## 2023-12-06 PROCEDURE — 84153 ASSAY OF PSA TOTAL: CPT

## 2023-12-08 LAB
PSA FREE MFR SERPL: 20 %
PSA FREE SERPL-MCNC: 0.5 UG/L
PSA SERPL-MCNC: 2.5 UG/L (ref 0–4)

## 2023-12-11 ENCOUNTER — OFFICE VISIT (OUTPATIENT)
Age: 68
End: 2023-12-11
Payer: MEDICARE

## 2023-12-11 VITALS — BODY MASS INDEX: 34.06 KG/M2 | HEIGHT: 73 IN | WEIGHT: 257 LBS

## 2023-12-11 DIAGNOSIS — Z87.448 HISTORY OF BLADDER STONE: ICD-10-CM

## 2023-12-11 DIAGNOSIS — R33.8 OTHER RETENTION OF URINE: ICD-10-CM

## 2023-12-11 DIAGNOSIS — R97.20 RISING PSA LEVEL: Primary | ICD-10-CM

## 2023-12-11 DIAGNOSIS — N13.8 BPH WITH OBSTRUCTION/LOWER URINARY TRACT SYMPTOMS: ICD-10-CM

## 2023-12-11 DIAGNOSIS — N40.1 BPH WITH OBSTRUCTION/LOWER URINARY TRACT SYMPTOMS: ICD-10-CM

## 2023-12-11 PROCEDURE — G8484 FLU IMMUNIZE NO ADMIN: HCPCS | Performed by: SPECIALIST

## 2023-12-11 PROCEDURE — 81003 URINALYSIS AUTO W/O SCOPE: CPT | Performed by: SPECIALIST

## 2023-12-11 PROCEDURE — G8417 CALC BMI ABV UP PARAM F/U: HCPCS | Performed by: SPECIALIST

## 2023-12-11 PROCEDURE — 3017F COLORECTAL CA SCREEN DOC REV: CPT | Performed by: SPECIALIST

## 2023-12-11 PROCEDURE — G8427 DOCREV CUR MEDS BY ELIG CLIN: HCPCS | Performed by: SPECIALIST

## 2023-12-11 PROCEDURE — 99214 OFFICE O/P EST MOD 30 MIN: CPT | Performed by: SPECIALIST

## 2023-12-11 PROCEDURE — 1123F ACP DISCUSS/DSCN MKR DOCD: CPT | Performed by: SPECIALIST

## 2023-12-11 PROCEDURE — 1036F TOBACCO NON-USER: CPT | Performed by: SPECIALIST

## 2023-12-11 NOTE — PROGRESS NOTES
Irina Slater [address] Urology Office Progress Note    Patient:  Jessy Bunch  YOB: 1955  Date: 12/11/2023    HISTORY OF PRESENT ILLNESS:   The patient is a 75 y.o. male  Patient's lower urinary tract symptoms are stable on Alfuzosin 10 mg po qd for BPH symptoms. Patient's PSA is rising and is now 4.0. Will order a prostate MRI to evaluate for clinically significant prostate cancer. Patient instructed to limit fluid intake 2-3 hours prior to bedtime to minimize nocturia or nocturnal incontinence. Lower urinary tract symptoms: urgency, frequency, decreased urinary stream, nocturia, 3 times per night, and incomplete emptying.    Last AUA Symptom Score (QOL): 11 (3)  Today's AUA Symptom Score (QOL): 12 (3)    Summary of old records:   Gross hematuria with smoking hx: 3/2/22 CT urogram: BPH and multiple bladder calculi (up to 8 mm), 3/17/22 cysto: trilobar BPH  BPH: alfuzosin 10 mg po qd 2/17/22, stop 3/13/23?; finasteride 5 mg qd 3/17/22, stop 3/13/23; 4/6/22 Robotic assisted (Vann Ply) suprapubic prostatectomy   Nocturia x 3  Elevated PSA pf 5.02 on 1/21/22; 3/17/22 bx negative (104 mL)  Penile glans irritation    Additional History: none    Procedures Today: N/A    Urinalysis today:  Results for POC orders placed in visit on 12/11/23   POCT Urinalysis No Micro (Auto)   Result Value Ref Range    Color, UA yellow     Clarity, UA clear     Glucose, UA POC neg     Bilirubin, UA      Ketones, UA      Spec Grav, UA      Blood, UA POC trace-lysed     pH, UA      Protein, UA POC neg     Urobilinogen, UA      Leukocytes, UA neg     Nitrite, UA neg        Last several PSA's:  Lab Results   Component Value Date    PSA 4.00 12/06/2023    PSA 2.5 12/06/2023    PSA 3.09 09/08/2023       Last total testosterone:  No results found for: \"TESTOSTERONE\"    Last BUN and creatinine:  Lab Results   Component Value Date    BUN 13 05/10/2022     Lab Results   Component Value Date

## 2023-12-26 ENCOUNTER — HOSPITAL ENCOUNTER (OUTPATIENT)
Dept: MRI IMAGING | Age: 68
Discharge: HOME | End: 2023-12-28
Attending: SPECIALIST
Payer: MEDICARE

## 2023-12-26 DIAGNOSIS — R97.20 RISING PSA LEVEL: ICD-10-CM

## 2023-12-26 LAB
CREAT SERPL-MCNC: 1.1 MG/DL (ref 0.7–1.2)
GFR SERPL CREATININE-BSD FRML MDRD: >60 ML/MIN/1.73M2

## 2023-12-26 PROCEDURE — 6360000004 HC RX CONTRAST MEDICATION: Performed by: SPECIALIST

## 2023-12-26 PROCEDURE — 72197 MRI PELVIS W/O & W/DYE: CPT

## 2023-12-26 PROCEDURE — 82565 ASSAY OF CREATININE: CPT

## 2023-12-26 PROCEDURE — 36415 COLL VENOUS BLD VENIPUNCTURE: CPT

## 2023-12-26 PROCEDURE — A9579 GAD-BASE MR CONTRAST NOS,1ML: HCPCS | Performed by: SPECIALIST

## 2023-12-26 PROCEDURE — 2580000003 HC RX 258: Performed by: SPECIALIST

## 2023-12-26 RX ORDER — SODIUM CHLORIDE 0.9 % (FLUSH) 0.9 %
10 SYRINGE (ML) INJECTION 2 TIMES DAILY
Status: DISCONTINUED | OUTPATIENT
Start: 2023-12-26 | End: 2023-12-29 | Stop reason: HOSPADM

## 2023-12-26 RX ORDER — 0.9 % SODIUM CHLORIDE 0.9 %
100 INTRAVENOUS SOLUTION INTRAVENOUS ONCE
Status: COMPLETED | OUTPATIENT
Start: 2023-12-26 | End: 2023-12-26

## 2023-12-26 RX ADMIN — GADOTERIDOL 20 ML: 279.3 INJECTION, SOLUTION INTRAVENOUS at 16:24

## 2023-12-26 RX ADMIN — SODIUM CHLORIDE 50 ML: 9 INJECTION, SOLUTION INTRAVENOUS at 16:24

## 2023-12-29 RX ORDER — CIPROFLOXACIN 500 MG/1
500 TABLET, FILM COATED ORAL 2 TIMES DAILY
Qty: 6 TABLET | Refills: 0 | Status: SHIPPED | OUTPATIENT
Start: 2023-12-29

## 2023-12-29 RX ORDER — CEPHALEXIN 500 MG/1
500 CAPSULE ORAL 3 TIMES DAILY
Qty: 9 CAPSULE | Refills: 0 | Status: SHIPPED | OUTPATIENT
Start: 2023-12-29

## 2024-02-06 ENCOUNTER — HOSPITAL ENCOUNTER (OUTPATIENT)
Dept: ULTRASOUND IMAGING | Age: 69
Setting detail: OUTPATIENT SURGERY
Discharge: HOME | End: 2024-02-08
Attending: SPECIALIST
Payer: MEDICARE

## 2024-02-06 ENCOUNTER — HOSPITAL ENCOUNTER (OUTPATIENT)
Age: 69
Setting detail: OUTPATIENT SURGERY
Discharge: HOME | End: 2024-02-06
Attending: SPECIALIST | Admitting: SPECIALIST
Payer: MEDICARE

## 2024-02-06 ENCOUNTER — ANESTHESIA (OUTPATIENT)
Dept: OPERATING ROOM | Age: 69
End: 2024-02-06
Payer: MEDICARE

## 2024-02-06 ENCOUNTER — ANESTHESIA EVENT (OUTPATIENT)
Dept: OPERATING ROOM | Age: 69
End: 2024-02-06
Payer: MEDICARE

## 2024-02-06 VITALS
RESPIRATION RATE: 23 BRPM | WEIGHT: 259 LBS | DIASTOLIC BLOOD PRESSURE: 92 MMHG | SYSTOLIC BLOOD PRESSURE: 145 MMHG | TEMPERATURE: 97.2 F | HEART RATE: 52 BPM | BODY MASS INDEX: 35.08 KG/M2 | OXYGEN SATURATION: 100 % | HEIGHT: 72 IN

## 2024-02-06 DIAGNOSIS — R97.20 ELEVATED PSA: ICD-10-CM

## 2024-02-06 DIAGNOSIS — R93.89 ABNORMAL MRI: ICD-10-CM

## 2024-02-06 PROBLEM — R93.5 ABNORMAL MRI, PELVIS: Status: ACTIVE | Noted: 2024-02-06

## 2024-02-06 PROCEDURE — 76942 ECHO GUIDE FOR BIOPSY: CPT

## 2024-02-06 PROCEDURE — 3700000000 HC ANESTHESIA ATTENDED CARE: Performed by: SPECIALIST

## 2024-02-06 PROCEDURE — 2500000003 HC RX 250 WO HCPCS

## 2024-02-06 PROCEDURE — 7100000011 HC PHASE II RECOVERY - ADDTL 15 MIN: Performed by: SPECIALIST

## 2024-02-06 PROCEDURE — 88344 IMHCHEM/IMCYTCHM EA MLT ANTB: CPT

## 2024-02-06 PROCEDURE — 3700000001 HC ADD 15 MINUTES (ANESTHESIA): Performed by: SPECIALIST

## 2024-02-06 PROCEDURE — 7100000010 HC PHASE II RECOVERY - FIRST 15 MIN: Performed by: SPECIALIST

## 2024-02-06 PROCEDURE — 2709999900 HC NON-CHARGEABLE SUPPLY: Performed by: SPECIALIST

## 2024-02-06 PROCEDURE — 2580000003 HC RX 258: Performed by: ANESTHESIOLOGY

## 2024-02-06 PROCEDURE — 3600000012 HC SURGERY LEVEL 2 ADDTL 15MIN: Performed by: SPECIALIST

## 2024-02-06 PROCEDURE — 6360000002 HC RX W HCPCS: Performed by: SPECIALIST

## 2024-02-06 PROCEDURE — 3600000002 HC SURGERY LEVEL 2 BASE: Performed by: SPECIALIST

## 2024-02-06 PROCEDURE — 88305 TISSUE EXAM BY PATHOLOGIST: CPT

## 2024-02-06 PROCEDURE — 6360000002 HC RX W HCPCS

## 2024-02-06 PROCEDURE — 2500000003 HC RX 250 WO HCPCS: Performed by: SPECIALIST

## 2024-02-06 RX ORDER — METOCLOPRAMIDE HYDROCHLORIDE 5 MG/ML
10 INJECTION INTRAMUSCULAR; INTRAVENOUS
Status: DISCONTINUED | OUTPATIENT
Start: 2024-02-06 | End: 2024-02-06 | Stop reason: HOSPADM

## 2024-02-06 RX ORDER — SODIUM CHLORIDE, SODIUM LACTATE, POTASSIUM CHLORIDE, CALCIUM CHLORIDE 600; 310; 30; 20 MG/100ML; MG/100ML; MG/100ML; MG/100ML
INJECTION, SOLUTION INTRAVENOUS CONTINUOUS
Status: DISCONTINUED | OUTPATIENT
Start: 2024-02-06 | End: 2024-02-06 | Stop reason: HOSPADM

## 2024-02-06 RX ORDER — DIPHENHYDRAMINE HYDROCHLORIDE 50 MG/ML
12.5 INJECTION INTRAMUSCULAR; INTRAVENOUS
Status: DISCONTINUED | OUTPATIENT
Start: 2024-02-06 | End: 2024-02-06 | Stop reason: HOSPADM

## 2024-02-06 RX ORDER — SODIUM CHLORIDE 0.9 % (FLUSH) 0.9 %
5-40 SYRINGE (ML) INJECTION EVERY 12 HOURS SCHEDULED
Status: DISCONTINUED | OUTPATIENT
Start: 2024-02-06 | End: 2024-02-06 | Stop reason: HOSPADM

## 2024-02-06 RX ORDER — HYDRALAZINE HYDROCHLORIDE 20 MG/ML
10 INJECTION INTRAMUSCULAR; INTRAVENOUS
Status: DISCONTINUED | OUTPATIENT
Start: 2024-02-06 | End: 2024-02-06 | Stop reason: HOSPADM

## 2024-02-06 RX ORDER — KETAMINE HCL IN NACL, ISO-OSM 100MG/10ML
SYRINGE (ML) INJECTION PRN
Status: DISCONTINUED | OUTPATIENT
Start: 2024-02-06 | End: 2024-02-06 | Stop reason: SDUPTHER

## 2024-02-06 RX ORDER — PROPOFOL 10 MG/ML
INJECTION, EMULSION INTRAVENOUS CONTINUOUS PRN
Status: DISCONTINUED | OUTPATIENT
Start: 2024-02-06 | End: 2024-02-06 | Stop reason: SDUPTHER

## 2024-02-06 RX ORDER — SODIUM CHLORIDE 9 MG/ML
INJECTION, SOLUTION INTRAVENOUS PRN
Status: DISCONTINUED | OUTPATIENT
Start: 2024-02-06 | End: 2024-02-06 | Stop reason: HOSPADM

## 2024-02-06 RX ORDER — SODIUM CHLORIDE 0.9 % (FLUSH) 0.9 %
5-40 SYRINGE (ML) INJECTION PRN
Status: DISCONTINUED | OUTPATIENT
Start: 2024-02-06 | End: 2024-02-06 | Stop reason: HOSPADM

## 2024-02-06 RX ORDER — LIDOCAINE HYDROCHLORIDE 10 MG/ML
INJECTION, SOLUTION INFILTRATION; PERINEURAL PRN
Status: DISCONTINUED | OUTPATIENT
Start: 2024-02-06 | End: 2024-02-06 | Stop reason: HOSPADM

## 2024-02-06 RX ORDER — MEPERIDINE HYDROCHLORIDE 50 MG/ML
12.5 INJECTION INTRAMUSCULAR; INTRAVENOUS; SUBCUTANEOUS EVERY 5 MIN PRN
Status: DISCONTINUED | OUTPATIENT
Start: 2024-02-06 | End: 2024-02-06 | Stop reason: HOSPADM

## 2024-02-06 RX ORDER — DROPERIDOL 2.5 MG/ML
0.62 INJECTION, SOLUTION INTRAMUSCULAR; INTRAVENOUS
Status: DISCONTINUED | OUTPATIENT
Start: 2024-02-06 | End: 2024-02-06 | Stop reason: HOSPADM

## 2024-02-06 RX ADMIN — Medication 1000 MG: at 13:06

## 2024-02-06 RX ADMIN — SODIUM CHLORIDE, POTASSIUM CHLORIDE, SODIUM LACTATE AND CALCIUM CHLORIDE: 600; 310; 30; 20 INJECTION, SOLUTION INTRAVENOUS at 11:28

## 2024-02-06 RX ADMIN — Medication 20 MG: at 13:21

## 2024-02-06 RX ADMIN — PROPOFOL 100 MCG/KG/MIN: 10 INJECTION, EMULSION INTRAVENOUS at 12:55

## 2024-02-06 RX ADMIN — Medication 30 MG: at 13:11

## 2024-02-06 ASSESSMENT — PAIN - FUNCTIONAL ASSESSMENT: PAIN_FUNCTIONAL_ASSESSMENT: NONE - DENIES PAIN

## 2024-02-06 NOTE — ANESTHESIA PRE PROCEDURE
Department of Anesthesiology  Preprocedure Note       Name:  Milan Cox   Age:  68 y.o.  :  1955                                          MRN:  [id number]         Date:  2024      Surgeon: Surgeon(s):  Suleiman Wynne MD    Procedure: Procedure(s):  MR FUSION PROSTATE BIOPSY ULTRASOUND    Medications prior to admission:   Prior to Admission medications    Medication Sig Start Date End Date Taking? Authorizing Provider   cephALEXin (KEFLEX) 500 MG capsule Take 1 capsule by mouth 3 times daily Take first dose night prior to prostate biopsy and then three times a day thereafter until completed 23   Suleiman Wynne MD   ciprofloxacin (CIPRO) 500 MG tablet Take 1 tablet by mouth 2 times daily Take first dose night prior to prostate biopsy and then twice a day thereafter until completed 23   Suleiman Wynne MD   alfuzosin (UROXATRAL) 10 MG extended release tablet Take 1 tablet by mouth daily 23   Suleiman Wynne MD       Current medications:    No current facility-administered medications for this encounter.       Allergies:  No Known Allergies    Problem List:    Patient Active Problem List   Diagnosis Code   • Morbid (severe) obesity due to excess calories ([facility]) E66.01   • Umbilical hernia without obstruction or gangrene K42.9   • Epigastric pain R10.13   • Rising PSA level R97.20   • BPH with obstruction/lower urinary tract symptoms N40.1, N13.8   • High cholesterol E78.00   • Other retention of urine R33.8   • History of elevated PSA Z87.898   • History of bladder stone Z87.448       Past Medical History:        Diagnosis Date   • JOSE EDUARDO (acute kidney injury) (Former[facility]) 2022   • Arthritis    • Back pain    • BPH with obstruction/lower urinary tract symptoms    • Elevated PSA    • Hemorrhoids    • High cholesterol    • History of tinnitus     right ear   • Prediabetes     no RX   • Stomach ulcer    • Under care of team     PCP: Justin CHUNG [place],

## 2024-02-06 NOTE — OP NOTE
the mid gland for a total of 16 standard specimens. There was no hypoechoic nodule.  These core specimens were sent off for permanent pathology.  We used 1 percent lidocaine to inject around the neurovascular bundle bilaterally. After completion of the biopsy we then removed the ultrasound probe.  There is no evidence of brisk bleeding per the rectum.  The patient tolerated the procedure well.  His anesthesia was reversed.  He was then taken to recovery in stable condition.  He was discharged home in stable condition and instructed to follow-up for review of his pathology results.  He is instructed to call if he has any fevers shaking chills.    Dr. Wynne was present for the entire case.    Disposition Follow-up: Patient will follow up in 1-2 weeks for review of pathology results    Alyse Perkins DO, PGY-4  Urology Resident    I was present and scrubbed for the critical portions or entire case.  Electronically signed by Suleiman Wynne MD on 2/6/2024 at 1:30 PM

## 2024-02-06 NOTE — DISCHARGE INSTRUCTIONS
You may experience blood in stool, urine, and semen.  This should resolve over the next couple days.    Tylenol for pain control  Pt ok to discharge home in good condition  No heavy lifting, >10 lbs for today  Pt should avoid strenuous activity for today  Pt should walk moderately at home  Pt ok to shower   Pt may resume diet as tolerated  Pt should take Rx as directed: cipro that was previously perscribed.  No driving while on narcotics  Please call attending physician or hospital  with questions  Call or Present to ED if fever (> 101F), intractable nausea vomiting or pain.    Pt should follow up with Dr. Wynne, in 1 week for pathology results OR you will receive a call from the office. Call to confirm appointment    FINISH TAKING YOUR ANTIBIOTICS TO COMPLETION WITHOUT MISSING ANY DOSAGES  HOLD ANY AND ALL BLOOD THINNERS, INCLUDING ASPIRIN, AT LEAST 72 HRS BEFORE RESUMING

## 2024-02-06 NOTE — H&P
History and Physical    Patient:  Milan Cox  MRN: [id number]  YOB: 1955    CHIEF COMPLAINT:  Elevated PSA    HISTORY OF PRESENT ILLNESS:   The patient is a 68 y.o. male who presents with elevated PSA to 4.0, MRI prostate shows PIRADS 3 lesion in right apex anterior transition zone, irregular shaped heterogeneous T2 hypointense observation in most of right mid gland and base transition zone.     Past Medical History:    Past Medical History:   Diagnosis Date    JOSE EDUARDO (acute kidney injury) (HCC) 05/05/2022    Arthritis     Back pain     BPH with obstruction/lower urinary tract symptoms     Elevated PSA     Hemorrhoids     High cholesterol     History of tinnitus     right ear    Prediabetes     no RX    Stomach ulcer     Under care of team     PCP: Justin CHUNG [place], last visit 2022       Past Surgical History:    Past Surgical History:   Procedure Laterality Date    COLONOSCOPY N/A 03/10/2022    COLONOSCOPY POLYPECTOMY HOT BIOPSY performed by Harley Bautista DO at [facility]    CYSTOSCOPY N/A 05/11/2022    CYSTOSCOPY EVACUATION OF CLOTS WITH BLADDER FULGURATION performed by Suleiman Wynne MD at [facility]    VA COLONOSCOPY W/BIOPSY SINGLE/MULTIPLE N/A 04/11/2018    COLONOSCOPY WITH BIOPSY performed by Margo Linder MD at [facility]    VA RPR UMBILICAL HRNA 5 YRS/> REDUCIBLE N/A 01/04/2018    OPEN UMBILICAL HERNIA REPAIR performed by Margo Linder MD at [facility]    PROSTATECTOMY N/A 04/06/2022    SUPRAPUBIC PROSTATECTOMY LAPAROSCOPIC ROBOTIC performed by Suleiman Wynne MD at [facility]    TONSILLECTOMY AND ADENOIDECTOMY      UMBILICAL HERNIA REPAIR  01/04/2018    open       Medications Prior to Admission:    Prior to Admission medications    Medication Sig Start Date End Date Taking? Authorizing Provider   cephALEXin (KEFLEX) 500 MG capsule Take 1 capsule by mouth 3 times daily Take first dose night prior to prostate biopsy and then three times

## 2024-02-07 NOTE — ANESTHESIA POSTPROCEDURE EVALUATION
POST- ANESTHESIA EVALUATION       Pt Name: Milan Cox  MRN: [id number]  YOB: 1955  Date of evaluation: 2/7/2024  Time:  2:11 PM      BP (!) 145/92   Pulse 52   Temp 97.2 °F (36.2 °C) (Temporal)   Resp 23   Ht 1.829 m (6')   Wt 117.5 kg (259 lb)   SpO2 100%   BMI 35.13 kg/m²      Consciousness Level  Awake  Cardiopulmonary Status  Stable  Pain Adequately Treated YES  Nausea / Vomiting  NO  Adequate Hydration  YES  Anesthesia Related Complications NONE      Electronically signed by Bonifacio Pretty MD on 2/7/2024 at 2:11 PM     Department of Anesthesiology  Postprocedure Note    Patient: Milan Cox  MRN: [id number]  YOB: 1955  Date of evaluation: 2/7/2024    Procedure Summary       Date: 02/06/24 Room / Location: [address]    Anesthesia Start: 1243 Anesthesia Stop: 1334    Procedure: MR FUSION PROSTATE BIOPSY ULTRASOUND Diagnosis:       Abnormal MRI      Elevated PSA      (Abnormal MRI [R93.89])      (Elevated PSA [R97.20])    Surgeons: Suleiman Wynne MD Responsible Provider: Bonifacio Pretty MD    Anesthesia Type: MAC ASA Status: 2            Anesthesia Type: No value filed.    Darrel Phase I: Darrel Score: 10    Darrel Phase II: Darrel Score: 10    Anesthesia Post Evaluation    No notable events documented.

## 2024-02-09 LAB — SURGICAL PATHOLOGY REPORT: NORMAL

## 2024-02-20 ENCOUNTER — OFFICE VISIT (OUTPATIENT)
Dept: FAMILY MEDICINE CLINIC | Age: 69
End: 2024-02-20
Payer: MEDICARE

## 2024-02-20 VITALS
BODY MASS INDEX: 34.51 KG/M2 | SYSTOLIC BLOOD PRESSURE: 118 MMHG | HEART RATE: 84 BPM | WEIGHT: 260.4 LBS | TEMPERATURE: 97.6 F | OXYGEN SATURATION: 96 % | HEIGHT: 73 IN | DIASTOLIC BLOOD PRESSURE: 80 MMHG

## 2024-02-20 DIAGNOSIS — Z13.29 THYROID DISORDER SCREENING: ICD-10-CM

## 2024-02-20 DIAGNOSIS — Z11.4 ENCOUNTER FOR SCREENING FOR HIV: ICD-10-CM

## 2024-02-20 DIAGNOSIS — N40.1 BPH WITH OBSTRUCTION/LOWER URINARY TRACT SYMPTOMS: ICD-10-CM

## 2024-02-20 DIAGNOSIS — Z00.00 INITIAL MEDICARE ANNUAL WELLNESS VISIT: Primary | ICD-10-CM

## 2024-02-20 DIAGNOSIS — N13.8 BPH WITH OBSTRUCTION/LOWER URINARY TRACT SYMPTOMS: ICD-10-CM

## 2024-02-20 DIAGNOSIS — Z11.59 NEED FOR HEPATITIS C SCREENING TEST: ICD-10-CM

## 2024-02-20 DIAGNOSIS — Z13.220 LIPID SCREENING: ICD-10-CM

## 2024-02-20 DIAGNOSIS — Z13.1 DIABETES MELLITUS SCREENING: ICD-10-CM

## 2024-02-20 PROCEDURE — G0438 PPPS, INITIAL VISIT: HCPCS | Performed by: NURSE PRACTITIONER

## 2024-02-20 PROCEDURE — 3017F COLORECTAL CA SCREEN DOC REV: CPT | Performed by: NURSE PRACTITIONER

## 2024-02-20 PROCEDURE — 1123F ACP DISCUSS/DSCN MKR DOCD: CPT | Performed by: NURSE PRACTITIONER

## 2024-02-20 PROCEDURE — G8484 FLU IMMUNIZE NO ADMIN: HCPCS | Performed by: NURSE PRACTITIONER

## 2024-02-20 SDOH — ECONOMIC STABILITY: FOOD INSECURITY: WITHIN THE PAST 12 MONTHS, YOU WORRIED THAT YOUR FOOD WOULD RUN OUT BEFORE YOU GOT MONEY TO BUY MORE.: NEVER TRUE

## 2024-02-20 SDOH — ECONOMIC STABILITY: HOUSING INSECURITY
IN THE LAST 12 MONTHS, WAS THERE A TIME WHEN YOU DID NOT HAVE A STEADY PLACE TO SLEEP OR SLEPT IN A SHELTER (INCLUDING NOW)?: NO

## 2024-02-20 SDOH — ECONOMIC STABILITY: INCOME INSECURITY: HOW HARD IS IT FOR YOU TO PAY FOR THE VERY BASICS LIKE FOOD, HOUSING, MEDICAL CARE, AND HEATING?: NOT HARD AT ALL

## 2024-02-20 SDOH — ECONOMIC STABILITY: FOOD INSECURITY: WITHIN THE PAST 12 MONTHS, THE FOOD YOU BOUGHT JUST DIDN'T LAST AND YOU DIDN'T HAVE MONEY TO GET MORE.: NEVER TRUE

## 2024-02-20 ASSESSMENT — PATIENT HEALTH QUESTIONNAIRE - PHQ9
SUM OF ALL RESPONSES TO PHQ QUESTIONS 1-9: 0
2. FEELING DOWN, DEPRESSED OR HOPELESS: 0
SUM OF ALL RESPONSES TO PHQ9 QUESTIONS 1 & 2: 0
1. LITTLE INTEREST OR PLEASURE IN DOING THINGS: 0
SUM OF ALL RESPONSES TO PHQ QUESTIONS 1-9: 0

## 2024-02-20 ASSESSMENT — LIFESTYLE VARIABLES
HOW OFTEN DO YOU HAVE A DRINK CONTAINING ALCOHOL: NEVER
HOW MANY STANDARD DRINKS CONTAINING ALCOHOL DO YOU HAVE ON A TYPICAL DAY: PATIENT DOES NOT DRINK

## 2024-02-20 NOTE — PROGRESS NOTES
Medicare Annual Wellness Visit    Milan Cox is here for Medicare AWV and Sweats (Night sweats )    Assessment & Plan   Initial Medicare annual wellness visit  Recommendations for Preventive Services Due: see orders and patient instructions/AVS.  Recommended screening schedule for the next 5-10 years is provided to the patient in written form: see Patient Instructions/AVS.     No follow-ups on file.     Subjective       BPH, elevated PSA, follows with urology Dr. Wynne, recent biopsy stable, repeat psa in 6 months, currently treated with alfuzosin 10 mg    Colon polyp, precancerous, on colonoscopy from 2022 repeat colonoscopy to 2027    Night sweats, ongoing for years. Bed soaked at times, denies snoring    Former smoker, approx 10 pack years          Patient's complete Health Risk Assessment and screening values have been reviewed and are found in Flowsheets. The following problems were reviewed today and where indicated follow up appointments were made and/or referrals ordered.    Positive Risk Factor Screenings with Interventions:                Activity, Diet, and Weight:  On average, how many days per week do you engage in moderate to strenuous exercise (like a brisk walk)?: 7 days  On average, how many minutes do you engage in exercise at this level?: 30 min    Do you eat balanced/healthy meals regularly?: (!) No    Body mass index is 34.36 kg/m². (!) Abnormal    Do you eat balanced/healthy meals regularly Interventions:  low carbohydrate diet  Obesity Interventions:  wear a pedometer and walk at least 10,000 steps/day               Safety:  Do you have any tripping hazards - loose or unsecured carpets or rugs?: (!) Yes  Interventions:  See AVS for additional education material     Advanced Directives:  Do you have a Living Will?: (!) No    Intervention:                       Objective   Vitals:    02/20/24 1227   BP: 118/80   Site: Right Upper Arm   Position: Sitting   Cuff Size: Large Adult

## 2024-02-20 NOTE — PATIENT INSTRUCTIONS
Eating Healthy Foods: Care Instructions  With every meal, you can make healthy food choices. Try to eat a variety of fruits, vegetables, whole grains, lean proteins, and low-fat dairy products. This can help you get the right balance of nutrients, including vitamins and minerals. Small changes add up over time. You can start by adding one healthy food to your meals each day.    Try to make half your plate fruits and vegetables, one-fourth whole grains, and one-fourth lean proteins. Try including dairy with your meals.   Eat more fruits and vegetables. Try to have them with most meals and snacks.   Foods for healthy eating    Fruits    These can be fresh, frozen, canned, or dried.  Try to choose whole fruit rather than fruit juice.  Eat a variety of colors.    Vegetables    These can be fresh, frozen, canned, or dried.  Beans, peas, and lentils count too.    Whole grains    Choose whole-grain breads, cereals, and noodles.  Try brown rice.    Lean proteins    These can include lean meat, poultry, fish, and eggs.  You can also have tofu, beans, peas, lentils, nuts, and seeds.    Dairy    Try milk, yogurt, and cheese.  Choose low-fat or fat-free when you can.  If you need to, use lactose-free milk or fortified plant-based milk products, such as soy milk.    Water    Drink water when you're thirsty.  Limit sugar-sweetened drinks, including soda, fruit drinks, and sports drinks.  Where can you learn more?  Go to https://www.[employer].net/patientEd and enter T756 to learn more about \"Eating Healthy Foods: Care Instructions.\"  Current as of: September 20, 2023               Content Version: 13.9  © 2040-9006 [employer].   Care instructions adapted under license by [employer]. If you have questions about a medical condition or this instruction, always ask your healthcare professional. [employer] disclaims any warranty or liability for your use of this information.           [employer]

## 2024-02-21 ENCOUNTER — HOSPITAL ENCOUNTER (OUTPATIENT)
Age: 69
Discharge: HOME | End: 2024-02-21
Payer: MEDICARE

## 2024-02-21 DIAGNOSIS — N13.8 BPH WITH OBSTRUCTION/LOWER URINARY TRACT SYMPTOMS: ICD-10-CM

## 2024-02-21 DIAGNOSIS — Z11.59 NEED FOR HEPATITIS C SCREENING TEST: ICD-10-CM

## 2024-02-21 DIAGNOSIS — Z00.00 INITIAL MEDICARE ANNUAL WELLNESS VISIT: ICD-10-CM

## 2024-02-21 DIAGNOSIS — Z13.29 THYROID DISORDER SCREENING: ICD-10-CM

## 2024-02-21 DIAGNOSIS — Z13.220 LIPID SCREENING: ICD-10-CM

## 2024-02-21 DIAGNOSIS — Z11.4 ENCOUNTER FOR SCREENING FOR HIV: ICD-10-CM

## 2024-02-21 DIAGNOSIS — Z13.1 DIABETES MELLITUS SCREENING: ICD-10-CM

## 2024-02-21 DIAGNOSIS — N40.1 BPH WITH OBSTRUCTION/LOWER URINARY TRACT SYMPTOMS: ICD-10-CM

## 2024-02-21 DIAGNOSIS — R73.01 IFG (IMPAIRED FASTING GLUCOSE): Primary | ICD-10-CM

## 2024-02-21 LAB
ALBUMIN SERPL-MCNC: 4.1 G/DL (ref 3.5–5.2)
ALBUMIN/GLOB SERPL: 1 {RATIO} (ref 1–2.5)
ALP SERPL-CCNC: 79 U/L (ref 40–129)
ALT SERPL-CCNC: 14 U/L (ref 10–50)
ANION GAP SERPL CALCULATED.3IONS-SCNC: 9 MMOL/L (ref 9–16)
AST SERPL-CCNC: 17 U/L (ref 10–50)
BILIRUB SERPL-MCNC: 0.7 MG/DL (ref 0–1.2)
BUN SERPL-MCNC: 13 MG/DL (ref 8–23)
CALCIUM SERPL-MCNC: 9 MG/DL (ref 8.6–10.4)
CHLORIDE SERPL-SCNC: 106 MMOL/L (ref 98–107)
CHOLEST SERPL-MCNC: 186 MG/DL (ref 0–199)
CHOLESTEROL/HDL RATIO: 6
CO2 SERPL-SCNC: 27 MMOL/L (ref 20–31)
CREAT SERPL-MCNC: 1.2 MG/DL (ref 0.7–1.2)
ERYTHROCYTE [DISTWIDTH] IN BLOOD BY AUTOMATED COUNT: 12.9 % (ref 11.8–14.4)
GFR SERPL CREATININE-BSD FRML MDRD: >60 ML/MIN/1.73M2
GLUCOSE SERPL-MCNC: 104 MG/DL (ref 74–99)
HCT VFR BLD AUTO: 47.5 % (ref 40.7–50.3)
HCV AB SERPL QL IA: NONREACTIVE
HDLC SERPL-MCNC: 31 MG/DL
HGB BLD-MCNC: 15.2 G/DL (ref 13–17)
HIV 1+2 AB+HIV1 P24 AG SERPL QL IA: NONREACTIVE
LDLC SERPL CALC-MCNC: 126 MG/DL (ref 0–100)
MCH RBC QN AUTO: 28.8 PG (ref 25.2–33.5)
MCHC RBC AUTO-ENTMCNC: 32 G/DL (ref 28.4–34.8)
MCV RBC AUTO: 90 FL (ref 82.6–102.9)
NRBC BLD-RTO: 0 PER 100 WBC
PLATELET # BLD AUTO: 233 K/UL (ref 138–453)
PMV BLD AUTO: 10.1 FL (ref 8.1–13.5)
POTASSIUM SERPL-SCNC: 3.8 MMOL/L (ref 3.7–5.3)
PROT SERPL-MCNC: 7.2 G/DL (ref 6.6–8.7)
RBC # BLD AUTO: 5.28 M/UL (ref 4.21–5.77)
SODIUM SERPL-SCNC: 142 MMOL/L (ref 136–145)
TRIGL SERPL-MCNC: 145 MG/DL
TSH SERPL DL<=0.05 MIU/L-ACNC: 0.58 UIU/ML (ref 0.27–4.2)
VLDLC SERPL CALC-MCNC: 29 MG/DL
WBC OTHER # BLD: 6.4 K/UL (ref 3.5–11.3)

## 2024-02-21 PROCEDURE — 86803 HEPATITIS C AB TEST: CPT

## 2024-02-21 PROCEDURE — 80061 LIPID PANEL: CPT

## 2024-02-21 PROCEDURE — 36415 COLL VENOUS BLD VENIPUNCTURE: CPT

## 2024-02-21 PROCEDURE — 84443 ASSAY THYROID STIM HORMONE: CPT

## 2024-02-21 PROCEDURE — 80053 COMPREHEN METABOLIC PANEL: CPT

## 2024-02-21 PROCEDURE — 87389 HIV-1 AG W/HIV-1&-2 AB AG IA: CPT

## 2024-02-21 PROCEDURE — 85027 COMPLETE CBC AUTOMATED: CPT

## 2024-02-22 DIAGNOSIS — E78.5 HYPERLIPIDEMIA, UNSPECIFIED HYPERLIPIDEMIA TYPE: Primary | ICD-10-CM

## 2024-02-22 RX ORDER — ROSUVASTATIN CALCIUM 10 MG/1
10 TABLET, COATED ORAL DAILY
Qty: 90 TABLET | Refills: 1 | Status: SHIPPED | OUTPATIENT
Start: 2024-02-22

## 2024-07-08 ENCOUNTER — HOSPITAL ENCOUNTER (OUTPATIENT)
Age: 69
Setting detail: SPECIMEN
Discharge: HOME | End: 2024-07-08

## 2024-07-08 DIAGNOSIS — Z87.448 HISTORY OF BLADDER STONE: ICD-10-CM

## 2024-07-08 DIAGNOSIS — Z87.448 HISTORY OF BLADDER STONE: Primary | ICD-10-CM

## 2024-07-11 LAB
STONE COMPOSITION: NORMAL
STONE DESCRIPTION: NORMAL
STONE MASS: 473 MG

## 2024-07-17 DIAGNOSIS — Z87.898 HISTORY OF ELEVATED PSA: ICD-10-CM

## 2024-07-17 DIAGNOSIS — N13.8 BPH WITH OBSTRUCTION/LOWER URINARY TRACT SYMPTOMS: ICD-10-CM

## 2024-07-17 DIAGNOSIS — R97.20 RISING PSA LEVEL: Primary | ICD-10-CM

## 2024-07-17 DIAGNOSIS — N40.1 BPH WITH OBSTRUCTION/LOWER URINARY TRACT SYMPTOMS: ICD-10-CM

## 2024-07-17 DIAGNOSIS — Z87.448 HISTORY OF BLADDER STONE: ICD-10-CM

## 2024-07-19 ENCOUNTER — HOSPITAL ENCOUNTER (OUTPATIENT)
Age: 69
Discharge: HOME | End: 2024-07-19
Payer: MEDICARE

## 2024-07-19 DIAGNOSIS — R73.01 IFG (IMPAIRED FASTING GLUCOSE): ICD-10-CM

## 2024-07-19 DIAGNOSIS — R97.20 RISING PSA LEVEL: ICD-10-CM

## 2024-07-19 DIAGNOSIS — E78.5 HYPERLIPIDEMIA, UNSPECIFIED HYPERLIPIDEMIA TYPE: ICD-10-CM

## 2024-07-19 DIAGNOSIS — N13.8 BPH WITH OBSTRUCTION/LOWER URINARY TRACT SYMPTOMS: ICD-10-CM

## 2024-07-19 DIAGNOSIS — N40.1 BPH WITH OBSTRUCTION/LOWER URINARY TRACT SYMPTOMS: ICD-10-CM

## 2024-07-19 DIAGNOSIS — Z87.898 HISTORY OF ELEVATED PSA: ICD-10-CM

## 2024-07-19 DIAGNOSIS — Z87.448 HISTORY OF BLADDER STONE: ICD-10-CM

## 2024-07-19 LAB
ALT SERPL-CCNC: 12 U/L (ref 10–50)
AST SERPL-CCNC: 17 U/L (ref 10–50)
EST. AVERAGE GLUCOSE BLD GHB EST-MCNC: 117 MG/DL
HBA1C MFR BLD: 5.7 % (ref 4–6)
PSA SERPL-MCNC: 4 NG/ML (ref 0–4)
URATE SERPL-MCNC: 7 MG/DL (ref 3.4–7)

## 2024-07-19 PROCEDURE — 84460 ALANINE AMINO (ALT) (SGPT): CPT

## 2024-07-19 PROCEDURE — 84153 ASSAY OF PSA TOTAL: CPT

## 2024-07-19 PROCEDURE — 36415 COLL VENOUS BLD VENIPUNCTURE: CPT

## 2024-07-19 PROCEDURE — 84550 ASSAY OF BLOOD/URIC ACID: CPT

## 2024-07-19 PROCEDURE — 83036 HEMOGLOBIN GLYCOSYLATED A1C: CPT

## 2024-07-19 PROCEDURE — 84450 TRANSFERASE (AST) (SGOT): CPT

## 2024-07-28 DIAGNOSIS — Z13.220 LIPID SCREENING: Primary | ICD-10-CM

## 2024-08-19 ENCOUNTER — HOSPITAL ENCOUNTER (OUTPATIENT)
Age: 69
Discharge: HOME | End: 2024-08-19
Payer: MEDICARE

## 2024-08-19 DIAGNOSIS — R97.20 RISING PSA LEVEL: ICD-10-CM

## 2024-08-19 LAB
PSA FREE MFR SERPL: 17.5 %
PSA FREE SERPL-MCNC: 0.7 UG/L
PSA SERPL-MCNC: 4 NG/ML (ref 0–4)

## 2024-08-19 PROCEDURE — 36415 COLL VENOUS BLD VENIPUNCTURE: CPT

## 2024-08-19 PROCEDURE — 84154 ASSAY OF PSA FREE: CPT

## 2024-08-22 ENCOUNTER — OFFICE VISIT (OUTPATIENT)
Age: 69
End: 2024-08-22
Payer: MEDICARE

## 2024-08-22 DIAGNOSIS — Z87.448 HISTORY OF BLADDER STONE: ICD-10-CM

## 2024-08-22 DIAGNOSIS — E78.5 HYPERLIPIDEMIA, UNSPECIFIED HYPERLIPIDEMIA TYPE: ICD-10-CM

## 2024-08-22 DIAGNOSIS — N40.1 BPH WITH OBSTRUCTION/LOWER URINARY TRACT SYMPTOMS: ICD-10-CM

## 2024-08-22 DIAGNOSIS — R97.20 ELEVATED PSA: ICD-10-CM

## 2024-08-22 DIAGNOSIS — N13.8 BPH WITH OBSTRUCTION/LOWER URINARY TRACT SYMPTOMS: ICD-10-CM

## 2024-08-22 DIAGNOSIS — N42.32 ATYPICAL SMALL ACINAR PROLIFERATION OF PROSTATE: Primary | ICD-10-CM

## 2024-08-22 PROCEDURE — G8417 CALC BMI ABV UP PARAM F/U: HCPCS | Performed by: SPECIALIST

## 2024-08-22 PROCEDURE — 81003 URINALYSIS AUTO W/O SCOPE: CPT | Performed by: SPECIALIST

## 2024-08-22 PROCEDURE — 1123F ACP DISCUSS/DSCN MKR DOCD: CPT | Performed by: SPECIALIST

## 2024-08-22 PROCEDURE — G8427 DOCREV CUR MEDS BY ELIG CLIN: HCPCS | Performed by: SPECIALIST

## 2024-08-22 PROCEDURE — 1036F TOBACCO NON-USER: CPT | Performed by: SPECIALIST

## 2024-08-22 PROCEDURE — 99214 OFFICE O/P EST MOD 30 MIN: CPT | Performed by: SPECIALIST

## 2024-08-22 PROCEDURE — 3017F COLORECTAL CA SCREEN DOC REV: CPT | Performed by: SPECIALIST

## 2024-08-22 RX ORDER — ROSUVASTATIN CALCIUM 10 MG/1
10 TABLET, COATED ORAL DAILY
Qty: 90 TABLET | Refills: 1 | Status: SHIPPED | OUTPATIENT
Start: 2024-08-22

## 2024-08-22 NOTE — PROGRESS NOTES
Suleiman Wynne MD FACS    [facility] Urology Office Progress Note    Patient:  Milan Cox  YOB: 1955  Date: 8/22/2024    HISTORY OF PRESENT ILLNESS:   The patient is a 68 y.o. male  Patient's lower urinary tract symptoms are stable on Alfuzosin 10 mg po qd for BPH symptoms.   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  Will repeat a free and total PSA in 1 year.   Lower urinary tract symptoms: frequency, hesitancy, decreased urinary stream, nocturia, 1 times per night, and feelings of ROXANNA.   Last AUA Symptom Score (QOL): 12 (3)  Today's AUA Symptom Score (QOL): 8 (2)    Summary of old records:   Gross hematuria with smoking hx: 3/2/22 CT urogram: BPH and multiple bladder calculi (up to 8 mm), 3/17/22 cysto: trilobar BPH  BPH: alfuzosin 10 mg po qd 2/17/22, stop 3/13/23?; finasteride 5 mg qd 3/17/22, stop 3/13/23; 4/6/22 Robotic assisted (Davinci) suprapubic prostatectomy   Nocturia x 3  Elevated PSA pf 5.02 on 1/21/22; 3/17/22 bx negative (104 mL); 12/26/23 prostate MRI (74.5 mL; 1.7 cm PIRADS 4, RA ant TZ; large PIRADS 3 RM, RB TZ); 2/6/24 MR fusion bx: ASAP RA target   Penile glans irritation    Additional History: none    Procedures Today: N/A    Urinalysis today:  Results for POC orders placed in visit on 08/22/24   POCT Urinalysis No Micro (Auto)   Result Value Ref Range    Color, UA yellow     Clarity, UA clear     Glucose, UA POC neg     Bilirubin, UA      Ketones, UA      Spec Grav, UA      Blood, UA POC trace-intact     pH, UA      Protein, UA POC neg     Urobilinogen, UA      Leukocytes, UA neg     Nitrite, UA neg        Last several PSA's:  Lab Results   Component Value Date    PSA 4.00 08/19/2024    PSA 4.00 07/19/2024    PSA 4.00 12/06/2023    PSA 2.5 12/06/2023       Last total testosterone:  No results found for: \"TESTOSTERONE\"    Last BUN and creatinine:  Lab

## 2024-09-10 ENCOUNTER — OFFICE VISIT (OUTPATIENT)
Dept: FAMILY MEDICINE CLINIC | Age: 69
End: 2024-09-10
Payer: MEDICARE

## 2024-09-10 VITALS
SYSTOLIC BLOOD PRESSURE: 126 MMHG | TEMPERATURE: 97.5 F | WEIGHT: 247.2 LBS | BODY MASS INDEX: 32.76 KG/M2 | DIASTOLIC BLOOD PRESSURE: 68 MMHG | OXYGEN SATURATION: 98 % | HEIGHT: 73 IN | HEART RATE: 67 BPM

## 2024-09-10 DIAGNOSIS — G47.9 SLEEP DISTURBANCE: ICD-10-CM

## 2024-09-10 DIAGNOSIS — G47.10 HYPERSOMNIA, UNSPECIFIED: ICD-10-CM

## 2024-09-10 DIAGNOSIS — N13.8 BPH WITH OBSTRUCTION/LOWER URINARY TRACT SYMPTOMS: ICD-10-CM

## 2024-09-10 DIAGNOSIS — E78.5 HYPERLIPIDEMIA, UNSPECIFIED HYPERLIPIDEMIA TYPE: Primary | ICD-10-CM

## 2024-09-10 DIAGNOSIS — N40.1 BPH WITH OBSTRUCTION/LOWER URINARY TRACT SYMPTOMS: ICD-10-CM

## 2024-09-10 PROCEDURE — 99213 OFFICE O/P EST LOW 20 MIN: CPT | Performed by: NURSE PRACTITIONER

## 2024-09-10 PROCEDURE — 3017F COLORECTAL CA SCREEN DOC REV: CPT | Performed by: NURSE PRACTITIONER

## 2024-09-10 PROCEDURE — 1123F ACP DISCUSS/DSCN MKR DOCD: CPT | Performed by: NURSE PRACTITIONER

## 2024-09-10 PROCEDURE — G8417 CALC BMI ABV UP PARAM F/U: HCPCS | Performed by: NURSE PRACTITIONER

## 2024-09-10 PROCEDURE — G8427 DOCREV CUR MEDS BY ELIG CLIN: HCPCS | Performed by: NURSE PRACTITIONER

## 2024-09-10 PROCEDURE — 1036F TOBACCO NON-USER: CPT | Performed by: NURSE PRACTITIONER

## 2024-09-10 ASSESSMENT — ENCOUNTER SYMPTOMS
SINUS PAIN: 0
BACK PAIN: 0
COUGH: 0
SORE THROAT: 0
EYE PAIN: 0
SHORTNESS OF BREATH: 0
NAUSEA: 0
ABDOMINAL PAIN: 0
VOMITING: 0
DIARRHEA: 0

## 2024-09-14 ENCOUNTER — HOSPITAL ENCOUNTER (OUTPATIENT)
Age: 69
Discharge: HOME | End: 2024-09-14
Payer: MEDICARE

## 2024-09-14 DIAGNOSIS — Z13.220 LIPID SCREENING: ICD-10-CM

## 2024-09-14 DIAGNOSIS — E78.5 HYPERLIPIDEMIA, UNSPECIFIED HYPERLIPIDEMIA TYPE: ICD-10-CM

## 2024-09-14 LAB
CHOLEST SERPL-MCNC: 110 MG/DL (ref 0–199)
CHOLESTEROL/HDL RATIO: 3
HDLC SERPL-MCNC: 41 MG/DL
LDLC SERPL CALC-MCNC: 49 MG/DL (ref 0–100)
TRIGL SERPL-MCNC: 96 MG/DL
VLDLC SERPL CALC-MCNC: 19 MG/DL

## 2024-09-14 PROCEDURE — 36415 COLL VENOUS BLD VENIPUNCTURE: CPT

## 2024-09-14 PROCEDURE — 80061 LIPID PANEL: CPT

## 2024-11-12 RX ORDER — ALFUZOSIN HYDROCHLORIDE 10 MG/1
10 TABLET, EXTENDED RELEASE ORAL DAILY
Qty: 90 TABLET | Refills: 2 | Status: SHIPPED | OUTPATIENT
Start: 2024-11-12

## 2024-11-12 NOTE — TELEPHONE ENCOUNTER
Last seen on 8/22/24  Return in about 1 year (around 8/22/2025) for Free/Total PSA, Uroflow, PVR by U/S.

## 2025-01-06 ENCOUNTER — LAB (OUTPATIENT)
Dept: PRIMARY CARE CLINIC | Age: 70
End: 2025-01-06

## 2025-01-06 ENCOUNTER — OFFICE VISIT (OUTPATIENT)
Dept: FAMILY MEDICINE CLINIC | Age: 70
End: 2025-01-06
Payer: MEDICARE

## 2025-01-06 VITALS
DIASTOLIC BLOOD PRESSURE: 68 MMHG | OXYGEN SATURATION: 96 % | TEMPERATURE: 97 F | HEIGHT: 73 IN | SYSTOLIC BLOOD PRESSURE: 128 MMHG | WEIGHT: 267.4 LBS | BODY MASS INDEX: 35.44 KG/M2 | HEART RATE: 101 BPM

## 2025-01-06 DIAGNOSIS — M25.511 ACUTE PAIN OF RIGHT SHOULDER: Primary | ICD-10-CM

## 2025-01-06 PROCEDURE — G8417 CALC BMI ABV UP PARAM F/U: HCPCS | Performed by: NURSE PRACTITIONER

## 2025-01-06 PROCEDURE — 3017F COLORECTAL CA SCREEN DOC REV: CPT | Performed by: NURSE PRACTITIONER

## 2025-01-06 PROCEDURE — 1036F TOBACCO NON-USER: CPT | Performed by: NURSE PRACTITIONER

## 2025-01-06 PROCEDURE — 99213 OFFICE O/P EST LOW 20 MIN: CPT | Performed by: NURSE PRACTITIONER

## 2025-01-06 PROCEDURE — M1308 PR FLU IMMUNIZE NO ADMIN: HCPCS | Performed by: NURSE PRACTITIONER

## 2025-01-06 PROCEDURE — G8427 DOCREV CUR MEDS BY ELIG CLIN: HCPCS | Performed by: NURSE PRACTITIONER

## 2025-01-06 PROCEDURE — 1159F MED LIST DOCD IN RCRD: CPT | Performed by: NURSE PRACTITIONER

## 2025-01-06 PROCEDURE — 1123F ACP DISCUSS/DSCN MKR DOCD: CPT | Performed by: NURSE PRACTITIONER

## 2025-01-06 ASSESSMENT — PATIENT HEALTH QUESTIONNAIRE - PHQ9
SUM OF ALL RESPONSES TO PHQ QUESTIONS 1-9: 0
SUM OF ALL RESPONSES TO PHQ QUESTIONS 1-9: 0
1. LITTLE INTEREST OR PLEASURE IN DOING THINGS: NOT AT ALL
SUM OF ALL RESPONSES TO PHQ9 QUESTIONS 1 & 2: 0
SUM OF ALL RESPONSES TO PHQ QUESTIONS 1-9: 0
2. FEELING DOWN, DEPRESSED OR HOPELESS: NOT AT ALL
SUM OF ALL RESPONSES TO PHQ QUESTIONS 1-9: 0

## 2025-01-06 ASSESSMENT — ENCOUNTER SYMPTOMS
VOMITING: 0
ABDOMINAL PAIN: 0
DIARRHEA: 0
NAUSEA: 0
BACK PAIN: 0
SORE THROAT: 0
COUGH: 0
EYE PAIN: 0
SHORTNESS OF BREATH: 0
SINUS PAIN: 0

## 2025-01-06 NOTE — PROGRESS NOTES
[facility]  [facility]  [address] [address]  Dept: [phone]  Dept Fax: [phone]    Milan Cox is a 69 y.o. male who presents today for his medicalconditions/complaints as noted below.  Milan Cox is c/o of Shoulder Pain (Right shoulder pain x's 1 week. No injury)      HPI:       69-year-old male patient presents with concern for right shoulder pain.  Patient denies any acute injury or trauma.  Patient reports show the pain began 1 week ago.  Reports okay at rest does worsen with certain movements and positions.  Denies any prior surgeries on the shoulder.  Patient has tried over-the-counter medications.  Denies any neck pain.  Denies any numbness radiating down the right arm.        Past Medical History:   Diagnosis Date    JOSE EDUARDO (acute kidney injury) ([facility]) 05/05/2022    Arthritis     Back pain     BPH with obstruction/lower urinary tract symptoms     Elevated PSA     Hemorrhoids     High cholesterol     History of tinnitus     right ear    Prediabetes     no RX    Stomach ulcer     Under care of team     PCP: Justin CHUNG, [place], last visit 2022        Current Outpatient Medications   Medication Sig Dispense Refill    diclofenac sodium (VOLTAREN) 1 % GEL Apply 2 g topically 4 times daily 100 g 1    alfuzosin (UROXATRAL) 10 MG extended release tablet Take 1 tablet by mouth daily 90 tablet 2    rosuvastatin (CRESTOR) 10 MG tablet Take 1 tablet by mouth daily 90 tablet 1     No current facility-administered medications for this visit.     No Known Allergies    Subjective:      Review of Systems   Constitutional:  Negative for chills and fatigue.   HENT:  Negative for congestion, ear pain, sinus pain and sore throat.    Eyes:  Negative for pain and visual disturbance.   Respiratory:  Negative for cough and shortness of breath.    Cardiovascular:  Negative for chest pain and palpitations.   Gastrointestinal:  Negative for abdominal

## 2025-01-06 NOTE — PROGRESS NOTES
Visit Information    Have you changed or started any medications since your last visit including any over-the-counter medicines, vitamins, or herbal medicines? no   Have you stopped taking any of your medications? Is so, why? -  no  Are you having any side effects from any of your medications? - no    Have you seen any other physician or provider since your last visit?  no   Have you had any other diagnostic tests since your last visit?  no   Have you been seen in the emergency room and/or had an admission in a hospital since we last saw you?  no   Have you had your routine dental cleaning in the past 6 months?  no     Do you have an active MyChart account? If no, what is the barrier?  Yes    Patient Care Team:  Justin Mendoza APRN - CNP as PCP - General (Certified Nurse Practitioner)  Justin Mendoza APRN - CNP as PCP - Empaneled Provider  Suleiman Wynne MD as Consulting Physician (Urology)    Medical History Review  Past Medical, Family, and Social History reviewed and  contribute to the patient presenting condition    Health Maintenance   Topic Date Due    Shingles vaccine (1 of 2) Never done    Pneumococcal 65+ years Vaccine (1 of 1 - PCV) Never done    Depression Screen  02/20/2025    Annual Wellness Visit (Medicare)  02/20/2025    Colorectal Cancer Screen  03/10/2025    A1C test (Diabetic or Prediabetic)  07/19/2025    Prostate Specific Antigen (PSA) Screening or Monitoring  08/19/2025    Lipids  09/14/2025    Respiratory Syncytial Virus (RSV) Pregnant or age 60 yrs+ (1 - 1-dose 75+ series) 12/14/2029    DTaP/Tdap/Td vaccine (3 - Td or Tdap) 06/28/2033    Flu vaccine  Completed    COVID-19 Vaccine  Completed    AAA screen  Completed    Hepatitis C screen  Completed    Hepatitis A vaccine  Aged Out    Hepatitis B vaccine  Aged Out    Hib vaccine  Aged Out    Polio vaccine  Aged Out    Meningococcal (ACWY) vaccine  Aged Out    Diabetes screen  Discontinued    HIV screen  Discontinued

## 2025-02-21 DIAGNOSIS — E78.5 HYPERLIPIDEMIA, UNSPECIFIED HYPERLIPIDEMIA TYPE: ICD-10-CM

## 2025-02-21 RX ORDER — ROSUVASTATIN CALCIUM 10 MG/1
10 TABLET, COATED ORAL DAILY
Qty: 90 TABLET | Refills: 1 | Status: SHIPPED | OUTPATIENT
Start: 2025-02-21

## 2025-03-25 ENCOUNTER — OFFICE VISIT (OUTPATIENT)
Dept: FAMILY MEDICINE CLINIC | Age: 70
End: 2025-03-25
Payer: MEDICARE

## 2025-03-25 VITALS
HEIGHT: 73 IN | HEART RATE: 79 BPM | DIASTOLIC BLOOD PRESSURE: 68 MMHG | OXYGEN SATURATION: 95 % | BODY MASS INDEX: 36.84 KG/M2 | TEMPERATURE: 97.1 F | SYSTOLIC BLOOD PRESSURE: 122 MMHG | WEIGHT: 278 LBS

## 2025-03-25 DIAGNOSIS — E78.5 HYPERLIPIDEMIA, UNSPECIFIED HYPERLIPIDEMIA TYPE: ICD-10-CM

## 2025-03-25 DIAGNOSIS — Z00.00 MEDICARE ANNUAL WELLNESS VISIT, SUBSEQUENT: Primary | ICD-10-CM

## 2025-03-25 DIAGNOSIS — Z13.29 THYROID DISORDER SCREENING: ICD-10-CM

## 2025-03-25 DIAGNOSIS — R73.03 PREDIABETES: ICD-10-CM

## 2025-03-25 PROCEDURE — 3017F COLORECTAL CA SCREEN DOC REV: CPT | Performed by: NURSE PRACTITIONER

## 2025-03-25 PROCEDURE — G0439 PPPS, SUBSEQ VISIT: HCPCS | Performed by: NURSE PRACTITIONER

## 2025-03-25 PROCEDURE — 1123F ACP DISCUSS/DSCN MKR DOCD: CPT | Performed by: NURSE PRACTITIONER

## 2025-03-25 PROCEDURE — 1159F MED LIST DOCD IN RCRD: CPT | Performed by: NURSE PRACTITIONER

## 2025-03-25 SDOH — ECONOMIC STABILITY: FOOD INSECURITY: WITHIN THE PAST 12 MONTHS, YOU WORRIED THAT YOUR FOOD WOULD RUN OUT BEFORE YOU GOT MONEY TO BUY MORE.: NEVER TRUE

## 2025-03-25 SDOH — ECONOMIC STABILITY: FOOD INSECURITY: WITHIN THE PAST 12 MONTHS, THE FOOD YOU BOUGHT JUST DIDN'T LAST AND YOU DIDN'T HAVE MONEY TO GET MORE.: NEVER TRUE

## 2025-03-25 ASSESSMENT — PATIENT HEALTH QUESTIONNAIRE - PHQ9
SUM OF ALL RESPONSES TO PHQ QUESTIONS 1-9: 0
1. LITTLE INTEREST OR PLEASURE IN DOING THINGS: NOT AT ALL
SUM OF ALL RESPONSES TO PHQ QUESTIONS 1-9: 0
SUM OF ALL RESPONSES TO PHQ QUESTIONS 1-9: 0
2. FEELING DOWN, DEPRESSED OR HOPELESS: NOT AT ALL
SUM OF ALL RESPONSES TO PHQ QUESTIONS 1-9: 0

## 2025-03-25 NOTE — PATIENT INSTRUCTIONS

## 2025-03-25 NOTE — PROGRESS NOTES
Medicare Annual Wellness Visit    Milan Cox is here for Medicare AWV    Assessment & Plan   Medicare annual wellness visit, subsequent     No follow-ups on file.     Subjective         70 yo male presents for follow up     BPH, elevated PSA, follows with urology Dr. Wynne, recent biopsy stable, repeat psa in 1 year, in August 2025 is due currently treated with alfuzosin 10 mg     Hyperlipidemia, managed with crestor 10, last lipids stable     Predm last A1c 5.7    Right shoulder pain- mild arthritis -not interested in physical therapy or orthopedic consult at this time        Patient's complete Health Risk Assessment and screening values have been reviewed and are found in Flowsheets. The following problems were reviewed today and where indicated follow up appointments were made and/or referrals ordered.    Positive Risk Factor Screenings with Interventions:               Poor Eating Habits/Diet:  Do you eat balanced/healthy meals regularly?: (!) No  Interventions:  Work on low sodium and less fried foods in diet    Abnormal BMI (obese):  Body mass index is 36.68 kg/m². (!) Abnormal  Interventions:  Working on processed food reduction        Dentist Screen:  Have you seen the dentist within the past year?: (!) No    Intervention:  Advised to schedule with their dentist     Vision Screen:  Do you have difficulty driving, watching TV, or doing any of your daily activities because of your eyesight?: No  Have you had an eye exam within the past year?: (!) No  Interventions:   Patient encouraged to make appointment with their eye specialist    Safety:  Do you have any tripping hazards - loose or unsecured carpets or rugs?: (!) Yes  Do you have non-slip mats or non-slip surfaces or shower bars or grab bars in your shower or bathtub?: (!) No  Interventions:  See AVS for additional education material     Advanced Directives:  Do you have a Living Will?: (!) No    Intervention:  has NO advanced directive - not

## 2025-05-14 RX ORDER — ALFUZOSIN HYDROCHLORIDE 10 MG/1
10 TABLET, EXTENDED RELEASE ORAL DAILY
Qty: 90 TABLET | Refills: 0 | Status: SHIPPED | OUTPATIENT
Start: 2025-05-14

## 2025-07-17 DIAGNOSIS — N42.32 ATYPICAL SMALL ACINAR PROLIFERATION OF PROSTATE: Primary | ICD-10-CM

## 2025-07-17 DIAGNOSIS — R97.20 ELEVATED PSA: ICD-10-CM

## 2025-08-23 DIAGNOSIS — E78.5 HYPERLIPIDEMIA, UNSPECIFIED HYPERLIPIDEMIA TYPE: ICD-10-CM

## 2025-08-25 RX ORDER — ROSUVASTATIN CALCIUM 10 MG/1
10 TABLET, COATED ORAL DAILY
Qty: 90 TABLET | Refills: 1 | Status: SHIPPED | OUTPATIENT
Start: 2025-08-25

## 2025-09-02 RX ORDER — ALFUZOSIN HYDROCHLORIDE 10 MG/1
10 TABLET, EXTENDED RELEASE ORAL DAILY
Qty: 90 TABLET | Refills: 0 | Status: SHIPPED | OUTPATIENT
Start: 2025-09-02

## (undated) DEVICE — SUTURE VCRL SZ 0 L18IN ABSRB VLT L36MM CT-1 1/2 CIR J740D

## (undated) DEVICE — PACK PROCEDURE SURG GEN MIN SVMMC

## (undated) DEVICE — CATHETER URETH 20FR BLLN 30CC 3 W F SPEC INF CTRL BARDX

## (undated) DEVICE — GLOVE SURG SZ 65 THK91MIL LTX FREE SYN POLYISOPRENE

## (undated) DEVICE — SYRINGE 20ML LL S/C 50

## (undated) DEVICE — SUTURE VCRL SZ 3-0 L27IN ABSRB UD L26MM SH 1/2 CIR J416H

## (undated) DEVICE — KENDALL SCD EXPRESS SLEEVES, KNEE LENGTH, MEDIUM: Brand: KENDALL SCD

## (undated) DEVICE — PACK PROCEDURE SURG CYSTO SVMMC LF

## (undated) DEVICE — BAG,LEG,COMFORT-STRAPS,MEDIUM,20OZ: Brand: MEDLINE

## (undated) DEVICE — SPONGE DRN W4XL4IN RAYON/POLYESTER 6 PLY NONWOVEN PRECUT

## (undated) DEVICE — ARM DRAPE

## (undated) DEVICE — APPLIER SUT CLP FOR 2-0 3-0 4-0 VCRL ABSRB SUT

## (undated) DEVICE — TUBING INSUFFLATION CAP W/ EXT CARBON DIOX ENDO SMARTCAP

## (undated) DEVICE — CONTAINER,SPECIMEN,4OZ,OR STRL: Brand: MEDLINE

## (undated) DEVICE — APPLICATOR LAP 35 CM 2 RIGID VISTASEAL

## (undated) DEVICE — Device: Brand: DEFENDO VALVE AND CONNECTOR KIT

## (undated) DEVICE — DRESSING TRNSPAR W5XL4.5IN FLM SHT SEMIPERMEABLE WIND

## (undated) DEVICE — 3M™ IOBAN™ 2 ANTIMICROBIAL INCISE DRAPE 6650EZ: Brand: IOBAN™ 2

## (undated) DEVICE — SOLUTION IRRIG 1000ML 0.9% SOD CHL USP POUR PLAS BTL

## (undated) DEVICE — PROTECTOR ULN NRV PUR FOAM HK LOOP STRP ANATOMICALLY

## (undated) DEVICE — TROCAR: Brand: KII FIOS FIRST ENTRY

## (undated) DEVICE — 3M™ TEGADERM™ TRANSPARENT FILM DRESSING FRAME STYLE, 1626W, 4 IN X 4-3/4 IN (10 CM X 12 CM), 50/CT 4CT/CASE: Brand: 3M™ TEGADERM™

## (undated) DEVICE — ELECTRODE PT RET AD L9FT HI MOIST COND ADH HYDRGEL CORDED

## (undated) DEVICE — SUTURE VCRL + SZ 3-0 L27IN ABSRB UD L26MM SH 1/2 CIR VCP416H

## (undated) DEVICE — SUTURE V-LOC 180 SZ 3-0 L6IN ABSRB GRN V-20 L26MM 1/2 CIR VLOCL0604

## (undated) DEVICE — SUTURE VCRL SZ 4-0 L18IN ABSRB UD L13MM P-3 3/8 CIR PRIM J494H

## (undated) DEVICE — PERRYSBURG ENDO PACK: Brand: MEDLINE INDUSTRIES, INC.

## (undated) DEVICE — BASIN EMSIS 700ML GRAPHITE PLAS KID SHP GRAD

## (undated) DEVICE — SUTURE VCRL SZ 4-0 L27IN ABSRB UD L26MM SH 1/2 CIR J415H

## (undated) DEVICE — 4-PORT MANIFOLD: Brand: NEPTUNE 2

## (undated) DEVICE — ADAPTER,CATHETER/SYRINGE/LUER,STERILE: Brand: MEDLINE

## (undated) DEVICE — COUNTER NDL 10 COUNT HLD 20 FOAM BLK SGL MAG

## (undated) DEVICE — STRAP,CATHETER,ELASTIC,HOOK&LOOP: Brand: MEDLINE

## (undated) DEVICE — PAD,NON-ADHERENT,3X8,STERILE,LF,1/PK: Brand: MEDLINE

## (undated) DEVICE — DRAPE,UNDRBUT,WHT GRAD PCH,CAPPORT,20/CS: Brand: MEDLINE

## (undated) DEVICE — GLOVE ORANGE PI 8   MSG9080

## (undated) DEVICE — TOWEL,OR,DSP,ST,NATURAL,DLX,4/PK,20PK/CS: Brand: MEDLINE

## (undated) DEVICE — SOLUTION IRRIG 3000ML STRL H2O USP UROMATIC PLAS CONT

## (undated) DEVICE — METER,URINE,400ML,DRAIN BAG,L/F,LL: Brand: MEDLINE

## (undated) DEVICE — SUTURE MCRYL + SZ 4-0 L18IN ABSRB UD L19MM PS-2 3/8 CIR MCP496G

## (undated) DEVICE — ELECTRO LUBE IS A SINGLE PATIENT USE DEVICE THAT IS INTENDED TO BE USED ON ELECTROSURGICAL ELECTRODES TO REDUCE STICKING.: Brand: KEY SURGICAL ELECTRO LUBE

## (undated) DEVICE — Device

## (undated) DEVICE — CATHETER URETH 20FR BLLN 30CC 2 W F INF CTRL BARDX

## (undated) DEVICE — SNARE ENDOSCP L240CM SHTH DIA2.4MM LOOP W20MM MIN WRK CHN

## (undated) DEVICE — AGENT HEMSTAT 3GM OXIDIZED REGENERATED CELOS ABSRB FOR CONT (ORDER MULTIPLES OF 5EA)

## (undated) DEVICE — SUTURE NONABSORBABLE MONOFILAMENT 3-0 PS-1 18 IN BLK ETHILON 1663H

## (undated) DEVICE — CANNULA SEAL

## (undated) DEVICE — GLOVE ORANGE PI 8 1/2   MSG9085

## (undated) DEVICE — TRI-LUMEN FILTERED TUBE SET WITH ACTIVATED CHARCOAL FILTER: Brand: AIRSEAL

## (undated) DEVICE — INSUFFLATION NEEDLE TO ESTABLISH PNEUMOPERITONEUM.: Brand: INSUFFLATION NEEDLE

## (undated) DEVICE — SUTURE V-LOC 180 SZ 0 L9IN ABSRB GRN GS-21 L37MM 1/2 CIR VLOCL0346

## (undated) DEVICE — POLYP TRAP: Brand: TRAPEASE®

## (undated) DEVICE — SOLUTION SURG PREP ANTIMICROBIAL 4 OZ SKIN WND EXIDINE

## (undated) DEVICE — EVACUATOR URO BLDR W/ ADPT UROVAC

## (undated) DEVICE — SOLUTION IV IRRIG 1000ML POUR BTL 2F7114

## (undated) DEVICE — GOWN,AURORA,NONRNF,XL,30/CS: Brand: MEDLINE

## (undated) DEVICE — BINDER ABD UNIV H9IN WAIST 45-62IN E SFT COT PREM 3 PNL

## (undated) DEVICE — STRAP,POSITIONING,KNEE/BODY,FOAM,4X60": Brand: MEDLINE

## (undated) DEVICE — SUTURE ABSORBABLE MONOFILAMENT 3-0 CV-23 12 IN GRN V-LOC 180 VLOCL0814

## (undated) DEVICE — FORCEPS BX L240CM JAW DIA2.4MM ORNG L CAP W/ NDL DISP RAD

## (undated) DEVICE — Z DISCONTINUED USE 2275686 GLOVE SURG SZ 8 L12IN FNGR THK13MIL WHT ISOLEX POLYISOPRENE

## (undated) DEVICE — INTENDED FOR TISSUE SEPARATION, AND OTHER PROCEDURES THAT REQUIRE A SHARP SURGICAL BLADE TO PUNCTURE OR CUT.: Brand: BARD-PARKER ® CARBON RIB-BACK BLADES

## (undated) DEVICE — STRAP ARMBRD W1.5XL32IN FOAM STR YET SFT W/ HK AND LOOP

## (undated) DEVICE — GLOVE ORANGE PI 7   MSG9070

## (undated) DEVICE — TUBING DRAIN ST PENROSE

## (undated) DEVICE — Z DISCONTINUED BY MEDLINE USE 2711682 TRAY SKIN PREP DRY W/ PREM GLV

## (undated) DEVICE — CHLORAPREP 26ML ORANGE

## (undated) DEVICE — CORD LAPAROSCOPIC MONOPOLAR

## (undated) DEVICE — SYRINGE,PISTON,IRRIGATION,60ML,STERILE: Brand: MEDLINE

## (undated) DEVICE — SUTURE PDS II SZ 0 L27IN ABSRB VLT L36MM CT-1 1/2 CIR Z340H

## (undated) DEVICE — CONNECTOR TBNG AUX H2O JET DISP FOR OLY 160/180 SER

## (undated) DEVICE — SUTURE PERMAHAND SZ 3-0 L30IN NONABSORBABLE BLK SILK BRAID A304H

## (undated) DEVICE — GLOVE SURG SZ 8 CRM LTX FREE POLYISOPRENE POLYMER BEAD ANTI

## (undated) DEVICE — TIP COVER ACCESSORY

## (undated) DEVICE — SURGICEL ENDOSCP APPL

## (undated) DEVICE — GLOVE ORANGE PI 7 1/2   MSG9075

## (undated) DEVICE — Y-TYPE TUR/BLADDER IRRIGATION SET, REGULATING CLAMP

## (undated) DEVICE — AIRSEAL 12 MM ACCESS PORT AND PALM GRIP OBTURATOR WITH BLADELESS OPTICAL TIP, 120 MM LENGTH: Brand: AIRSEAL

## (undated) DEVICE — SOLUTION ANTIFOG VIS SYS CLEARIFY LAPSCP

## (undated) DEVICE — SPONGE GZ W2XL2IN NONWOVEN 4 PLY FASTER WICKING ABIL AVANT

## (undated) DEVICE — SUTURE V-LOC 180 SZ 3-0 L6IN ABSRB GRN L17MM CV-23 1/2 CIR VLOCL0804

## (undated) DEVICE — BLADELESS OBTURATOR: Brand: WECK VISTA

## (undated) DEVICE — FORCEPS BX L240CM WRK CHN 2.8MM STD CAP W/ NDL MIC MESH

## (undated) DEVICE — Z DISCONTINUED USE 2751540 TUBING IRRIG L10IN DISP PMP ENDOGATOR

## (undated) DEVICE — 40580 - THE PINK PAD - ADVANCED TRENDELENBURG POSITIONING KIT: Brand: 40580 - THE PINK PAD - ADVANCED TRENDELENBURG POSITIONING KIT

## (undated) DEVICE — DRAPE,REIN 53X77,STERILE: Brand: MEDLINE

## (undated) DEVICE — SEALANT FIBRIN 4 CC VISTASEAL

## (undated) DEVICE — STERILE COTTON BALLS LARGE 5/P: Brand: MEDLINE